# Patient Record
Sex: FEMALE | Race: WHITE | Employment: FULL TIME | URBAN - METROPOLITAN AREA
[De-identification: names, ages, dates, MRNs, and addresses within clinical notes are randomized per-mention and may not be internally consistent; named-entity substitution may affect disease eponyms.]

---

## 2017-02-03 ENCOUNTER — HOSPITAL ENCOUNTER (OUTPATIENT)
Dept: INTERVENTIONAL RADIOLOGY/VASCULAR | Facility: HOSPITAL | Age: 37
Discharge: HOME HEALTH | End: 2017-02-03
Attending: RADIOLOGY | Admitting: RADIOLOGY
Payer: COMMERCIAL

## 2017-02-03 VITALS
RESPIRATION RATE: 18 BRPM | WEIGHT: 293 LBS | SYSTOLIC BLOOD PRESSURE: 121 MMHG | HEART RATE: 76 BPM | DIASTOLIC BLOOD PRESSURE: 68 MMHG | BODY MASS INDEX: 45.99 KG/M2 | HEIGHT: 67 IN | OXYGEN SATURATION: 100 %

## 2017-02-03 DIAGNOSIS — M79.604 PAIN OF RIGHT LOWER EXTREMITY: Primary | ICD-10-CM

## 2017-02-03 DIAGNOSIS — I83.813 VARICOSE VEINS WITH PAIN, BILATERAL: ICD-10-CM

## 2017-02-03 LAB — B-HCG UR QL: NEGATIVE

## 2017-02-03 PROCEDURE — 36478 ENDOVENOUS LASER 1ST VEIN: CPT

## 2017-02-03 PROCEDURE — 065P3ZZ DESTRUCTION OF RIGHT SAPHENOUS VEIN, PERCUTANEOUS APPROACH: ICD-10-PCS | Performed by: RADIOLOGY

## 2017-02-03 PROCEDURE — 37766 PHLEB VEINS - EXTREM 20+: CPT

## 2017-02-03 PROCEDURE — 06DP3ZZ EXTRACTION OF RIGHT SAPHENOUS VEIN, PERCUTANEOUS APPROACH: ICD-10-PCS | Performed by: RADIOLOGY

## 2017-02-03 PROCEDURE — 81025 URINE PREGNANCY TEST: CPT | Performed by: RADIOLOGY

## 2017-02-03 RX ORDER — SODIUM CHLORIDE 9 MG/ML
INJECTION, SOLUTION INTRAVENOUS
Status: COMPLETED
Start: 2017-02-03 | End: 2017-02-03

## 2017-02-03 RX ORDER — SODIUM CHLORIDE 9 MG/ML
INJECTION, SOLUTION INTRAVENOUS
Status: DISCONTINUED
Start: 2017-02-03 | End: 2017-02-03

## 2017-02-03 RX ORDER — MIDAZOLAM HYDROCHLORIDE 1 MG/ML
INJECTION INTRAMUSCULAR; INTRAVENOUS
Status: COMPLETED
Start: 2017-02-03 | End: 2017-02-03

## 2017-02-03 RX ORDER — SODIUM CHLORIDE 9 MG/ML
INJECTION, SOLUTION INTRAVENOUS CONTINUOUS
Status: DISCONTINUED | OUTPATIENT
Start: 2017-02-03 | End: 2017-02-03

## 2017-02-03 RX ORDER — MIDAZOLAM HYDROCHLORIDE 1 MG/ML
2 INJECTION INTRAMUSCULAR; INTRAVENOUS EVERY 5 MIN PRN
Status: COMPLETED | OUTPATIENT
Start: 2017-02-03 | End: 2017-02-03

## 2017-02-03 RX ORDER — LIDOCAINE HYDROCHLORIDE 20 MG/ML
INJECTION, SOLUTION EPIDURAL; INFILTRATION; INTRACAUDAL; PERINEURAL
Status: COMPLETED
Start: 2017-02-03 | End: 2017-02-03

## 2017-02-03 RX ADMIN — MIDAZOLAM HYDROCHLORIDE 0.5 MG: 1 INJECTION INTRAMUSCULAR; INTRAVENOUS at 13:40:00

## 2017-02-03 RX ADMIN — MIDAZOLAM HYDROCHLORIDE 1 MG: 1 INJECTION INTRAMUSCULAR; INTRAVENOUS at 13:00:00

## 2017-02-03 RX ADMIN — MIDAZOLAM HYDROCHLORIDE 1 MG: 1 INJECTION INTRAMUSCULAR; INTRAVENOUS at 13:08:00

## 2017-02-03 RX ADMIN — MIDAZOLAM HYDROCHLORIDE 1 MG: 1 INJECTION INTRAMUSCULAR; INTRAVENOUS at 13:59:00

## 2017-02-03 RX ADMIN — MIDAZOLAM HYDROCHLORIDE 0.5 MG: 1 INJECTION INTRAMUSCULAR; INTRAVENOUS at 13:24:00

## 2017-02-03 RX ADMIN — MIDAZOLAM HYDROCHLORIDE 1 MG: 1 INJECTION INTRAMUSCULAR; INTRAVENOUS at 13:11:00

## 2017-02-03 NOTE — PROCEDURES
[facility] HOSP - [facility]  Procedure Note    Bryant Irving Patient Status:  Outpatient in a Bed    1980 MRN [id number]   Location [facility] Attending Ishmael Gonzales MD   Hosp Day # 0 PCP Bill Carson MD

## 2017-02-03 NOTE — H&P
221 Rubén Reid Patient Status:  Outpatient in a Bed    1980 MRN [id number]   Location [facility] Attending Shaheen Martinez MD   Hosp Day # 0 PCP Bryan Flores,

## 2017-02-07 ENCOUNTER — TELEPHONE (OUTPATIENT)
Dept: INTERNAL MEDICINE CLINIC | Facility: CLINIC | Age: 37
End: 2017-02-07

## 2017-02-07 ENCOUNTER — OFFICE VISIT (OUTPATIENT)
Dept: INTERVENTIONAL RADIOLOGY/VASCULAR | Facility: HOSPITAL | Age: 37
End: 2017-02-07
Attending: RADIOLOGY
Payer: COMMERCIAL

## 2017-02-07 ENCOUNTER — LAB ENCOUNTER (OUTPATIENT)
Dept: LAB | Facility: HOSPITAL | Age: 37
End: 2017-02-07
Attending: RADIOLOGY
Payer: COMMERCIAL

## 2017-02-07 VITALS — RESPIRATION RATE: 18 BRPM | SYSTOLIC BLOOD PRESSURE: 120 MMHG | HEART RATE: 80 BPM | DIASTOLIC BLOOD PRESSURE: 80 MMHG

## 2017-02-07 DIAGNOSIS — I83.90 VARICOSE VEIN OF LEG: Primary | ICD-10-CM

## 2017-02-07 DIAGNOSIS — I82.409 DVT (DEEP VENOUS THROMBOSIS) (HCC): ICD-10-CM

## 2017-02-07 DIAGNOSIS — I82.409 DVT (DEEP VENOUS THROMBOSIS) (HCC): Primary | ICD-10-CM

## 2017-02-07 LAB — AT III ACT/NOR PPP CHRO: 94 % NHP (ref 90–122)

## 2017-02-07 PROCEDURE — 81241 F5 GENE: CPT

## 2017-02-07 PROCEDURE — 85390 FIBRINOLYSINS SCREEN I&R: CPT

## 2017-02-07 PROCEDURE — 85210 CLOT FACTOR II PROTHROM SPEC: CPT

## 2017-02-07 PROCEDURE — 85305 CLOT INHIBIT PROT S TOTAL: CPT

## 2017-02-07 PROCEDURE — 81240 F2 GENE: CPT

## 2017-02-07 PROCEDURE — 85302 CLOT INHIBIT PROT C ANTIGEN: CPT

## 2017-02-07 PROCEDURE — 36415 COLL VENOUS BLD VENIPUNCTURE: CPT

## 2017-02-07 PROCEDURE — 85613 RUSSELL VIPER VENOM DILUTED: CPT

## 2017-02-07 PROCEDURE — 85300 ANTITHROMBIN III ACTIVITY: CPT

## 2017-02-07 PROCEDURE — 85730 THROMBOPLASTIN TIME PARTIAL: CPT

## 2017-02-07 PROCEDURE — 85610 PROTHROMBIN TIME: CPT

## 2017-02-07 PROCEDURE — 85335 FACTOR INHIBITOR TEST: CPT

## 2017-02-07 RX ORDER — HYDROCODONE BITARTRATE AND ACETAMINOPHEN 5; 325 MG/1; MG/1
1 TABLET ORAL EVERY 6 HOURS PRN
Status: DISCONTINUED | OUTPATIENT
Start: 2017-02-07 | End: 2017-02-21

## 2017-02-07 RX ORDER — CEPHALEXIN 250 MG/1
250 CAPSULE ORAL EVERY 6 HOURS SCHEDULED
Status: DISCONTINUED | OUTPATIENT
Start: 2017-02-07 | End: 2017-02-21

## 2017-02-07 RX ORDER — HYDROCODONE BITARTRATE AND ACETAMINOPHEN 5; 325 MG/1; MG/1
1 TABLET ORAL EVERY 6 HOURS PRN
Qty: 20 TABLET | Refills: 0 | Status: ON HOLD | OUTPATIENT
Start: 2017-02-07 | End: 2017-02-21

## 2017-02-07 NOTE — TELEPHONE ENCOUNTER
Patient is seeing Dr Laurie See today for follow up from surgery from last Friday also has appt 2-10-17. Patient was told to call office is needing new referral requesting 3 office visits.

## 2017-02-07 NOTE — PROGRESS NOTES
Pt called complaining of increased pain in her leg stating that the pain was tolerable over the weekend, but on Monday her stitches were painful when donning and removing her compression stockings. Has been taking Aleive RTC,. Denies increased swelling.

## 2017-02-07 NOTE — PROGRESS NOTES
[facility]  Progress Note      Micah Peralta Patient Status:  Outpatient    1980 MRN [id number]   [place] [address] Attending Margaux Stack MD   [facility] Day #  PCP Aleksandar Cuevas MD       Subjective:

## 2017-02-08 LAB
APTT PPP: 26.9 SECONDS (ref 23.2–35.3)
CONFIRM DRVVT: 1.1 S (ref 0–1.1)
F2 C.20210G>A GENO BLD/T: NORMAL
PROTHROMBIN TIME: 14 SECONDS (ref 11.8–14.5)

## 2017-02-09 LAB
PROTEIN C, TOTAL ANTIGEN: 81 %
PROTEIN S, TOTAL ANTIGEN: 115 %

## 2017-02-10 ENCOUNTER — TELEPHONE (OUTPATIENT)
Dept: INTERNAL MEDICINE CLINIC | Facility: CLINIC | Age: 37
End: 2017-02-10

## 2017-02-10 ENCOUNTER — HOSPITAL ENCOUNTER (OUTPATIENT)
Dept: ULTRASOUND IMAGING | Facility: HOSPITAL | Age: 37
Discharge: HOME | End: 2017-02-10
Attending: RADIOLOGY
Payer: COMMERCIAL

## 2017-02-10 ENCOUNTER — HOSPITAL ENCOUNTER (INPATIENT)
Facility: HOSPITAL | Age: 37
LOS: 11 days | Discharge: HOME HEALTH | DRG: 580 | End: 2017-02-22
Attending: EMERGENCY MEDICINE | Admitting: HOSPITALIST
Payer: COMMERCIAL

## 2017-02-10 DIAGNOSIS — R73.9 HYPERGLYCEMIA: ICD-10-CM

## 2017-02-10 DIAGNOSIS — L03.115 CELLULITIS OF RIGHT LEG: Primary | ICD-10-CM

## 2017-02-10 DIAGNOSIS — E87.1 HYPONATREMIA: ICD-10-CM

## 2017-02-10 DIAGNOSIS — I83.811 VARICOSE VEINS WITH PAIN, RIGHT: ICD-10-CM

## 2017-02-10 PROCEDURE — 93971 EXTREMITY STUDY: CPT

## 2017-02-10 NOTE — TELEPHONE ENCOUNTER
Chris Dejesus called from [phone]: Wearing compression stockings after vein ablation Developed cellulitis. Sent home on PO clinda. Area marked. If fever or worse ER but F/U on Monday. Pt. To call office to schedule. PCP notified.

## 2017-02-10 NOTE — PROGRESS NOTES
[facility] HOSP - [facility]  Progress Note    Micah Peralta Patient Status:  Outpatient    1980 MRN [id number]   Location [place] [address] Attending Lesley Trinidad MD   Hosp Day # 0 PCP Aleksandar Cuevas MD       Subjective:   Nathan Cartagena

## 2017-02-11 PROBLEM — E87.1 HYPONATREMIA: Status: ACTIVE | Noted: 2017-02-11

## 2017-02-11 PROBLEM — R73.9 HYPERGLYCEMIA: Status: ACTIVE | Noted: 2017-02-11

## 2017-02-11 PROBLEM — L03.115 CELLULITIS OF RIGHT LEG: Status: ACTIVE | Noted: 2017-02-11

## 2017-02-11 LAB
ALBUMIN SERPL BCP-MCNC: 2.7 G/DL (ref 3.5–4.8)
ALP SERPL-CCNC: 60 U/L (ref 32–100)
ALT SERPL-CCNC: 28 U/L (ref 14–54)
ANION GAP SERPL CALC-SCNC: 10 MMOL/L (ref 0–18)
ANION GAP SERPL CALC-SCNC: 4 MMOL/L (ref 0–18)
AST SERPL-CCNC: 30 U/L (ref 15–41)
B-HCG UR QL: NEGATIVE
BASOPHILS # BLD: 0 K/UL (ref 0–0.2)
BASOPHILS # BLD: 0.1 K/UL (ref 0–0.2)
BASOPHILS NFR BLD: 1 %
BASOPHILS NFR BLD: 1 %
BILIRUB DIRECT SERPL-MCNC: 0.2 MG/DL (ref 0–0.2)
BILIRUB SERPL-MCNC: 0.5 MG/DL (ref 0.3–1.2)
BILIRUB UR QL: NEGATIVE
BUN SERPL-MCNC: 10 MG/DL (ref 8–20)
BUN SERPL-MCNC: 10 MG/DL (ref 8–20)
BUN/CREAT SERPL: 12.3 (ref 10–20)
BUN/CREAT SERPL: 13.9 (ref 10–20)
CALCIUM SERPL-MCNC: 7.8 MG/DL (ref 8.5–10.5)
CALCIUM SERPL-MCNC: 8.6 MG/DL (ref 8.5–10.5)
CHLORIDE SERPL-SCNC: 105 MMOL/L (ref 95–110)
CHLORIDE SERPL-SCNC: 99 MMOL/L (ref 95–110)
CLARITY UR: CLEAR
CO2 SERPL-SCNC: 23 MMOL/L (ref 22–32)
CO2 SERPL-SCNC: 25 MMOL/L (ref 22–32)
COLOR UR: YELLOW
CREAT SERPL-MCNC: 0.72 MG/DL (ref 0.5–1.5)
CREAT SERPL-MCNC: 0.81 MG/DL (ref 0.5–1.5)
EOSINOPHIL # BLD: 0.2 K/UL (ref 0–0.7)
EOSINOPHIL # BLD: 0.2 K/UL (ref 0–0.7)
EOSINOPHIL NFR BLD: 2 %
EOSINOPHIL NFR BLD: 2 %
ERYTHROCYTE [DISTWIDTH] IN BLOOD BY AUTOMATED COUNT: 12.3 % (ref 11–15)
ERYTHROCYTE [DISTWIDTH] IN BLOOD BY AUTOMATED COUNT: 12.3 % (ref 11–15)
GLUCOSE BLDC GLUCOMTR-MCNC: 198 MG/DL (ref 70–99)
GLUCOSE BLDC GLUCOMTR-MCNC: 226 MG/DL (ref 70–99)
GLUCOSE BLDC GLUCOMTR-MCNC: 235 MG/DL (ref 70–99)
GLUCOSE BLDC GLUCOMTR-MCNC: 266 MG/DL (ref 70–99)
GLUCOSE BLDC GLUCOMTR-MCNC: 277 MG/DL (ref 70–99)
GLUCOSE SERPL-MCNC: 261 MG/DL (ref 70–99)
GLUCOSE SERPL-MCNC: 269 MG/DL (ref 70–99)
GLUCOSE UR-MCNC: >=500 MG/DL
HCT VFR BLD AUTO: 32.3 % (ref 35–48)
HCT VFR BLD AUTO: 35.9 % (ref 35–48)
HGB BLD-MCNC: 10.8 G/DL (ref 12–16)
HGB BLD-MCNC: 12.3 G/DL (ref 12–16)
KETONES UR-MCNC: NEGATIVE MG/DL
LACTATE SERPL-SCNC: 1 MMOL/L (ref 0.5–2.2)
LACTATE SERPL-SCNC: 1 MMOL/L (ref 0.5–2.2)
LACTATE SERPL-SCNC: 1.5 MMOL/L (ref 0.5–2.2)
LEUKOCYTE ESTERASE UR QL STRIP.AUTO: NEGATIVE
LYMPHOCYTES # BLD: 1.9 K/UL (ref 1–4)
LYMPHOCYTES # BLD: 2.2 K/UL (ref 1–4)
LYMPHOCYTES NFR BLD: 21 %
LYMPHOCYTES NFR BLD: 24 %
MCH RBC QN AUTO: 29.9 PG (ref 27–32)
MCH RBC QN AUTO: 30.1 PG (ref 27–32)
MCHC RBC AUTO-ENTMCNC: 33.6 G/DL (ref 32–37)
MCHC RBC AUTO-ENTMCNC: 34.2 G/DL (ref 32–37)
MCV RBC AUTO: 87.8 FL (ref 80–100)
MCV RBC AUTO: 89.2 FL (ref 80–100)
MONOCYTES # BLD: 0.8 K/UL (ref 0–1)
MONOCYTES # BLD: 0.9 K/UL (ref 0–1)
MONOCYTES NFR BLD: 10 %
MONOCYTES NFR BLD: 9 %
NEUTROPHILS # BLD AUTO: 5.1 K/UL (ref 1.8–7.7)
NEUTROPHILS # BLD AUTO: 6.8 K/UL (ref 1.8–7.7)
NEUTROPHILS NFR BLD: 64 %
NEUTROPHILS NFR BLD: 67 %
NITRITE UR QL STRIP.AUTO: NEGATIVE
OSMOLALITY UR CALC.SUM OF ELEC: 283 MOSM/KG (ref 275–295)
OSMOLALITY UR CALC.SUM OF ELEC: 286 MOSM/KG (ref 275–295)
PH UR: 6 [PH] (ref 5–8)
PLATELET # BLD AUTO: 244 K/UL (ref 140–400)
PLATELET # BLD AUTO: 278 K/UL (ref 140–400)
PMV BLD AUTO: 7.9 FL (ref 7.4–10.3)
PMV BLD AUTO: 8.3 FL (ref 7.4–10.3)
POTASSIUM SERPL-SCNC: 3.6 MMOL/L (ref 3.3–5.1)
POTASSIUM SERPL-SCNC: 3.7 MMOL/L (ref 3.3–5.1)
POTASSIUM SERPL-SCNC: 4.2 MMOL/L (ref 3.3–5.1)
PROT SERPL-MCNC: 6.9 G/DL (ref 5.9–8.4)
PROT UR-MCNC: NEGATIVE MG/DL
RBC # BLD AUTO: 3.62 M/UL (ref 3.7–5.4)
RBC # BLD AUTO: 4.09 M/UL (ref 3.7–5.4)
RBC #/AREA URNS AUTO: 2 /HPF
SODIUM SERPL-SCNC: 132 MMOL/L (ref 136–144)
SODIUM SERPL-SCNC: 134 MMOL/L (ref 136–144)
SP GR UR STRIP: 1.01 (ref 1–1.03)
UROBILINOGEN UR STRIP-ACNC: <2
VIT C UR-MCNC: NEGATIVE MG/DL
WBC # BLD AUTO: 10.2 K/UL (ref 4–11)
WBC # BLD AUTO: 8.1 K/UL (ref 4–11)
WBC #/AREA URNS AUTO: 1 /HPF

## 2017-02-11 PROCEDURE — 99222 1ST HOSP IP/OBS MODERATE 55: CPT | Performed by: HOSPITALIST

## 2017-02-11 RX ORDER — HYDROCODONE BITARTRATE AND ACETAMINOPHEN 10; 325 MG/1; MG/1
1 TABLET ORAL EVERY 4 HOURS PRN
Status: DISCONTINUED | OUTPATIENT
Start: 2017-02-11 | End: 2017-02-15

## 2017-02-11 RX ORDER — MORPHINE SULFATE 4 MG/ML
4 INJECTION, SOLUTION INTRAMUSCULAR; INTRAVENOUS ONCE
Status: COMPLETED | OUTPATIENT
Start: 2017-02-11 | End: 2017-02-11

## 2017-02-11 RX ORDER — SENNA AND DOCUSATE SODIUM 50; 8.6 MG/1; MG/1
2 TABLET, FILM COATED ORAL DAILY
Status: DISCONTINUED | OUTPATIENT
Start: 2017-02-11 | End: 2017-02-22

## 2017-02-11 RX ORDER — KETOROLAC TROMETHAMINE 30 MG/ML
15 INJECTION, SOLUTION INTRAMUSCULAR; INTRAVENOUS ONCE
Status: COMPLETED | OUTPATIENT
Start: 2017-02-11 | End: 2017-02-11

## 2017-02-11 RX ORDER — DEXTROSE MONOHYDRATE 25 G/50ML
50 INJECTION, SOLUTION INTRAVENOUS AS NEEDED
Status: DISCONTINUED | OUTPATIENT
Start: 2017-02-11 | End: 2017-02-17

## 2017-02-11 RX ORDER — ATORVASTATIN CALCIUM 10 MG/1
10 TABLET, FILM COATED ORAL NIGHTLY
Status: DISCONTINUED | OUTPATIENT
Start: 2017-02-11 | End: 2017-02-22

## 2017-02-11 RX ORDER — POLYETHYLENE GLYCOL 3350 17 G/17G
17 POWDER, FOR SOLUTION ORAL DAILY PRN
Status: DISCONTINUED | OUTPATIENT
Start: 2017-02-11 | End: 2017-02-22

## 2017-02-11 RX ORDER — FLUOXETINE HYDROCHLORIDE 20 MG/1
40 CAPSULE ORAL DAILY
Status: DISCONTINUED | OUTPATIENT
Start: 2017-02-11 | End: 2017-02-22

## 2017-02-11 RX ORDER — HYDROMORPHONE HYDROCHLORIDE 1 MG/ML
1 INJECTION, SOLUTION INTRAMUSCULAR; INTRAVENOUS; SUBCUTANEOUS EVERY 2 HOUR PRN
Status: DISCONTINUED | OUTPATIENT
Start: 2017-02-11 | End: 2017-02-22

## 2017-02-11 RX ORDER — HEPARIN SODIUM 5000 [USP'U]/ML
5000 INJECTION, SOLUTION INTRAVENOUS; SUBCUTANEOUS EVERY 12 HOURS
Status: DISCONTINUED | OUTPATIENT
Start: 2017-02-11 | End: 2017-02-14

## 2017-02-11 RX ORDER — POTASSIUM CHLORIDE 20 MEQ/1
40 TABLET, EXTENDED RELEASE ORAL EVERY 4 HOURS
Status: COMPLETED | OUTPATIENT
Start: 2017-02-11 | End: 2017-02-11

## 2017-02-11 RX ORDER — ONDANSETRON 2 MG/ML
4 INJECTION INTRAMUSCULAR; INTRAVENOUS EVERY 6 HOURS PRN
Status: DISCONTINUED | OUTPATIENT
Start: 2017-02-11 | End: 2017-02-14

## 2017-02-11 RX ORDER — HYDROCODONE BITARTRATE AND ACETAMINOPHEN 5; 325 MG/1; MG/1
1 TABLET ORAL EVERY 6 HOURS PRN
Status: DISCONTINUED | OUTPATIENT
Start: 2017-02-11 | End: 2017-02-12

## 2017-02-11 RX ORDER — SODIUM CHLORIDE 9 MG/ML
INJECTION, SOLUTION INTRAVENOUS CONTINUOUS
Status: DISCONTINUED | OUTPATIENT
Start: 2017-02-11 | End: 2017-02-17

## 2017-02-11 RX ORDER — HYDROCODONE BITARTRATE AND ACETAMINOPHEN 5; 325 MG/1; MG/1
1 TABLET ORAL EVERY 6 HOURS PRN
Status: DISCONTINUED | OUTPATIENT
Start: 2017-02-11 | End: 2017-02-11

## 2017-02-11 NOTE — ED NOTES
Pt verbalized having vein striping surgery on February 3d, 2017. Pt had a follow up mary. On tuesday and was placed on cephalexin and norco bc she developed pain and fever on Monday.  Yesterday pt went for a main follow up, and was placed on clindamycin bc r

## 2017-02-11 NOTE — H&P
[facility]    PATIENT'S NAME: Chanel Bhatia PHYSICIAN: Daria Dang MD   PATIENT ACCOUNT#:   [id number]    LOCATION:  [address] [place] [address] #:   [id number]       YOB: 1980  ADMISSION DATE:       02/10/ admitted for further evaluation and treatment. .      Labs:  Glucose 269, sodium 132, potassium 3.7, chloride 99, CO2 of 23, BUN 10 with a creatinine of 0.81. Anion gap 10. Alkaline phosphatase 60.   The remainder of LFTs were essentially normal, except f saturation 98% on room air. HEENT:  Normocephalic, atraumatic. Pupils equal, reactive to light. Sclerae anicteric. There was no sinus tenderness. Posterior pharynx was not injected. Oropharynx was crowded. NECK:  Supple.   LUNGS:  Clear with decrease a fluctuant area on exam.  We will await their recommendations and continue IV antibiotics as above. 2.   Diabetes mellitus type 2. The patient has been noncompliant with medications and testing.   Discussed at length with her and family at the bedside th

## 2017-02-11 NOTE — PROGRESS NOTES
[facility] dosing service    Initial Pharmacokinetic Consult for Vancomycin Dosing     Jason Liner is a 39year old female who is being treated for Jaanioja 7 has been asked to dose Vancomycin by Dr. Emmett Roe    She is allergic to sulfa an

## 2017-02-11 NOTE — ED PROVIDER NOTES
Patient Seen in: [facility] AND [facility] Emergency Department    History   Patient presents with:  Fever    Stated Complaint: fever    HPI    43-year-old female presents for complaint of right lower extremity redness, pain, swelling.   Patient's 1 week postop f Genitourinary: Negative. Musculoskeletal: Negative. Skin: Positive for wound. Neurological: Negative. All other systems reviewed and are negative. Positive for stated complaint: fever  Other systems are as noted in HPI.   Constitutional and All other components within normal limits   URINALYSIS WITH CULTURE REFLEX - Abnormal; Notable for the following:     Glucose Urine >=500 (*)     Blood Urine Moderate (*)     Bacteria Urine Few (*)     All other components within normal limits   [facility] POCT

## 2017-02-11 NOTE — PROGRESS NOTES
Cont current abx  Elevate lower ext as tolerated  Add norco 10 and dilaudid prn    See Dr. Maira Dolan note for further details

## 2017-02-11 NOTE — ED INITIAL ASSESSMENT (HPI)
Fever intermittent x4 day, had rt leg veinous surg 1 wk ago. Redness and swelling noted today.  + cms

## 2017-02-12 ENCOUNTER — APPOINTMENT (OUTPATIENT)
Dept: ULTRASOUND IMAGING | Facility: HOSPITAL | Age: 37
DRG: 580 | End: 2017-02-12
Attending: HOSPITALIST
Payer: COMMERCIAL

## 2017-02-12 LAB
ANION GAP SERPL CALC-SCNC: 7 MMOL/L (ref 0–18)
BASOPHILS # BLD: 0 K/UL (ref 0–0.2)
BASOPHILS NFR BLD: 1 %
BUN SERPL-MCNC: 6 MG/DL (ref 8–20)
BUN/CREAT SERPL: 8.6 (ref 10–20)
CALCIUM SERPL-MCNC: 8.1 MG/DL (ref 8.5–10.5)
CHLORIDE SERPL-SCNC: 100 MMOL/L (ref 95–110)
CO2 SERPL-SCNC: 26 MMOL/L (ref 22–32)
CREAT SERPL-MCNC: 0.7 MG/DL (ref 0.5–1.5)
EOSINOPHIL # BLD: 0.2 K/UL (ref 0–0.7)
EOSINOPHIL NFR BLD: 2 %
ERYTHROCYTE [DISTWIDTH] IN BLOOD BY AUTOMATED COUNT: 12 % (ref 11–15)
GLUCOSE BLDC GLUCOMTR-MCNC: 221 MG/DL (ref 70–99)
GLUCOSE BLDC GLUCOMTR-MCNC: 247 MG/DL (ref 70–99)
GLUCOSE BLDC GLUCOMTR-MCNC: 254 MG/DL (ref 70–99)
GLUCOSE BLDC GLUCOMTR-MCNC: 266 MG/DL (ref 70–99)
GLUCOSE SERPL-MCNC: 238 MG/DL (ref 70–99)
HCT VFR BLD AUTO: 33.5 % (ref 35–48)
HGB BLD-MCNC: 11.2 G/DL (ref 12–16)
LYMPHOCYTES # BLD: 1.6 K/UL (ref 1–4)
LYMPHOCYTES NFR BLD: 20 %
MCH RBC QN AUTO: 30.1 PG (ref 27–32)
MCHC RBC AUTO-ENTMCNC: 33.6 G/DL (ref 32–37)
MCV RBC AUTO: 89.5 FL (ref 80–100)
MONOCYTES # BLD: 0.7 K/UL (ref 0–1)
MONOCYTES NFR BLD: 9 %
NEUTROPHILS # BLD AUTO: 5.5 K/UL (ref 1.8–7.7)
NEUTROPHILS NFR BLD: 68 %
OSMOLALITY UR CALC.SUM OF ELEC: 281 MOSM/KG (ref 275–295)
PLATELET # BLD AUTO: 248 K/UL (ref 140–400)
PMV BLD AUTO: 8.1 FL (ref 7.4–10.3)
POTASSIUM SERPL-SCNC: 4.1 MMOL/L (ref 3.3–5.1)
RBC # BLD AUTO: 3.74 M/UL (ref 3.7–5.4)
SODIUM SERPL-SCNC: 133 MMOL/L (ref 136–144)
VANCOMYCIN TROUGH SERPL-MCNC: 3.9 MCG/ML (ref 10–20)
WBC # BLD AUTO: 8 K/UL (ref 4–11)

## 2017-02-12 PROCEDURE — 76882 US LMTD JT/FCL EVL NVASC XTR: CPT

## 2017-02-12 PROCEDURE — 99233 SBSQ HOSP IP/OBS HIGH 50: CPT | Performed by: HOSPITALIST

## 2017-02-12 RX ORDER — HYDROCODONE BITARTRATE AND ACETAMINOPHEN 5; 325 MG/1; MG/1
1 TABLET ORAL
Status: DISCONTINUED | OUTPATIENT
Start: 2017-02-12 | End: 2017-02-15

## 2017-02-12 NOTE — PROGRESS NOTES
120 [facility] dosing service    Follow-up Pharmacokinetic Consult for Vancomycin Dosing     Jaymie Aldana is a 39year old female who is being treated for cellulitis. Patient is on day 2 of Vancomycin 1.5 gm IV Q 12 hours. Goal trough is 10-15 ug/mL. BUN/Scr daily while on Vancomycin to assess renal function. 4.  Pharmacy will follow and monitor renal function changes, toxicity and efficacy. Pharmacy will continue to follow her. We appreciate the opportunity to assist in her care.     Arian Donaldson

## 2017-02-12 NOTE — PROGRESS NOTES
[facility]    Progress Note    Rob Minaya Patient Status:  Inpatient    1980 MRN [id number]   Location [facility] 4W/SW/SE Attending John Mitchell MD   Hosp Day # 2 PCP Nat Deshpande MD       Subjective:   Libra Michelle piperacillin-tazobactam  4.5 g Intravenous Q8H   • Senna-Docusate Sodium  2 tablet Oral Daily      Assessment and Plan:     Cellulitis of right leg  - Leg is still swollen and red  - looks more raised for probable fluid/abscess underneath  - continue norco

## 2017-02-12 NOTE — PLAN OF CARE
Diabetes/Glucose Control    • Glucose maintained within prescribed range Progressing        Patient/Family Goals    • Patient/Family Long Term Goal Progressing    • Patient/Family Short Term Goal Progressing        RLE reddened and swollen, elevated while

## 2017-02-12 NOTE — PLAN OF CARE
Diabetes/Glucose Control    • Glucose maintained within prescribed range Progressing        PAIN - ADULT    • Verbalizes/displays adequate comfort level or patient's stated pain goal Progressing        Patient/Family Goals    • Patient/Family [facility]

## 2017-02-13 ENCOUNTER — ANESTHESIA EVENT (OUTPATIENT)
Dept: INTERVENTIONAL RADIOLOGY/VASCULAR | Facility: HOSPITAL | Age: 37
DRG: 580 | End: 2017-02-13
Payer: COMMERCIAL

## 2017-02-13 ENCOUNTER — APPOINTMENT (OUTPATIENT)
Dept: INTERVENTIONAL RADIOLOGY/VASCULAR | Facility: HOSPITAL | Age: 37
DRG: 580 | End: 2017-02-13
Attending: RADIOLOGY
Payer: COMMERCIAL

## 2017-02-13 LAB
ANION GAP SERPL CALC-SCNC: 7 MMOL/L (ref 0–18)
BASOPHILS # BLD: 0.1 K/UL (ref 0–0.2)
BASOPHILS NFR BLD: 1 %
BUN SERPL-MCNC: 5 MG/DL (ref 8–20)
BUN/CREAT SERPL: 6.8 (ref 10–20)
CALCIUM SERPL-MCNC: 8.3 MG/DL (ref 8.5–10.5)
CHLORIDE SERPL-SCNC: 99 MMOL/L (ref 95–110)
CO2 SERPL-SCNC: 26 MMOL/L (ref 22–32)
CREAT SERPL-MCNC: 0.73 MG/DL (ref 0.5–1.5)
EOSINOPHIL # BLD: 0.2 K/UL (ref 0–0.7)
EOSINOPHIL NFR BLD: 2 %
ERYTHROCYTE [DISTWIDTH] IN BLOOD BY AUTOMATED COUNT: 11.9 % (ref 11–15)
GLUCOSE BLDC GLUCOMTR-MCNC: 226 MG/DL (ref 70–99)
GLUCOSE BLDC GLUCOMTR-MCNC: 234 MG/DL (ref 70–99)
GLUCOSE BLDC GLUCOMTR-MCNC: 282 MG/DL (ref 70–99)
GLUCOSE BLDC GLUCOMTR-MCNC: 292 MG/DL (ref 70–99)
GLUCOSE SERPL-MCNC: 240 MG/DL (ref 70–99)
HCT VFR BLD AUTO: 31.1 % (ref 35–48)
HGB BLD-MCNC: 10.6 G/DL (ref 12–16)
LYMPHOCYTES # BLD: 1.9 K/UL (ref 1–4)
LYMPHOCYTES NFR BLD: 24 %
MCH RBC QN AUTO: 30.2 PG (ref 27–32)
MCHC RBC AUTO-ENTMCNC: 34.1 G/DL (ref 32–37)
MCV RBC AUTO: 88.5 FL (ref 80–100)
MONOCYTES # BLD: 0.8 K/UL (ref 0–1)
MONOCYTES NFR BLD: 10 %
NEUTROPHILS # BLD AUTO: 5.1 K/UL (ref 1.8–7.7)
NEUTROPHILS NFR BLD: 63 %
OSMOLALITY UR CALC.SUM OF ELEC: 279 MOSM/KG (ref 275–295)
PLATELET # BLD AUTO: 265 K/UL (ref 140–400)
PMV BLD AUTO: 7.8 FL (ref 7.4–10.3)
POTASSIUM SERPL-SCNC: 4.3 MMOL/L (ref 3.3–5.1)
RBC # BLD AUTO: 3.52 M/UL (ref 3.7–5.4)
SODIUM SERPL-SCNC: 132 MMOL/L (ref 136–144)
VANCOMYCIN TROUGH SERPL-MCNC: 9.4 MCG/ML (ref 10–20)
WBC # BLD AUTO: 8.1 K/UL (ref 4–11)

## 2017-02-13 PROCEDURE — 0J9N30Z DRAINAGE OF RIGHT LOWER LEG SUBCUTANEOUS TISSUE AND FASCIA WITH DRAINAGE DEVICE, PERCUTANEOUS APPROACH: ICD-10-PCS | Performed by: RADIOLOGY

## 2017-02-13 PROCEDURE — 99233 SBSQ HOSP IP/OBS HIGH 50: CPT | Performed by: HOSPITALIST

## 2017-02-13 RX ORDER — LIDOCAINE HYDROCHLORIDE 20 MG/ML
INJECTION, SOLUTION EPIDURAL; INFILTRATION; INTRACAUDAL; PERINEURAL
Status: COMPLETED
Start: 2017-02-13 | End: 2017-02-13

## 2017-02-13 RX ORDER — SODIUM CHLORIDE 9 MG/ML
INJECTION, SOLUTION INTRAVENOUS
Status: COMPLETED
Start: 2017-02-13 | End: 2017-02-13

## 2017-02-13 RX ORDER — DEXTROSE MONOHYDRATE 25 G/50ML
50 INJECTION, SOLUTION INTRAVENOUS AS NEEDED
Status: DISCONTINUED | OUTPATIENT
Start: 2017-02-13 | End: 2017-02-17

## 2017-02-13 RX ORDER — HYDROCODONE BITARTRATE AND ACETAMINOPHEN 5; 325 MG/1; MG/1
2 TABLET ORAL AS NEEDED
Status: DISCONTINUED | OUTPATIENT
Start: 2017-02-13 | End: 2017-02-14 | Stop reason: HOSPADM

## 2017-02-13 RX ORDER — SODIUM CHLORIDE, SODIUM LACTATE, POTASSIUM CHLORIDE, CALCIUM CHLORIDE 600; 310; 30; 20 MG/100ML; MG/100ML; MG/100ML; MG/100ML
INJECTION, SOLUTION INTRAVENOUS CONTINUOUS
Status: DISCONTINUED | OUTPATIENT
Start: 2017-02-13 | End: 2017-02-18

## 2017-02-13 RX ORDER — ONDANSETRON 2 MG/ML
4 INJECTION INTRAMUSCULAR; INTRAVENOUS ONCE AS NEEDED
Status: ACTIVE | OUTPATIENT
Start: 2017-02-13 | End: 2017-02-13

## 2017-02-13 RX ORDER — HYDROCODONE BITARTRATE AND ACETAMINOPHEN 5; 325 MG/1; MG/1
1 TABLET ORAL AS NEEDED
Status: DISCONTINUED | OUTPATIENT
Start: 2017-02-13 | End: 2017-02-14 | Stop reason: HOSPADM

## 2017-02-13 NOTE — PLAN OF CARE
PAIN - ADULT    • Verbalizes/displays adequate comfort level or patient's stated pain goal Progressing          Patient/Family Goals    • Patient/Family Long Term Goal Progressing    • Patient/Family Short Term Goal Progressing        Leisa admitted with

## 2017-02-13 NOTE — PROGRESS NOTES
[facility]  Progress Note    Moises Do Patient Status:  Inpatient    1980 MRN [id number]   Location [facility] 4W/SW/SE Attending Emerald Flores MD   Hosp Day # 3 PCP Terri Chambers MD       Subjective:   C/O pain

## 2017-02-13 NOTE — PROCEDURES
[facility] HOSP - [facility]  Procedure Note    Micah Peralta Patient Status:  Inpatient    1980 MRN [id number]   Location [facility] Attending Rosalind Brumfield MD   Hosp Day # 3 PCP Aleksandar Cuevas MD     Procedure:

## 2017-02-13 NOTE — ANESTHESIA PREPROCEDURE EVALUATION
Anesthesia PreOp Note    HPI:     Reg Muro is a 39year old female who presents for preoperative consultation requested by: * No surgeons listed *    Date of Surgery: 2/13/2017    * No procedures listed *  Indication: * No pre-op diagnosis entered * Facility-Administered Medications Ordered in Epic:  Vancomycin HCl (VANCOCIN) 1.75 g in sodium chloride 0.9 % 500 mL IVPB 1.75 g Intravenous Q8H Rise MD Jon     HYDROcodone-acetaminophen (NORCO) 5-325 MG per tab 1 tablet 1 tablet Oral 6x Daily Mother        Social History   Marital Status: Single  Spouse Name: N/A    Years of Education: N/A  Number of Children: N/A     Occupational History  None on file     Social History Main Topics   Smoking status: Never Smoker     Smokeless tobacco: Not on f mellitus,   Abdominal              Anesthesia Plan:   ASA:  3  Plan:   MAC  Plan Comments: Ate breakfast at 08:30, about 6 hours ago. Discussed MAC, trying to avoid very deep levels of sedation.   Informed Consent Plan and Risks Discussed With:  Mother, fat

## 2017-02-13 NOTE — PROGRESS NOTES
[facility]    Progress Note    Pushpa Naranjo Patient Status:  Inpatient    1980 MRN [id number]   Location [facility] 4W/SW/SE Attending Karl Garcia MD   Hosp Day # 3 PCP Marita Sahni MD       Subjective:   Edward Abdi midcalf in the area of reported concern. This measures up to 4.4 cm. No internal vascularity. This could represent a hematoma or abscess. Ir Fluid Drainage    2/13/2017  CONCLUSION:  1.  Technically successful ultrasound-guided placement of subcutaneou

## 2017-02-13 NOTE — ANESTHESIA POSTPROCEDURE EVALUATION
Patient: Worcester Salines    Procedure Summary     Date Anesthesia Start Anesthesia Stop Room / Location    02/13/17 2404 [facility] Interventional Suites       Procedure Diagnosis Scheduled Providers Responsible Provider    IR FLUID DRAINAGE (RIGHT

## 2017-02-13 NOTE — PROGRESS NOTES
>> Melani Avendaño 2/13/2017 09:33  [facility] dosing service    Follow-up Pharmacokinetic Consult for Vancomycin Dosing     Ashely Regan is a 39year old female who is being treated for cellulitis.   Patient is on day 3 of Vancomycin 1.5 gm IV Q 8 BUN/Scr daily while on Vancomycin to assess renal function. 4.  Pharmacy will follow and monitor renal function changes, toxicity and efficacy. Pharmacy will continue to follow her. We appreciate the opportunity to assist in her care.     Leandro Montejo

## 2017-02-13 NOTE — CM/SW NOTE
CTL/Rounds: Spoke with patient regarding discharge planning. Patient states that she is independent with ADLs, working and driving. Patient lives alone.  If needing IV abx, the patient's insurance dictates that she return to [facility] [address] for infus

## 2017-02-14 ENCOUNTER — SURGERY (OUTPATIENT)
Age: 37
End: 2017-02-14

## 2017-02-14 ENCOUNTER — ANESTHESIA EVENT (OUTPATIENT)
Dept: SURGERY | Facility: HOSPITAL | Age: 37
DRG: 580 | End: 2017-02-14
Payer: COMMERCIAL

## 2017-02-14 ENCOUNTER — ANESTHESIA (OUTPATIENT)
Dept: SURGERY | Facility: HOSPITAL | Age: 37
DRG: 580 | End: 2017-02-14
Payer: COMMERCIAL

## 2017-02-14 LAB
ANION GAP SERPL CALC-SCNC: 7 MMOL/L (ref 0–18)
BASOPHILS # BLD: 0.1 K/UL (ref 0–0.2)
BASOPHILS NFR BLD: 1 %
BUN SERPL-MCNC: 6 MG/DL (ref 8–20)
BUN/CREAT SERPL: 7.4 (ref 10–20)
CALCIUM SERPL-MCNC: 8.7 MG/DL (ref 8.5–10.5)
CHLORIDE SERPL-SCNC: 101 MMOL/L (ref 95–110)
CO2 SERPL-SCNC: 27 MMOL/L (ref 22–32)
CREAT SERPL-MCNC: 0.81 MG/DL (ref 0.5–1.5)
EOSINOPHIL # BLD: 0.3 K/UL (ref 0–0.7)
EOSINOPHIL NFR BLD: 4 %
ERYTHROCYTE [DISTWIDTH] IN BLOOD BY AUTOMATED COUNT: 12 % (ref 11–15)
GLUCOSE BLDC GLUCOMTR-MCNC: 226 MG/DL (ref 70–99)
GLUCOSE BLDC GLUCOMTR-MCNC: 274 MG/DL (ref 70–99)
GLUCOSE BLDC GLUCOMTR-MCNC: 275 MG/DL (ref 70–99)
GLUCOSE BLDC GLUCOMTR-MCNC: 297 MG/DL (ref 70–99)
GLUCOSE SERPL-MCNC: 224 MG/DL (ref 70–99)
HBA1C MFR BLD: 10.1 % (ref 4–6)
HCT VFR BLD AUTO: 32.8 % (ref 35–48)
HGB BLD-MCNC: 11.1 G/DL (ref 12–16)
LYMPHOCYTES # BLD: 1.6 K/UL (ref 1–4)
LYMPHOCYTES NFR BLD: 21 %
MCH RBC QN AUTO: 30 PG (ref 27–32)
MCHC RBC AUTO-ENTMCNC: 33.7 G/DL (ref 32–37)
MCV RBC AUTO: 89.2 FL (ref 80–100)
MONOCYTES # BLD: 0.8 K/UL (ref 0–1)
MONOCYTES NFR BLD: 11 %
NEUTROPHILS # BLD AUTO: 4.7 K/UL (ref 1.8–7.7)
NEUTROPHILS NFR BLD: 63 %
OSMOLALITY UR CALC.SUM OF ELEC: 285 MOSM/KG (ref 275–295)
PLATELET # BLD AUTO: 278 K/UL (ref 140–400)
PMV BLD AUTO: 7.8 FL (ref 7.4–10.3)
POTASSIUM SERPL-SCNC: 4.5 MMOL/L (ref 3.3–5.1)
RBC # BLD AUTO: 3.68 M/UL (ref 3.7–5.4)
SODIUM SERPL-SCNC: 135 MMOL/L (ref 136–144)
VANCOMYCIN TROUGH SERPL-MCNC: 23.9 MCG/ML (ref 10–20)
WBC # BLD AUTO: 7.4 K/UL (ref 4–11)

## 2017-02-14 PROCEDURE — 99233 SBSQ HOSP IP/OBS HIGH 50: CPT | Performed by: HOSPITALIST

## 2017-02-14 PROCEDURE — 0JDN0ZZ EXTRACTION OF RIGHT LOWER LEG SUBCUTANEOUS TISSUE AND FASCIA, OPEN APPROACH: ICD-10-PCS | Performed by: SURGERY

## 2017-02-14 PROCEDURE — 64450 NJX AA&/STRD OTHER PN/BRANCH: CPT | Performed by: ANESTHESIOLOGY

## 2017-02-14 RX ORDER — HYDROMORPHONE HYDROCHLORIDE 1 MG/ML
0.4 INJECTION, SOLUTION INTRAMUSCULAR; INTRAVENOUS; SUBCUTANEOUS EVERY 5 MIN PRN
Status: DISCONTINUED | OUTPATIENT
Start: 2017-02-14 | End: 2017-02-14 | Stop reason: HOSPADM

## 2017-02-14 RX ORDER — SODIUM CHLORIDE, SODIUM LACTATE, POTASSIUM CHLORIDE, CALCIUM CHLORIDE 600; 310; 30; 20 MG/100ML; MG/100ML; MG/100ML; MG/100ML
INJECTION, SOLUTION INTRAVENOUS CONTINUOUS
Status: DISCONTINUED | OUTPATIENT
Start: 2017-02-14 | End: 2017-02-18

## 2017-02-14 RX ORDER — MORPHINE SULFATE 4 MG/ML
6 INJECTION, SOLUTION INTRAMUSCULAR; INTRAVENOUS EVERY 2 HOUR PRN
Status: DISCONTINUED | OUTPATIENT
Start: 2017-02-14 | End: 2017-02-15

## 2017-02-14 RX ORDER — HALOPERIDOL 5 MG/ML
0.25 INJECTION INTRAMUSCULAR ONCE AS NEEDED
Status: DISCONTINUED | OUTPATIENT
Start: 2017-02-14 | End: 2017-02-14 | Stop reason: HOSPADM

## 2017-02-14 RX ORDER — MORPHINE SULFATE 10 MG/ML
6 INJECTION, SOLUTION INTRAMUSCULAR; INTRAVENOUS EVERY 10 MIN PRN
Status: DISCONTINUED | OUTPATIENT
Start: 2017-02-14 | End: 2017-02-14 | Stop reason: HOSPADM

## 2017-02-14 RX ORDER — HYDROCODONE BITARTRATE AND ACETAMINOPHEN 5; 325 MG/1; MG/1
2 TABLET ORAL AS NEEDED
Status: DISCONTINUED | OUTPATIENT
Start: 2017-02-14 | End: 2017-02-14 | Stop reason: HOSPADM

## 2017-02-14 RX ORDER — MORPHINE SULFATE 4 MG/ML
4 INJECTION, SOLUTION INTRAMUSCULAR; INTRAVENOUS EVERY 10 MIN PRN
Status: DISCONTINUED | OUTPATIENT
Start: 2017-02-14 | End: 2017-02-14 | Stop reason: HOSPADM

## 2017-02-14 RX ORDER — OXYCODONE HYDROCHLORIDE 5 MG/1
5 TABLET ORAL EVERY 4 HOURS PRN
Status: DISCONTINUED | OUTPATIENT
Start: 2017-02-14 | End: 2017-02-15

## 2017-02-14 RX ORDER — ONDANSETRON 2 MG/ML
8 INJECTION INTRAMUSCULAR; INTRAVENOUS EVERY 6 HOURS PRN
Status: DISCONTINUED | OUTPATIENT
Start: 2017-02-14 | End: 2017-02-22

## 2017-02-14 RX ORDER — SUCCINYLCHOLINE CHLORIDE 20 MG/ML
INJECTION INTRAMUSCULAR; INTRAVENOUS AS NEEDED
Status: DISCONTINUED | OUTPATIENT
Start: 2017-02-14 | End: 2017-02-14 | Stop reason: SURG

## 2017-02-14 RX ORDER — ONDANSETRON 2 MG/ML
INJECTION INTRAMUSCULAR; INTRAVENOUS AS NEEDED
Status: DISCONTINUED | OUTPATIENT
Start: 2017-02-14 | End: 2017-02-14 | Stop reason: SURG

## 2017-02-14 RX ORDER — OXYCODONE HYDROCHLORIDE 5 MG/1
10 TABLET ORAL EVERY 4 HOURS PRN
Status: DISCONTINUED | OUTPATIENT
Start: 2017-02-14 | End: 2017-02-15

## 2017-02-14 RX ORDER — NALOXONE HYDROCHLORIDE 0.4 MG/ML
80 INJECTION, SOLUTION INTRAMUSCULAR; INTRAVENOUS; SUBCUTANEOUS AS NEEDED
Status: DISCONTINUED | OUTPATIENT
Start: 2017-02-14 | End: 2017-02-14 | Stop reason: HOSPADM

## 2017-02-14 RX ORDER — HYDROCODONE BITARTRATE AND ACETAMINOPHEN 5; 325 MG/1; MG/1
1 TABLET ORAL AS NEEDED
Status: DISCONTINUED | OUTPATIENT
Start: 2017-02-14 | End: 2017-02-14 | Stop reason: HOSPADM

## 2017-02-14 RX ORDER — ONDANSETRON 2 MG/ML
4 INJECTION INTRAMUSCULAR; INTRAVENOUS ONCE AS NEEDED
Status: DISCONTINUED | OUTPATIENT
Start: 2017-02-14 | End: 2017-02-14 | Stop reason: HOSPADM

## 2017-02-14 RX ORDER — HYDROMORPHONE HYDROCHLORIDE 1 MG/ML
0.6 INJECTION, SOLUTION INTRAMUSCULAR; INTRAVENOUS; SUBCUTANEOUS EVERY 5 MIN PRN
Status: DISCONTINUED | OUTPATIENT
Start: 2017-02-14 | End: 2017-02-14 | Stop reason: HOSPADM

## 2017-02-14 RX ORDER — HYDROMORPHONE HYDROCHLORIDE 1 MG/ML
0.2 INJECTION, SOLUTION INTRAMUSCULAR; INTRAVENOUS; SUBCUTANEOUS EVERY 5 MIN PRN
Status: DISCONTINUED | OUTPATIENT
Start: 2017-02-14 | End: 2017-02-14 | Stop reason: HOSPADM

## 2017-02-14 RX ORDER — MORPHINE SULFATE 4 MG/ML
4 INJECTION, SOLUTION INTRAMUSCULAR; INTRAVENOUS EVERY 2 HOUR PRN
Status: DISCONTINUED | OUTPATIENT
Start: 2017-02-14 | End: 2017-02-15

## 2017-02-14 RX ORDER — MORPHINE SULFATE 2 MG/ML
2 INJECTION, SOLUTION INTRAMUSCULAR; INTRAVENOUS EVERY 10 MIN PRN
Status: DISCONTINUED | OUTPATIENT
Start: 2017-02-14 | End: 2017-02-14 | Stop reason: HOSPADM

## 2017-02-14 RX ORDER — MORPHINE SULFATE 2 MG/ML
2 INJECTION, SOLUTION INTRAMUSCULAR; INTRAVENOUS EVERY 2 HOUR PRN
Status: DISCONTINUED | OUTPATIENT
Start: 2017-02-14 | End: 2017-02-15

## 2017-02-14 RX ORDER — MORPHINE SULFATE 30 MG/1
15 TABLET ORAL EVERY 4 HOURS PRN
Status: DISCONTINUED | OUTPATIENT
Start: 2017-02-14 | End: 2017-02-15

## 2017-02-14 RX ORDER — ACETAMINOPHEN 500 MG
1000 TABLET ORAL EVERY 8 HOURS
Status: DISCONTINUED | OUTPATIENT
Start: 2017-02-14 | End: 2017-02-22

## 2017-02-14 RX ADMIN — SUCCINYLCHOLINE CHLORIDE 140 MG: 20 INJECTION INTRAMUSCULAR; INTRAVENOUS at 15:31:00

## 2017-02-14 RX ADMIN — ONDANSETRON 4 MG: 2 INJECTION INTRAMUSCULAR; INTRAVENOUS at 16:15:00

## 2017-02-14 NOTE — PROGRESS NOTES
[facility]    Progress Note    Micah Peralta Patient Status:  Inpatient    1980 MRN [id number]   Location [facility] 4W/SW/SE Attending Rosalind Brumfield MD   Hosp Day # 4 PCP Aleksandar Cuevas MD       Subjective:   Arielle Mcgarry vascularity. This could represent a hematoma or abscess. Ir Fluid Drainage    2/13/2017  CONCLUSION:  1. Technically successful ultrasound-guided placement of subcutaneous abscess drain within right lower extremity.  Drain is to remain attached to LEONARDO b

## 2017-02-14 NOTE — ANESTHESIA POSTPROCEDURE EVALUATION
Patient: Bryant Irving    Procedure Summary     Date Anesthesia Start Anesthesia Stop Room / Location    02/14/17 1514  [facility] MAIN OR 09 / [facility] MAIN OR       Procedure Diagnosis Surgeon Responsible Provider    EXTREMITY LOWER INCISION & DRAINAGE (Right Pamela Connors

## 2017-02-14 NOTE — CONSULTS
[facility]    Report of Consultation    Dulce Carvalho Patient Status:  Inpatient    1980 MRN [id number]   Location [facility] 4W/SW/SE Attending Osmel Hand MD   Hosp Day # 4 PCP Aldine Runner, MD     Date of Admi injection 50 mL, 50 mL, Intravenous, PRN  •  insulin aspart (NOVOLOG) 100 UNIT/ML flexpen 1-5 Units, 1-5 Units, Subcutaneous, TID CC and HS  •  HYDROcodone-acetaminophen (NORCO) 5-325 MG per tab 1 tablet, 1 tablet, Oral, 6x Daily  •  dextrose injection 50 of the right lower leg with fluctuance and abscess present, it cellulitis extends posteriorly to the calf.   Foot is warm, there is no crepitus present, left leg is normal.  Skin: Skin color, texture, turgor normal. No rashes or lesions  Psychiatric: calm patient as well as her mother and father is etiology of the above. Discussed need for surgical drainage due to the fluctuance and abscess present.   I am concerned about her diabetes and infection getting out of control therefore urgent drainage is recomme

## 2017-02-14 NOTE — PLAN OF CARE
Diabetes/Glucose Control    • Glucose maintained within prescribed range Not Progressing          DISCHARGE PLANNING    • Discharge to home or other facility with appropriate resources Progressing        PAIN - ADULT    • Verbalizes/displays adequate comfo

## 2017-02-14 NOTE — ANESTHESIA PROCEDURE NOTES
Peripheral Block  Performed by: JASBIR GIFFORD  Authorized by: JASBIR GIFFORD    Patient Location:  Post-op  Start Time:  2/14/2017 4:22 PM  End Time:  2/14/2017 4:26 PM  Site Identification: ultrasound guided, real time ultrasound guided, surface landma

## 2017-02-14 NOTE — ANESTHESIA PREPROCEDURE EVALUATION
Anesthesia PreOp Note    HPI:     Pushpa Naranjo is a 39year old female who presents for preoperative consultation requested by: Abdirizak Kim MD    Date of Surgery: 2/14/2017    Procedure(s):  EXTREMITY LOWER INCISION & DRAINAGE  Indication: Absce capsule Rfl: 2 not taking       Current Facility-Administered Medications Ordered in Epic:  [MAR Hold] Insulin Regular Human (NOVOLIN R) 100 UNIT/ML injection 1-5 Units 1-5 Units Subcutaneous Avtar Sal MD 3 Units at 02/14/17 1218    [MAR Hold] Va 8.6-50 MG tab 2 tablet 2 tablet Oral Daily Jason Romo MD 2 tablet at 02/14/17 0823    [MAR Hold] PEG 3350 (MIRALAX) powder packet 17 g 17 g Oral Daily PRN Jonathan BARBOSA MD 17 g at 02/11/17 1426      No current Epic-ordered outpatient prescript Anesthesia ROS/Med Hx and Physical Exam     Patient summary reviewed    Airway   Mallampati: II  TM distance: >3 FB  Neck ROM: full  Dental      Pulmonary     breath sounds clear to auscultation  Cardiovascular     Rhythm: regular  Rate: normal  ROS commen

## 2017-02-14 NOTE — PROGRESS NOTES
[facility]  Progress Note    [facility] Patient Status:  Inpatient    1980 MRN [id number]   Location [facility] 4W/SW/SE Attending Shadi Salvador MD   Hosp Day # 4 PCP Guillermo Zuleta MD       Subjective:   Right low Rylee.            HUSSAIN Solomon  2/14/2017

## 2017-02-14 NOTE — BRIEF OP NOTE
[address] UNIT  Brief Op Note     [facility] Location: OR   [facility] [id number] MRN [id number]   Admission Date 2/10/2017 Operation Date 2/14/2017   Attending Physician Osmel Hand MD Operating Physician Jacquelyn Blandon MD

## 2017-02-15 ENCOUNTER — APPOINTMENT (OUTPATIENT)
Dept: GENERAL RADIOLOGY | Facility: HOSPITAL | Age: 37
DRG: 580 | End: 2017-02-15
Attending: HOSPITALIST
Payer: COMMERCIAL

## 2017-02-15 LAB
ANION GAP SERPL CALC-SCNC: 8 MMOL/L (ref 0–18)
BASOPHILS # BLD: 0 K/UL (ref 0–0.2)
BASOPHILS NFR BLD: 0 %
BUN SERPL-MCNC: 11 MG/DL (ref 8–20)
BUN/CREAT SERPL: 7.4 (ref 10–20)
CALCIUM SERPL-MCNC: 8.4 MG/DL (ref 8.5–10.5)
CHLORIDE SERPL-SCNC: 101 MMOL/L (ref 95–110)
CO2 SERPL-SCNC: 27 MMOL/L (ref 22–32)
CREAT SERPL-MCNC: 1.49 MG/DL (ref 0.5–1.5)
EOSINOPHIL # BLD: 0.3 K/UL (ref 0–0.7)
EOSINOPHIL NFR BLD: 3 %
ERYTHROCYTE [DISTWIDTH] IN BLOOD BY AUTOMATED COUNT: 12.3 % (ref 11–15)
GLUCOSE BLDC GLUCOMTR-MCNC: 200 MG/DL (ref 70–99)
GLUCOSE BLDC GLUCOMTR-MCNC: 204 MG/DL (ref 70–99)
GLUCOSE BLDC GLUCOMTR-MCNC: 206 MG/DL (ref 70–99)
GLUCOSE BLDC GLUCOMTR-MCNC: 225 MG/DL (ref 70–99)
GLUCOSE SERPL-MCNC: 255 MG/DL (ref 70–99)
HCT VFR BLD AUTO: 30.3 % (ref 35–48)
HGB BLD-MCNC: 10.2 G/DL (ref 12–16)
LYMPHOCYTES # BLD: 1.5 K/UL (ref 1–4)
LYMPHOCYTES NFR BLD: 18 %
MCH RBC QN AUTO: 29.9 PG (ref 27–32)
MCHC RBC AUTO-ENTMCNC: 33.5 G/DL (ref 32–37)
MCV RBC AUTO: 89.3 FL (ref 80–100)
MONOCYTES # BLD: 0.8 K/UL (ref 0–1)
MONOCYTES NFR BLD: 10 %
NEUTROPHILS # BLD AUTO: 5.7 K/UL (ref 1.8–7.7)
NEUTROPHILS NFR BLD: 69 %
OSMOLALITY UR CALC.SUM OF ELEC: 290 MOSM/KG (ref 275–295)
PLATELET # BLD AUTO: 293 K/UL (ref 140–400)
PMV BLD AUTO: 8 FL (ref 7.4–10.3)
POTASSIUM SERPL-SCNC: 4.2 MMOL/L (ref 3.3–5.1)
RBC # BLD AUTO: 3.4 M/UL (ref 3.7–5.4)
SODIUM SERPL-SCNC: 136 MMOL/L (ref 136–144)
WBC # BLD AUTO: 8.3 K/UL (ref 4–11)

## 2017-02-15 PROCEDURE — 02HV33Z INSERTION OF INFUSION DEVICE INTO SUPERIOR VENA CAVA, PERCUTANEOUS APPROACH: ICD-10-PCS | Performed by: HOSPITALIST

## 2017-02-15 PROCEDURE — 99233 SBSQ HOSP IP/OBS HIGH 50: CPT | Performed by: HOSPITALIST

## 2017-02-15 PROCEDURE — 71010 XR CHEST AP PORTABLE  (CPT=71010): CPT

## 2017-02-15 PROCEDURE — 99253 IP/OBS CNSLTJ NEW/EST LOW 45: CPT | Performed by: INTERNAL MEDICINE

## 2017-02-15 PROCEDURE — B5181ZA FLUOROSCOPY OF SUPERIOR VENA CAVA USING LOW OSMOLAR CONTRAST, GUIDANCE: ICD-10-PCS | Performed by: HOSPITALIST

## 2017-02-15 RX ORDER — HEPARIN SODIUM 5000 [USP'U]/ML
5000 INJECTION, SOLUTION INTRAVENOUS; SUBCUTANEOUS EVERY 12 HOURS SCHEDULED
Status: DISCONTINUED | OUTPATIENT
Start: 2017-02-15 | End: 2017-02-22

## 2017-02-15 RX ORDER — GABAPENTIN 300 MG/1
300 CAPSULE ORAL 3 TIMES DAILY
Status: DISCONTINUED | OUTPATIENT
Start: 2017-02-15 | End: 2017-02-22

## 2017-02-15 RX ORDER — LIDOCAINE HYDROCHLORIDE 10 MG/ML
0.5 INJECTION, SOLUTION INFILTRATION; PERINEURAL ONCE AS NEEDED
Status: ACTIVE | OUTPATIENT
Start: 2017-02-15 | End: 2017-02-15

## 2017-02-15 RX ORDER — OXYCODONE AND ACETAMINOPHEN 10; 325 MG/1; MG/1
1 TABLET ORAL EVERY 4 HOURS PRN
Status: DISCONTINUED | OUTPATIENT
Start: 2017-02-15 | End: 2017-02-17

## 2017-02-15 RX ORDER — SODIUM CHLORIDE 0.9 % (FLUSH) 0.9 %
10 SYRINGE (ML) INJECTION AS NEEDED
Status: DISCONTINUED | OUTPATIENT
Start: 2017-02-15 | End: 2017-02-22

## 2017-02-15 NOTE — PROGRESS NOTES
[facility] dosing service    Follow-up Pharmacokinetic Consult for Vancomycin Dosing     Dayanara Ortiz is a 39year old female who is being treated for cellulitis. Patient is on day 4 of Vancomycin 1750mg IV q8h. Goal trough is 10-15 ug/mL.       VAN follow and monitor renal function changes, toxicity and efficacy. Pharmacy will continue to follow her. We appreciate the opportunity to assist in her care.     Isacc Mccray [facility]  2/14/2017  11:35 PM

## 2017-02-15 NOTE — PROGRESS NOTES
PICC Line Insertion Note    Procedure:  Insertion of # 4 FR / Single Power Solo    Indications:  Long Term IV therapy    Procedure Details   Patient and/or significant others educated regarding insertion of PICC line, rationale for procedure, and after care

## 2017-02-15 NOTE — PLAN OF CARE
DISCHARGE PLANNING    • Discharge to home or other facility with appropriate resources Progressing    Plan is home    Diabetes/Glucose Control    • Glucose maintained within prescribed range Progressing    Accu checks ac/hs, 1800 ADA    PAIN - ADULT    • V

## 2017-02-15 NOTE — CONSULTS
INFECTIOUS DISEASE CONSULT NOTE    Luis Chow Patient Status:  Inpatient    1980 MRN [id number]   Location [facility] 4W/SW/SE Attending Fox Peñaloza, [facility] Day # 5 PCP Zara Noyola file. She reports that she drinks alcohol. She reports that she does not use illicit drugs.     Allergies:    Sulfa Antibiotics           Medications:    Current facility-administered medications:   •  insulin detemir (LEVEMIR) 100 UNIT/ML flextouch 30 Unit 1 tablet, Oral, Q4H PRN  •  HYDROmorphone HCl PF (DILAUDID) 1 MG/ML injection 1 mg, 1 mg, Intravenous, Q2H PRN  •  Senna-Docusate Sodium (SENOKOT S) 8.6-50 MG tab 2 tablet, 2 tablet, Oral, Daily  •  PEG 3350 (MIRALAX) powder packet 17 g, 17 g, Oral, Daily erythema, warmth or tenderness and low-grade temperature antibiotics changed to clindamycin. Developed fever to 101 at home came to ED for further evaluation. Doppler of the lower extremity without DVT. Admission temperature 100.1 with WBC of 10.2.   Sta

## 2017-02-15 NOTE — PROGRESS NOTES
[facility]  Progress Note    Dulce Carvalho Patient Status:  Inpatient    1980 MRN [id number]   Location [facility] 4W/SW/SE Attending Malick Pisano, [address] [facility] Day # 5 PCP Aldine Runner, MD       Subjective:   C/O sarah

## 2017-02-15 NOTE — PROGRESS NOTES
[facility] - [facility]    Progress Note    Reg Muro Patient Status:  Inpatient    1980 MRN [id number]   Location [facility] 4W/SW/SE Attending Stanton Salinas, [address] [facility] Day # 5 PCP Nestor Chambers MD       Subjective:   Adela Mckeon Intravenous Continuous   dextrose injection 50 mL 50 mL Intravenous PRN   dextrose injection 50 mL 50 mL Intravenous PRN   0.9%  NaCl infusion  Intravenous Continuous   FLUoxetine HCl (PROZAC) cap 40 mg 40 mg Oral Daily   Atorvastatin Calcium (LIPITOR) tab Cellulitis  And subcutaneous abscess of right leg  - status post drainage of 17 ml of purulent/hemorrhagic fluid- sent for cytology  - s/p debridement   - Picc placed.  ID consulted for recs - apprec input   - blood cx negative; await final cxs   - Appr

## 2017-02-15 NOTE — CM/SW NOTE
CTL/Rounds: MDO for HH to administer IV ABX and wound care, pt will have wound vac. [facility] chosen, referral sent. Will follow to assist as needed. [place] [id number]    MDO to arrange SNF because family is out of town.  Patient does not qualify per insuranc

## 2017-02-15 NOTE — CONSULTS
[facility]    Report of Consultation    Shelly Jeong Patient Status:  Inpatient    1980 MRN [id number]   Location [facility] 4W/SW/SE Attending Greta Chang, [address] [facility] Day # 5 PCP Yvonne Aldridge MD     Date of Ad immediate release tab 10 mg, 10 mg, Oral, Q4H PRN **OR** morphINE Sulfate IR (MSIR) tab 15 mg, 15 mg, Oral, Q4H PRN  •  morphINE sulfate (PF) 2 MG/ML injection 2 mg, 2 mg, Intravenous, Q2H PRN **OR** morphINE sulfate (PF) 4 MG/ML injection 4 mg, 4 mg, Intr cervical lymphadenopathy. Impression and Plan:  Patient Active Problem List:     Diabetes ([facility] [address]     High cholesterol     Plantar fasciitis     Cellulitis of right leg     Hyperglycemia     Hyponatremia    1.  Diabetes Mellitus Type 2, Uncontrolled  -Disc

## 2017-02-15 NOTE — CHRONIC PAIN
[facility]  Report of Consultation    Ignacia Davis Patient Status:  Inpatient    1980 MRN [id number]   Location [facility] 4W/SW/SE Attending Joe Lynn, [address] [facility] Day # 5 PCP Devantegerri Mayberry MD     Date of Admiss complaints. She reports that Leonce Remberto is ineffective.       Past Medical History   Diagnosis Date   • Diabetes ([facility] [address] 2012   • Hyperlipidemia    • Varicose vein of leg          Past Surgical History    TONSILLECTOMY      KNEE ARTHROSCOPY      Comment left knee MG per tab 1 tablet, 1 tablet, Oral, 6x Daily  •  dextrose injection 50 mL, 50 mL, Intravenous, PRN  •  0.9%  NaCl infusion, , Intravenous, Continuous  •  FLUoxetine HCl (PROZAC) cap 40 mg, 40 mg, Oral, Daily  •  Atorvastatin Calcium (LIPITOR) tab 10 mg, 1 136 02/15/2017   K 4.2 02/15/2017    02/15/2017   CO2 27 02/15/2017    02/15/2017   CA 8.4 02/15/2017       Imaging:        Impression:  Patient Active Problem List:     Diabetes ([facility] [address]     High cholesterol     Plantar fasciitis     Cellulitis

## 2017-02-15 NOTE — CONSULTS
pod1 right leg debridement and i and d pt had abductor canal block   Pt had good pain relief post op  Mo complications doing well cpm

## 2017-02-15 NOTE — WOUND PROGRESS NOTE
Wound Care Services  Consulted by Dr Naseem Lopez to assess the pt's right leg for vac dressings, s/p Radical debridement of infected right lower leg abscess, multiple compartments, loculated, subfascial, deep, intramuscular by Dr Naseem Lopez 2/14/17.  Spoke wit

## 2017-02-15 NOTE — PHYSICAL THERAPY NOTE
PHYSICAL THERAPY EVALUATION - INPATIENT     Room Number: 431/431-A  Evaluation Date: 2/15/2017  Type of Evaluation: Initial  Physician Order: PT Eval and Treat (gait training)    Presenting Problem: s/p radical debridement and drainage of infected RLE Good  Dynamic Sitting: Good  Static Standing: Fair  Dynamic Standing: Fair    ADDITIONAL TESTS  Additional Tests: None                                 NEUROLOGICAL FINDINGS  Neurological Findings: None                   ACTIVITY TOLERANCE  No shortness of gait pattern and decreased weight bearing with RLE. Patient limited by increased pain. She was left in bed at end of session with all needs in reach. She would benefit from one more session of physical therapy to improve endurance and for stoop training.  A

## 2017-02-15 NOTE — PROGRESS NOTES
[facility]    Progress Note    Dorcus Do Patient Status:  Inpatient    1980 MRN [id number]   Location [facility] 4W/SW/SE Attending Magalys Stewart, [address] Day # 5 PCP Sadi Whaley MD       Subjective:   Nichole Dennis appearance: alert, appears stated age and cooperative  Neck: no JVD and supple, symmetrical, trachea midline  Pulmonary:  no labored breathing, equal Ruster excursion  Cardiovascular: regular rate and rhythm  Abdominal: soft, non-tender; bowel sounds charlene

## 2017-02-15 NOTE — PROGRESS NOTES
[place] [facility] - [facility]  Progress Note      Danielle Nieto Patient Status:  Inpatient    1980 MRN [id number]   Location [facility] 4W/SW/SE Attending Jenny Sanchez, [address] Day # 5 PCP Chemo Bhagat MD       Subjective:   Pt james

## 2017-02-15 NOTE — OPERATIVE REPORT
[facility]    PATIENT'S NAME: JERONMIO SANDERS   ATTENDING PHYSICIAN: Artur Richards MD   OPERATING PHYSICIAN: Hira St MD   PATIENT ACCOUNT#:   [de-identified]    LOCATION:  [facility] [address] PACU 3 [facility] 10  MEDICAL RECORD #:   [id number]       D respiratory failure, renal failure, pulmonary embolus, DVT, and even death were all discussed in detail with the patient as well as her mother and father both who understand, consent, and wish to proceed with the operation.     PROCEDURE:  The patient was b to recovery room, extubated, in stable condition. All counts were correct at the end of the case.     (Also Job [id number])    Dictated By Niles Aranda., MD  d: 02/14/2017 16:51:40  t: 02/14/2017 17:24:33  Job [id number]  MM/    cc: Pranav Briceño

## 2017-02-16 LAB
ANION GAP SERPL CALC-SCNC: 9 MMOL/L (ref 0–18)
BASOPHILS # BLD: 0.1 K/UL (ref 0–0.2)
BASOPHILS NFR BLD: 1 %
BILIRUB UR QL: NEGATIVE
BUN SERPL-MCNC: 12 MG/DL (ref 8–20)
BUN/CREAT SERPL: 7 (ref 10–20)
CALCIUM SERPL-MCNC: 8.5 MG/DL (ref 8.5–10.5)
CHLORIDE SERPL-SCNC: 103 MMOL/L (ref 95–110)
CLARITY UR: CLEAR
CO2 SERPL-SCNC: 25 MMOL/L (ref 22–32)
COLOR UR: YELLOW
CREAT SERPL-MCNC: 1.71 MG/DL (ref 0.5–1.5)
CREAT UR-MCNC: 38.4 MG/DL
EOSINOPHIL # BLD: 0.3 K/UL (ref 0–0.7)
EOSINOPHIL NFR BLD: 4 %
ERYTHROCYTE [DISTWIDTH] IN BLOOD BY AUTOMATED COUNT: 12.3 % (ref 11–15)
GLUCOSE BLDC GLUCOMTR-MCNC: 114 MG/DL (ref 70–99)
GLUCOSE BLDC GLUCOMTR-MCNC: 181 MG/DL (ref 70–99)
GLUCOSE BLDC GLUCOMTR-MCNC: 219 MG/DL (ref 70–99)
GLUCOSE BLDC GLUCOMTR-MCNC: 285 MG/DL (ref 70–99)
GLUCOSE SERPL-MCNC: 198 MG/DL (ref 70–99)
HCT VFR BLD AUTO: 32 % (ref 35–48)
HGB BLD-MCNC: 10.9 G/DL (ref 12–16)
KETONES UR-MCNC: NEGATIVE MG/DL
LEUKOCYTE ESTERASE UR QL STRIP.AUTO: NEGATIVE
LYMPHOCYTES # BLD: 1.6 K/UL (ref 1–4)
LYMPHOCYTES NFR BLD: 19 %
MAGNESIUM SERPL-MCNC: 1.9 MG/DL (ref 1.8–2.5)
MCH RBC QN AUTO: 30.2 PG (ref 27–32)
MCHC RBC AUTO-ENTMCNC: 34 G/DL (ref 32–37)
MCV RBC AUTO: 88.8 FL (ref 80–100)
MONOCYTES # BLD: 0.7 K/UL (ref 0–1)
MONOCYTES NFR BLD: 9 %
NEUTROPHILS # BLD AUTO: 5.7 K/UL (ref 1.8–7.7)
NEUTROPHILS NFR BLD: 68 %
NITRITE UR QL STRIP.AUTO: NEGATIVE
OSMOLALITY UR CALC.SUM OF ELEC: 289 MOSM/KG (ref 275–295)
PH UR: 6 [PH] (ref 5–8)
PHOSPHATE SERPL-MCNC: 5 MG/DL (ref 2.4–4.7)
PLATELET # BLD AUTO: 310 K/UL (ref 140–400)
PMV BLD AUTO: 7.6 FL (ref 7.4–10.3)
POTASSIUM SERPL-SCNC: 4 MMOL/L (ref 3.3–5.1)
PROT UR-MCNC: NEGATIVE MG/DL
RBC # BLD AUTO: 3.6 M/UL (ref 3.7–5.4)
RBC #/AREA URNS AUTO: 2 /HPF
SODIUM SERPL-SCNC: 137 MMOL/L (ref 136–144)
SODIUM UR-SCNC: 52 MMOL/L
SP GR UR STRIP: 1.01 (ref 1–1.03)
UROBILINOGEN UR STRIP-ACNC: <2
VANCOMYCIN TROUGH SERPL-MCNC: 22.5 MCG/ML (ref 10–20)
VIT C UR-MCNC: NEGATIVE MG/DL
WBC # BLD AUTO: 8.3 K/UL (ref 4–11)
WBC #/AREA URNS AUTO: 1 /HPF

## 2017-02-16 PROCEDURE — 99232 SBSQ HOSP IP/OBS MODERATE 35: CPT | Performed by: INTERNAL MEDICINE

## 2017-02-16 PROCEDURE — 99254 IP/OBS CNSLTJ NEW/EST MOD 60: CPT | Performed by: INTERNAL MEDICINE

## 2017-02-16 PROCEDURE — 99233 SBSQ HOSP IP/OBS HIGH 50: CPT | Performed by: HOSPITALIST

## 2017-02-16 RX ORDER — DEXTROSE MONOHYDRATE 25 G/50ML
50 INJECTION, SOLUTION INTRAVENOUS AS NEEDED
Status: DISCONTINUED | OUTPATIENT
Start: 2017-02-16 | End: 2017-02-22

## 2017-02-16 NOTE — PROGRESS NOTES
INFECTIOUS DISEASE PROGRESS NOTE    Moises Do Patient Status:  Inpatient    1980 MRN [id number]   Location [facility] 4W/SW/SE Attending Pamela Samano, 1604 [facility] Day # 6 PCP Terri Chambers MD     Subjective:  Afebrile urinating. with WBC of 10.2.  Started on vancomycin and Zosyn.  Blood cultures no growth to date.  Ultrasound 2/12 showed ill-defined heterogeneous collection in the medial aspect of the midcalf measured 4.4 cm concerning for hematoma versus abscess.  On 2/13 underwe

## 2017-02-16 NOTE — PROGRESS NOTES
[place] [facility] - [facility]    Progress Note    Nasima Somers Patient Status:  Inpatient    1980 MRN [id number]   Location [facility] [place] 4W/SW/SE Attending Baldemar Almanzar, 1604 [facility] Day # 6 PCP Ember Noble MD       Subjective:   Jose Mess 1,000 mg Oral Q8H   lactated ringers infusion  Intravenous Continuous   dextrose injection 50 mL 50 mL Intravenous PRN   dextrose injection 50 mL 50 mL Intravenous PRN   0.9%  NaCl infusion  Intravenous Continuous   FLUoxetine HCl (PROZAC) cap 40 mg 40 mg K  4.5  4.2  4.0   CL  101  101  103   CO2  27  27  25             Assessment and Plan:     Cellulitis  And subcutaneous abscess of right leg  - status post drainage of 17 ml of purulent/hemorrhagic fluid- sent for cytology  - s/p debridement   - Picc pl

## 2017-02-16 NOTE — PROGRESS NOTES
[facility] - [facility]    Diabetes Education  Note    Charmayne Manis Patient Status:  Inpatient   1980 MRN [id number]  Location [facility] 4W/SW/SE Attending Mercedes Fenton Day # 6 PCP Vita Valencia MD    Reason for [facility]

## 2017-02-16 NOTE — PLAN OF CARE
DISCHARGE PLANNING    • Discharge to home or other facility with appropriate resources Progressing        Diabetes/Glucose Control    • Glucose maintained within prescribed range Progressing        HEMATOLOGIC - ADULT    • Maintains hematologic stability P

## 2017-02-16 NOTE — WOUND PROGRESS NOTE
WOUND CARE NOTE      PLAN   Recommendations:  Dr. Lola Irene is following the pt. VAC standing orders  VAC troubleshooting instructions on the machine  Staff to monitor VAC seal and repair seal if indicated.   Follow VAC troubleshooting instructions  Rout 02/16/2017   HCT 32.0* 02/16/2017    02/16/2017   CREATSERUM 1.71* 02/16/2017   BUN 12 02/16/2017    02/16/2017   K 4.0 02/16/2017    02/16/2017   CO2 25 02/16/2017   * 02/16/2017   CA 8.5 02/16/2017   ALB 2.7* 02/10/2017   ALK[facility]

## 2017-02-16 NOTE — PROGRESS NOTES
120 [facility] dosing service    Follow-up Pharmacokinetic Consult for Vancomycin Dosing     Nasima Somers is a 39year old female who is being treated for cellulitis. Patient is on day 5 of Vancomycin 1.75 gm IV Q 12 hours. Goal trough is 10-15 ug/mL. daily while on Vancomycin to assess renal function. 4.  Pharmacy will follow and monitor renal function changes, toxicity and efficacy. Pharmacy will continue to follow her. We appreciate the opportunity to assist in her care.     [place],

## 2017-02-16 NOTE — PHYSICAL THERAPY NOTE
Attempted x 3 to try to see Pt this morning and Pt is sleeping, washing her hair and vac RN eduction in room. Will try to see Pt later.

## 2017-02-16 NOTE — CM/SW NOTE
CTL/Rounds: [facility] has received auth to follow patient. Referral sent to [facility], Luca Encarnacion [id number] notified. Bony STONER [id number]    Spoke with patient at bedside regarding discharge needs. Patient states that her mother will move in with her and assist as needed.  Per

## 2017-02-16 NOTE — PLAN OF CARE
DISCHARGE PLANNING    • Discharge to home or other facility with appropriate resources Progressing    PLAN IS TO GO HOME ON ABX WITH PICC LINE AND WOUND VAC AND INSULIN PEN.      Diabetes/Glucose Control    • Glucose maintained within prescribed range Progr

## 2017-02-16 NOTE — CONSULTS
[facility]    Report of Consultation    Date of Admission:  2/10/2017  Date of Consult:  2/16/2017   Reason for Consultation:     CHAD     History of Present Illness:     Neida Queen is a 39 yrs old female with pmh of uncontrolled DM II gabapentin (NEURONTIN) cap 300 mg 300 mg Oral TID   oxyCODONE-acetaminophen (PERCOCET)  MG per tab 1 tablet 1 tablet Oral Q4H PRN   Normal Saline Flush 0.9 % injection 10 mL 10 mL Intravenous PRN   ondansetron HCl (ZOFRAN) injection 8 mg 8 mg Intra 5' 7\" (170.2 cm) (02/16 1134)  Weight: --  BSA (Calculated - sq m): --  Pulse: 88 (02/16 0552)  BP: 125/61 mmHg (02/16 0552)  Temp: 98.6 °F (37 °C) (02/16 0552)  Do Not Use - Resp Rate: --  SpO2: 99 % (02/16 0552)  Temp:  [98.5 °F (36.9 °C)-99.5 °F (37.5 creatinine at 0.8 mg/dl with an eGFR >60 ml/min  - CHAD presumably secondary to ischemic injury for vancomycin toxicity   - rule out AIN - given on PCN and Vancomycin   - no peripheral eosinophilia   - UA negative on admission - repeat UA.  Check urine Na, c

## 2017-02-16 NOTE — PHYSICAL THERAPY NOTE
PHYSICAL THERAPY TREATMENT NOTE - INPATIENT    Room Number: 431/431-A       Presenting Problem: s/p radical debridement and drainage of infected RLE abscess    Problem List  Principal Problem:    Cellulitis of right leg  Active Problems:    Hyperglycemia with arms (e.g., wheelchair, bedside commode, etc.): None   -   Moving from lying on back to sitting on the side of the bed?: None   How much help from another person does the patient currently need. ..   -   Moving to and from a bed to a chair (including a

## 2017-02-16 NOTE — PROGRESS NOTES
[facility]    Progress Note    Dulce Carvalho Patient Status:  Inpatient    1980 MRN [id number]   Location [facility] 4W/SW/SE Attending Malick Pisano, 1604 [facility] Day # 6 PCP Aldine Runner, MD     Subjective:  Jim Barkley

## 2017-02-16 NOTE — PROGRESS NOTES
[facility]    Progress Note    Danielle Nieto Patient Status:  Inpatient    1980 MRN [id number]   Location [facility] 4W/SW/SE Attending Jenny Sanchez, 1604 [facility] Day # 6 PCP Chemo Bhagat MD       Subjective:   Ion Poole Lab  02/14/17   0550  02/15/17   0611  02/16/17   0541   RBC  3.68*  3.40*  3.60*   HGB  11.1*  10.2*  10.9*   HCT  32.8*  30.3*  32.0*   MCV  89.2  89.3  88.8   MCH  30.0  29.9  30.2   MCHC  33.7  33.5  34.0   RDW  12.0  12.3  12.3   WBC  7.4  8.3  8.3

## 2017-02-17 ENCOUNTER — APPOINTMENT (OUTPATIENT)
Dept: WOUND CARE | Facility: HOSPITAL | Age: 37
DRG: 580 | End: 2017-02-17
Attending: HOSPITALIST
Payer: COMMERCIAL

## 2017-02-17 ENCOUNTER — TELEPHONE (OUTPATIENT)
Dept: INTERNAL MEDICINE CLINIC | Facility: CLINIC | Age: 37
End: 2017-02-17

## 2017-02-17 DIAGNOSIS — E11.9 TYPE 2 DIABETES MELLITUS WITHOUT COMPLICATION, WITH LONG-TERM CURRENT USE OF INSULIN (HCC): Primary | ICD-10-CM

## 2017-02-17 DIAGNOSIS — L03.115 CELLULITIS OF RIGHT LEG: ICD-10-CM

## 2017-02-17 DIAGNOSIS — Z79.4 TYPE 2 DIABETES MELLITUS WITHOUT COMPLICATION, WITH LONG-TERM CURRENT USE OF INSULIN (HCC): Primary | ICD-10-CM

## 2017-02-17 LAB
ANION GAP SERPL CALC-SCNC: 10 MMOL/L (ref 0–18)
BASOPHILS # BLD: 0.1 K/UL (ref 0–0.2)
BASOPHILS NFR BLD: 1 %
BUN SERPL-MCNC: 10 MG/DL (ref 8–20)
BUN/CREAT SERPL: 6.4 (ref 10–20)
CALCIUM SERPL-MCNC: 8.6 MG/DL (ref 8.5–10.5)
CHLORIDE SERPL-SCNC: 103 MMOL/L (ref 95–110)
CO2 SERPL-SCNC: 25 MMOL/L (ref 22–32)
CREAT SERPL-MCNC: 1.57 MG/DL (ref 0.5–1.5)
EOSINOPHIL # BLD: 0.2 K/UL (ref 0–0.7)
EOSINOPHIL NFR BLD: 4 %
ERYTHROCYTE [DISTWIDTH] IN BLOOD BY AUTOMATED COUNT: 12.4 % (ref 11–15)
GLUCOSE BLDC GLUCOMTR-MCNC: 112 MG/DL (ref 70–99)
GLUCOSE BLDC GLUCOMTR-MCNC: 115 MG/DL (ref 70–99)
GLUCOSE BLDC GLUCOMTR-MCNC: 197 MG/DL (ref 70–99)
GLUCOSE BLDC GLUCOMTR-MCNC: 201 MG/DL (ref 70–99)
GLUCOSE BLDC GLUCOMTR-MCNC: 262 MG/DL (ref 70–99)
GLUCOSE SERPL-MCNC: 202 MG/DL (ref 70–99)
HCT VFR BLD AUTO: 30.2 % (ref 35–48)
HGB BLD-MCNC: 10.1 G/DL (ref 12–16)
LYMPHOCYTES # BLD: 1.1 K/UL (ref 1–4)
LYMPHOCYTES NFR BLD: 17 %
MCH RBC QN AUTO: 29.5 PG (ref 27–32)
MCHC RBC AUTO-ENTMCNC: 33.5 G/DL (ref 32–37)
MCV RBC AUTO: 88.2 FL (ref 80–100)
MONOCYTES # BLD: 0.6 K/UL (ref 0–1)
MONOCYTES NFR BLD: 9 %
NEUTROPHILS # BLD AUTO: 4.4 K/UL (ref 1.8–7.7)
NEUTROPHILS NFR BLD: 69 %
OSMOLALITY UR CALC.SUM OF ELEC: 291 MOSM/KG (ref 275–295)
PLATELET # BLD AUTO: 258 K/UL (ref 140–400)
PMV BLD AUTO: 7.7 FL (ref 7.4–10.3)
POTASSIUM SERPL-SCNC: 4.2 MMOL/L (ref 3.3–5.1)
RBC # BLD AUTO: 3.43 M/UL (ref 3.7–5.4)
SODIUM SERPL-SCNC: 138 MMOL/L (ref 136–144)
VANCOMYCIN SERPL-MCNC: 23 MCG/ML
WBC # BLD AUTO: 6.4 K/UL (ref 4–11)

## 2017-02-17 PROCEDURE — 99232 SBSQ HOSP IP/OBS MODERATE 35: CPT | Performed by: INTERNAL MEDICINE

## 2017-02-17 PROCEDURE — 99233 SBSQ HOSP IP/OBS HIGH 50: CPT | Performed by: HOSPITALIST

## 2017-02-17 RX ORDER — 0.9 % SODIUM CHLORIDE 0.9 %
VIAL (ML) INJECTION
Status: DISPENSED
Start: 2017-02-17 | End: 2017-02-18

## 2017-02-17 RX ORDER — OXYCODONE AND ACETAMINOPHEN 10; 325 MG/1; MG/1
1 TABLET ORAL
Status: DISCONTINUED | OUTPATIENT
Start: 2017-02-17 | End: 2017-02-22

## 2017-02-17 RX ORDER — DIPHENHYDRAMINE HYDROCHLORIDE 50 MG/ML
25 INJECTION INTRAMUSCULAR; INTRAVENOUS EVERY 4 HOURS PRN
Status: DISCONTINUED | OUTPATIENT
Start: 2017-02-17 | End: 2017-02-22

## 2017-02-17 NOTE — PROGRESS NOTES
INFECTIOUS DISEASE PROGRESS NOTE    Stephan Santos Patient Status:  Inpatient    1980 MRN [id number]   Location [facility] 4W/SW/SE Attending Luigi Navarro, 1604 [facility] Day # 7 PCP Suanne Lombard, MD     Subjective:  Afebrile.  New rash Admission temperature 100.1 with WBC of 10.2.  Started on vancomycin and Zosyn.  Blood cultures no growth to date.  Ultrasound 2/12 showed ill-defined heterogeneous collection in the medial aspect of the midcalf measured 4.4 cm concerning for hematoma vers

## 2017-02-17 NOTE — PHYSICAL THERAPY NOTE
PHYSICAL THERAPY TREATMENT NOTE - INPATIENT    Room Number: 431/431-A       Presenting Problem: s/p radical debridement and drainage of infected RLE abscess    Problem List  Principal Problem:    Cellulitis of right leg  Active Problems:    Hyperglycemia shortness of breath    AM-PAC '6-Clicks' INPATIENT SHORT FORM - BASIC MOBILITY  How much difficulty does the patient currently have. ..  -   Turning over in bed (including adjusting bedclothes, sheets and blankets)?: A Little   -   Sitting down on and stand

## 2017-02-17 NOTE — TELEPHONE ENCOUNTER
Received call from wound care nurse in regards to needing a HMO referral for wound vac that needs to be signed. They are faxing it to the [facility] office, and just want Dr. Hyacinth Kelley to be aware prior to receiving the form.

## 2017-02-17 NOTE — TELEPHONE ENCOUNTER
Patient will be release  from [facility] Monday she needs a referral for a wound Vacc and a referral to see Frida Juarez

## 2017-02-17 NOTE — TELEPHONE ENCOUNTER
She needs to be seen because I am the PCP and am responsible for coordinating her outpatient care. She has to see me too.

## 2017-02-17 NOTE — PLAN OF CARE
Pt was able to rest most of the night, this nurse worked out a schedule with her pain med administration through out the night. Pt is ambulating well and voiding. Will continue to monitor for safety and pain with the pt.

## 2017-02-17 NOTE — WOUND PROGRESS NOTE
Wound care services  Follow up the home vac approval, VANDANA needs a O referral for the home vac.  Dr Moises Beaulieu signed the home vac paperwork, they are asking for the primary care MD to sign the HMO referral, called and left a message at Dr Grace Garcia office

## 2017-02-17 NOTE — PROGRESS NOTES
[place] [facility] - [facility]    Progress Note    Renell Quivers Patient Status:  Inpatient    1980 MRN [id number]   Location [facility] 4W/SW/SE Attending Ko Giraldo, 1604 [facility] Day # 7 PCP Joshua Lopez MD       Subjective:   Georgiann Councilman 12.4   WBC  8.3  8.3  6.4   PLT  293  310  258     No results found for: PT, INR    Recent Labs   Lab  02/15/17   0611  02/16/17   0541  02/17/17   0600   GLU  255*  198*  202*   BUN  11  12  10   CREATSERUM  1.49  1.71*  1.57*   GFRAA  48*  41*  45*   GFR

## 2017-02-17 NOTE — PROGRESS NOTES
[place] [facility] - [facility]    Progress Note    Thompson Drain Patient Status:  Inpatient    1980 MRN [id number]   Location [facility] [place] [place] 4W/SW/SE Attending Greer Ross, 1604 [facility] Day # 7 PCP Jenna Carrero MD       Subjective:   Eugene Gonzalez acetaminophen (TYLENOL EXTRA STRENGTH) tab 1,000 mg 1,000 mg Oral Q8H   lactated ringers infusion  Intravenous Continuous   FLUoxetine HCl (PROZAC) cap 40 mg 40 mg Oral Daily   Atorvastatin Calcium (LIPITOR) tab 10 mg 10 mg Oral Nightly   HYDROmorphone H of 17 ml of purulent/hemorrhagic fluid- sent for cytology  - s/p debridement   - Picc placed.  ID consulted for recs - apprec input   - blood cx negative; await final cxs   - Appreciate IR reccs  ; wound vac in place   - continue norco q 4 hours scheduled a

## 2017-02-17 NOTE — PROGRESS NOTES
[facility]    Progress Note    Danielle Nieto Patient Status:  Inpatient    1980 MRN [id number]   Location [facility] 4W/SW/SE Attending Jenny Sanchez, 1604 [facility] Day # 7 PCP Cheom Bhagat MD     Subjective:  Cecelia Campbell

## 2017-02-17 NOTE — PROGRESS NOTES
[facility]  Progress Note      Jason Liner Patient Status:  Inpatient    1980 MRN [id number]   Location [facility] 4W/SW/SE Attending Maryln Denver, 1604 [facility] Day # 7 PCP Rose Marie Brumfield MD       Subjective:   Jesus Loud

## 2017-02-17 NOTE — TELEPHONE ENCOUNTER
Patient returning message states is in the hospital and will have a lot of appt with different doctors patient is wanting to know what she needs to be seen for.

## 2017-02-17 NOTE — PROGRESS NOTES
[place] [facility] - [facility]    Progress Note      Subjective:     New rash on torso - denies any fever, chills       Review of Systems:     Constitutional: negative for fatigue, fevers    Eyes: negative for irritation, redness    Ears, nose, mouth, throat oxyCODONE-acetaminophen (PERCOCET)  MG per tab 1 tablet 1 tablet Oral Q3H PRN   DiphenhydrAMINE HCl (BENADRYL) injection 25 mg 25 mg Intravenous Q4H PRN   insulin aspart (NOVOLOG) 100 UNIT/ML flexpen 12 Units 12 Units Subcutaneous TID CC   dextrose 72 hours.   Recent Labs   Lab  02/16/17   0541   MG  1.9   PHOS  5.0*       Lab Results  Component Value Date   COLORUR Yellow 02/16/2017   CLARITY Clear 02/16/2017   SPECGRAVITY 1.008 02/16/2017   GLUUR Trace 02/16/2017   BILUR Negative 02/16/2017   KETJOÃO

## 2017-02-17 NOTE — WOUND PROGRESS NOTE
Consulted to apply the vac dressing to the pt's right leg wounds today, Spoke with the pt's nurse this am, and 10 am time was scheduled, at that time the pt's PICC line is not flushing and the nurse was trying to get an IV site in, so that the pt. could re

## 2017-02-17 NOTE — CHRONIC PAIN
[facility] [place] [facility]  Anesthesiology Pain Management Progress Note      Patient name: Charmayne Manis 39year old female  : 1980  MRN: [id number]    Diagnosis: Cellulitis of right leg  (primary encounter diagnosis)  Hyperglycemia  Hyponatremia

## 2017-02-18 LAB
ANION GAP SERPL CALC-SCNC: 8 MMOL/L (ref 0–18)
BASOPHILS # BLD: 0 K/UL (ref 0–0.2)
BASOPHILS NFR BLD: 0 %
BUN SERPL-MCNC: 13 MG/DL (ref 8–20)
BUN/CREAT SERPL: 7.8 (ref 10–20)
CALCIUM SERPL-MCNC: 8.6 MG/DL (ref 8.5–10.5)
CHLORIDE SERPL-SCNC: 102 MMOL/L (ref 95–110)
CO2 SERPL-SCNC: 26 MMOL/L (ref 22–32)
CREAT SERPL-MCNC: 1.67 MG/DL (ref 0.5–1.5)
EOSINOPHIL # BLD: 0.3 K/UL (ref 0–0.7)
EOSINOPHIL NFR BLD: 3 %
ERYTHROCYTE [DISTWIDTH] IN BLOOD BY AUTOMATED COUNT: 12.4 % (ref 11–15)
GLUCOSE BLDC GLUCOMTR-MCNC: 109 MG/DL (ref 70–99)
GLUCOSE BLDC GLUCOMTR-MCNC: 156 MG/DL (ref 70–99)
GLUCOSE BLDC GLUCOMTR-MCNC: 180 MG/DL (ref 70–99)
GLUCOSE BLDC GLUCOMTR-MCNC: 185 MG/DL (ref 70–99)
GLUCOSE SERPL-MCNC: 185 MG/DL (ref 70–99)
HCT VFR BLD AUTO: 32.5 % (ref 35–48)
HGB BLD-MCNC: 10.9 G/DL (ref 12–16)
LYMPHOCYTES # BLD: 1.2 K/UL (ref 1–4)
LYMPHOCYTES NFR BLD: 13 %
MAGNESIUM SERPL-MCNC: 1.9 MG/DL (ref 1.8–2.5)
MCH RBC QN AUTO: 29.9 PG (ref 27–32)
MCHC RBC AUTO-ENTMCNC: 33.7 G/DL (ref 32–37)
MCV RBC AUTO: 88.8 FL (ref 80–100)
MONOCYTES # BLD: 0.8 K/UL (ref 0–1)
MONOCYTES NFR BLD: 8 %
NEUTROPHILS # BLD AUTO: 7.1 K/UL (ref 1.8–7.7)
NEUTROPHILS NFR BLD: 76 %
OSMOLALITY UR CALC.SUM OF ELEC: 287 MOSM/KG (ref 275–295)
PLATELET # BLD AUTO: 278 K/UL (ref 140–400)
PMV BLD AUTO: 7.6 FL (ref 7.4–10.3)
POTASSIUM SERPL-SCNC: 4.2 MMOL/L (ref 3.3–5.1)
RBC # BLD AUTO: 3.66 M/UL (ref 3.7–5.4)
SODIUM SERPL-SCNC: 136 MMOL/L (ref 136–144)
VANCOMYCIN TROUGH SERPL-MCNC: 6.1 MCG/ML (ref 10–20)
WBC # BLD AUTO: 9.4 K/UL (ref 4–11)

## 2017-02-18 PROCEDURE — 99233 SBSQ HOSP IP/OBS HIGH 50: CPT | Performed by: HOSPITALIST

## 2017-02-18 PROCEDURE — 99232 SBSQ HOSP IP/OBS MODERATE 35: CPT | Performed by: INTERNAL MEDICINE

## 2017-02-18 RX ORDER — SODIUM CHLORIDE 9 MG/ML
INJECTION, SOLUTION INTRAVENOUS
Status: COMPLETED
Start: 2017-02-18 | End: 2017-02-18

## 2017-02-18 RX ORDER — SODIUM CHLORIDE 9 MG/ML
INJECTION, SOLUTION INTRAVENOUS CONTINUOUS
Status: DISCONTINUED | OUTPATIENT
Start: 2017-02-18 | End: 2017-02-22

## 2017-02-18 NOTE — PROGRESS NOTES
[facility]    Progress Note    Moises Do Patient Status:  Inpatient    1980 MRN [id number]   Location [facility] 4W/SW/SE Attending Pamela Samano, 1604 [facility] Day # 8 PCP Terri Chambers MD       Subjective:   Camila Ramirez HCl (ZOFRAN) injection 8 mg 8 mg Intravenous Q6H PRN   acetaminophen (TYLENOL EXTRA STRENGTH) tab 1,000 mg 1,000 mg Oral Q8H   FLUoxetine HCl (PROZAC) cap 40 mg 40 mg Oral Daily   Atorvastatin Calcium (LIPITOR) tab 10 mg 10 mg Oral Nightly   HYDROmorphone place   - continue norco q 4 hours scheduled and dilaudid for breakthrough pain. apprec pain consult   - Zosyn on hold due to rash.  Vanco on hold due to elevated trough       Morbid Obesity  - counselled on diet and exercise      DM2  accuchecks AC and HS

## 2017-02-18 NOTE — PROGRESS NOTES
[facility]    Progress Note    Cira Gupta Patient Status:  Inpatient    1980 MRN [id number]   Location [facility] 4W/SW/SE Attending Chandni Dunlap, 1604 [facility] Day # 8 PCP Bryan Flores MD       Subjective:   Jose Jackson 278     No results found for: PT, INR    Recent Labs   Lab  02/16/17   0541  02/17/17   0600  02/18/17   0853   GLU  198*  202*  185*   BUN  12  10  13   CREATSERUM  1.71*  1.57*  1.67*   GFRAA  41*  45*  42*   GFRNAA  34*  37*  35*   CA  8.5  8.6  8.6   N

## 2017-02-18 NOTE — PLAN OF CARE
DISCHARGE PLANNING    • Discharge to home or other facility with appropriate resources Progressing    Referrals sent for home health for wound vac and IV antibiotics    Diabetes/Glucose Control    • Glucose maintained within prescribed range Progressing and if this does not work the Sanjay's RN is to be called.

## 2017-02-18 NOTE — PHYSICAL THERAPY NOTE
PHYSICAL THERAPY TREATMENT NOTE - INPATIENT    Room Number: 431/431-A       Presenting Problem: s/p radical debridement and drainage of infected RLE abscess    Problem List  Principal Problem:    Cellulitis of right leg  Active Problems:    Hyperglycemia Static Sitting: Good  Dynamic Sitting: Good           Static Standing: Good  Dynamic Standing: Not tested    ACTIVITY TOLERANCE  Room air  No shortness of breath    AM-PAC '6-Clicks' INPATIENT SHORT FORM - BASIC MOBILITY  How much difficulty does t

## 2017-02-18 NOTE — PROGRESS NOTES
INFECTIOUS DISEASE PROGRESS NOTE    Arlette Zhao Patient Status:  Inpatient    1980 MRN [id number]   Location [facility] 4W/SW/SE Attending Evelina Moyer, 1604 [facility] Day # 8 PCP Dino Laird MD     Subjective:  Afebrile.  Low grade for further evaluation.  Doppler of the lower extremity without DVT.  Admission temperature 100.1 with WBC of 10.2.  Started on vancomycin and Zosyn.  Blood cultures no growth to date.  Ultrasound 2/12 showed ill-defined heterogeneous collection in the med

## 2017-02-18 NOTE — PLAN OF CARE
Pt is receiving benadryl along with her prn percocet to help with the itching she has developed in her leg, pt states it also allows her to get some much needed rest. Pt ambulates well to the bathroom and is voiding well.  Will continue to monitor for safet

## 2017-02-18 NOTE — CHRONIC PAIN
[facility]  Anesthesiology Pain Management Progress Note      Patient name: Jelani Hughes 39year old female  : 1980  MRN: [id number]    Diagnosis: Cellulitis of right leg  (primary encounter diagnosis)  Hyperglycemia  Hyponatremi

## 2017-02-18 NOTE — PROGRESS NOTES
[facility]  Progress Note       Neg Patient Status:  Inpatient    1980 MRN [id number]   Location [facility] 4W/SW/SE Attending Negar Jamil, 1604 [facility] Day # 8 PCP Bernice Crowder MD       Subjective:   Ms. Jose Garcia

## 2017-02-19 LAB
ANION GAP SERPL CALC-SCNC: 7 MMOL/L (ref 0–18)
BASOPHILS # BLD: 0 K/UL (ref 0–0.2)
BASOPHILS NFR BLD: 0 %
BUN SERPL-MCNC: 17 MG/DL (ref 8–20)
BUN/CREAT SERPL: 9.3 (ref 10–20)
CALCIUM SERPL-MCNC: 8.4 MG/DL (ref 8.5–10.5)
CHLORIDE SERPL-SCNC: 104 MMOL/L (ref 95–110)
CO2 SERPL-SCNC: 25 MMOL/L (ref 22–32)
CREAT SERPL-MCNC: 1.83 MG/DL (ref 0.5–1.5)
EOSINOPHIL # BLD: 0.2 K/UL (ref 0–0.7)
EOSINOPHIL NFR BLD: 3 %
ERYTHROCYTE [DISTWIDTH] IN BLOOD BY AUTOMATED COUNT: 12.5 % (ref 11–15)
GLUCOSE BLDC GLUCOMTR-MCNC: 145 MG/DL (ref 70–99)
GLUCOSE BLDC GLUCOMTR-MCNC: 175 MG/DL (ref 70–99)
GLUCOSE BLDC GLUCOMTR-MCNC: 192 MG/DL (ref 70–99)
GLUCOSE BLDC GLUCOMTR-MCNC: 90 MG/DL (ref 70–99)
GLUCOSE SERPL-MCNC: 177 MG/DL (ref 70–99)
HCT VFR BLD AUTO: 30.4 % (ref 35–48)
HGB BLD-MCNC: 10.2 G/DL (ref 12–16)
LYMPHOCYTES # BLD: 1 K/UL (ref 1–4)
LYMPHOCYTES NFR BLD: 14 %
MAGNESIUM SERPL-MCNC: 1.9 MG/DL (ref 1.8–2.5)
MCH RBC QN AUTO: 29.7 PG (ref 27–32)
MCHC RBC AUTO-ENTMCNC: 33.6 G/DL (ref 32–37)
MCV RBC AUTO: 88.4 FL (ref 80–100)
MONOCYTES # BLD: 0.6 K/UL (ref 0–1)
MONOCYTES NFR BLD: 8 %
NEUTROPHILS # BLD AUTO: 5.5 K/UL (ref 1.8–7.7)
NEUTROPHILS NFR BLD: 75 %
OSMOLALITY UR CALC.SUM OF ELEC: 288 MOSM/KG (ref 275–295)
PLATELET # BLD AUTO: 272 K/UL (ref 140–400)
PMV BLD AUTO: 7.7 FL (ref 7.4–10.3)
POTASSIUM SERPL-SCNC: 4.2 MMOL/L (ref 3.3–5.1)
POTASSIUM SERPL-SCNC: 4.2 MMOL/L (ref 3.3–5.1)
RBC # BLD AUTO: 3.44 M/UL (ref 3.7–5.4)
SODIUM SERPL-SCNC: 136 MMOL/L (ref 136–144)
WBC # BLD AUTO: 7.3 K/UL (ref 4–11)

## 2017-02-19 PROCEDURE — 99232 SBSQ HOSP IP/OBS MODERATE 35: CPT | Performed by: HOSPITALIST

## 2017-02-19 PROCEDURE — 99232 SBSQ HOSP IP/OBS MODERATE 35: CPT | Performed by: INTERNAL MEDICINE

## 2017-02-19 NOTE — PROGRESS NOTES
[place] [facility]    Progress Note    Reg Muro Patient Status:  Inpatient    1980 MRN [id number]   Location [facility] 4W/SW/SE Attending Stanton Salinas, 1604 [facility] Day # 9 PCP Nestor Chambers MD       Subjective:   Adela Mckeon Intravenous Q6H PRN   acetaminophen (TYLENOL EXTRA STRENGTH) tab 1,000 mg 1,000 mg Oral Q8H   FLUoxetine HCl (PROZAC) cap 40 mg 40 mg Oral Daily   Atorvastatin Calcium (LIPITOR) tab 10 mg 10 mg Oral Nightly   HYDROmorphone HCl PF (DILAUDID) 1 MG/ML injecti vac in place   - continue norco q 4 hours scheduled and dilaudid for breakthrough pain. apprec pain consult   - Zosyn on hold due to rash. Vanco on hold due to elevated trough. Trough now 6.  Restart abx per ID        Morbid Obesity  - counselled on diet an

## 2017-02-19 NOTE — PHYSICAL THERAPY NOTE
PHYSICAL THERAPY TREATMENT NOTE - INPATIENT    Room Number: 431/431-A     Number of Visits to Meet Established Goals: 1    Presenting Problem: s/p radical debridement and drainage of infected RLE abscess    Problem List  Principal Problem:    Cellulitis o Assessment   Gait Assistance: Supervision  Distance (ft): 50'x2, 100'  Assistive Device: Rolling walker  Pattern: R Decreased stance time  Stoop/Curb Assistance: Minimum assistance  Comment : up/down curb step 2x with RW and min A    Skilled Therapy Provid

## 2017-02-19 NOTE — DISCHARGE PLANNING
MDO received indicating pt will need iv abx and wound vac. Referral previously made to [facility] infusion. SW called to [facility] weekend intake with this update. Update given to [facility].     SHERYL owens [id number]

## 2017-02-19 NOTE — PROGRESS NOTES
Attempted to see the patient, but she has gone for PT. Bg reviewed. Levemir dose increased. Will follow BG and continue to adjust insulin as needed.

## 2017-02-19 NOTE — PROGRESS NOTES
PICC Line troubleshooting- second dose of Alteplase infused on the ALICIA Single lumen PICC line, dressing changed and able to draw 4 ml of blood without difficulty and able to flush Saline.

## 2017-02-19 NOTE — PLAN OF CARE
DISCHARGE PLANNING    • Discharge to home or other facility with appropriate resources Progressing    PLAN IS HOME MONDAY    Diabetes/Glucose Control    • Glucose maintained within prescribed range Progressing    ACCUCHECKS AC/HS.  WILL CONTINUE TO MONITOR

## 2017-02-19 NOTE — PROGRESS NOTES
120 [facility] dosing service    Follow-up Pharmacokinetic Consult for Vancomycin Dosing     Rose Jurado is a 39year old female who is being treated for cellulitis. Patient is on day 7 of Vancomycin Currently on hold. Goal trough is 10-15 ug/mL. Specimen Information: Blood from Blood,peripheral      Anaerobic Culture [[id number]] Collected: 02/14/17 8170     Order Status: Completed Lab Status: Final result Updated: 02/17/17 5568     Specimen Information: Abscess from Leg, right       Anaerobic Cult to assist in her care.     Ortiz Hand, PharmD  2/18/2017  11:39 PM

## 2017-02-19 NOTE — PROGRESS NOTES
[facility]    Progress Note    Charmaynemayne Manis Patient Status:  Inpatient    1980 MRN [id number]   Location [facility] 4W/SW/SE Attending Dolly Guy, 1604 [facility] Day # 9 PCP Vita Valencia MD       Subjective:   Ralph Souza 33.6   RDW  12.4  12.4  12.5   WBC  6.4  9.4  7.3   PLT  258  278  272     No results found for: PT, INR    Recent Labs   Lab  02/17/17   0600  02/18/17   0853  02/19/17   0449   GLU  202*  185*  177*   BUN  10  13  17   CREATSERUM  1.57*  1.67*  1.83*   G

## 2017-02-19 NOTE — PROGRESS NOTES
[facility]    Progress Note    Peyman Greene Patient Status:  Inpatient    1980 MRN [id number]   Location [facility] 4W/SW/SE Attending Sallee Dance, 1604 [facility] Day # 8 PCP Verito Cortes MD       Subjective:   Mariano Franklin rashes present, no abnormal bruising noted  Back/Spine: no abnormalities noted  Musculoskeletal: full ROM all extremities good strength  no deformities  Extremities: 2+ leg edema.  Still some redness R leg  Neurological:  Grossly normal    Results:     Labo

## 2017-02-20 LAB
ANION GAP SERPL CALC-SCNC: 5 MMOL/L (ref 0–18)
BASOPHILS # BLD: 0 K/UL (ref 0–0.2)
BASOPHILS NFR BLD: 1 %
BUN SERPL-MCNC: 16 MG/DL (ref 8–20)
BUN/CREAT SERPL: 10.1 (ref 10–20)
CALCIUM SERPL-MCNC: 8.2 MG/DL (ref 8.5–10.5)
CHLORIDE SERPL-SCNC: 106 MMOL/L (ref 95–110)
CO2 SERPL-SCNC: 24 MMOL/L (ref 22–32)
CREAT SERPL-MCNC: 1.58 MG/DL (ref 0.5–1.5)
EOSINOPHIL # BLD: 0.2 K/UL (ref 0–0.7)
EOSINOPHIL NFR BLD: 3 %
ERYTHROCYTE [DISTWIDTH] IN BLOOD BY AUTOMATED COUNT: 12.7 % (ref 11–15)
GLUCOSE BLDC GLUCOMTR-MCNC: 125 MG/DL (ref 70–99)
GLUCOSE BLDC GLUCOMTR-MCNC: 128 MG/DL (ref 70–99)
GLUCOSE BLDC GLUCOMTR-MCNC: 131 MG/DL (ref 70–99)
GLUCOSE BLDC GLUCOMTR-MCNC: 155 MG/DL (ref 70–99)
GLUCOSE SERPL-MCNC: 159 MG/DL (ref 70–99)
HCT VFR BLD AUTO: 30.4 % (ref 35–48)
HGB BLD-MCNC: 10.3 G/DL (ref 12–16)
LYMPHOCYTES # BLD: 0.7 K/UL (ref 1–4)
LYMPHOCYTES NFR BLD: 11 %
MCH RBC QN AUTO: 29.6 PG (ref 27–32)
MCHC RBC AUTO-ENTMCNC: 33.8 G/DL (ref 32–37)
MCV RBC AUTO: 87.4 FL (ref 80–100)
MONOCYTES # BLD: 0.6 K/UL (ref 0–1)
MONOCYTES NFR BLD: 10 %
NEUTROPHILS # BLD AUTO: 5 K/UL (ref 1.8–7.7)
NEUTROPHILS NFR BLD: 76 %
OSMOLALITY UR CALC.SUM OF ELEC: 285 MOSM/KG (ref 275–295)
PLATELET # BLD AUTO: 248 K/UL (ref 140–400)
PMV BLD AUTO: 7.5 FL (ref 7.4–10.3)
POTASSIUM SERPL-SCNC: 3.8 MMOL/L (ref 3.3–5.1)
RBC # BLD AUTO: 3.48 M/UL (ref 3.7–5.4)
SODIUM SERPL-SCNC: 135 MMOL/L (ref 136–144)
WBC # BLD AUTO: 6.6 K/UL (ref 4–11)

## 2017-02-20 PROCEDURE — 99233 SBSQ HOSP IP/OBS HIGH 50: CPT | Performed by: HOSPITALIST

## 2017-02-20 PROCEDURE — 99232 SBSQ HOSP IP/OBS MODERATE 35: CPT | Performed by: INTERNAL MEDICINE

## 2017-02-20 NOTE — PLAN OF CARE
DISCHARGE PLANNING    • Discharge to home or other facility with appropriate resources Progressing      Plan is to D/C home with ref made for [facility] infusion, likely Tuesday 2/21      Diabetes/Glucose Control    • Glucose maintained within prescrib

## 2017-02-20 NOTE — WOUND PROGRESS NOTE
Wound Care Services  Routine vac dressing change to the pt's right leg wounds, the vac dressings are off, as Dr Anne-Marie Brewster assessed the wounds this am. The pt's nurse is at the bedside and she medicated the pt for pain.  Dr Anne-Marie Brewster removed the penrose drai

## 2017-02-20 NOTE — PHYSICAL THERAPY NOTE
PHYSICAL THERAPY TREATMENT NOTE - INPATIENT    Room Number: 431/431-A     Number of Visits to Meet Established Goals: 1    Presenting Problem: s/p radical debridement and drainage of infected RLE abscess    Problem List  Principal Problem:    Cellulitis o Assessment   Gait Assistance: Supervision  Distance (ft): 100' and 50'  Assistive Device: None  Pattern: R Decreased stance time;R Steppage  Stoop/Curb Assistance: Minimum assistance  Comment : platform step with RW x 2    Skilled Therapy Provided: Patient

## 2017-02-20 NOTE — PROGRESS NOTES
[place] [facility] - [facility]    Progress Note      Subjective:     Patient with no new complaints. Rash seem to have spread a bit -  Less itching with benadryl    + temperature of 101.3 F .  Receiving vancomycin regularly and dose has been adjusted given hi normal  Psychiatric: calm    Medications:    Current Facility-Administered Medications:  [START ON 2/21/2017] insulin detemir (LEVEMIR) 100 UNIT/ML flextouch 42 Units 42 Units Subcutaneous Daily   DAPTOmycin (CUBICIN) 375 mg in Sterile Water for Injection 136  136  135*   K  4.2  4.2  4.2  3.8   CL  102  104  106   CO2  26  25  24       PTT   Date Value Ref Range Status   02/07/2017 26.9 23.2-35.3 seconds Final   ----------  No results for input(s): BNP in the last 72 hours.   Recent Labs   Lab  02/18/17   0

## 2017-02-20 NOTE — PROGRESS NOTES
INFECTIOUS DISEASE PROGRESS NOTE    [facility] Patient Status:  Inpatient    1980 MRN [id number]   Location [facility] 4W/SW/SE Attending Bill Gillette, [address] [facility] Day # 10 PCP Guillermo Zuleta MD     Subjective:  Patient seen and e changed to clindamycin.  Developed fever to 101 at home came to ED for further evaluation.  Doppler of the lower extremity without DVT.  Admission temperature 100.1 with WBC of 10.2.  Started on vancomycin and Zosyn.  Blood cultures no growth to date.  Ult

## 2017-02-20 NOTE — DISCHARGE PLANNING
IBRAHIMA was notified of the discharge plan. Waiting on wound vac approval from insurance, notified  to follow up. IBRAHIMA notified [facility] of MD orders for follow up. IV abx are pending per ID.   Plan is to discharge Tuesday or Wednes

## 2017-02-20 NOTE — PROGRESS NOTES
[place] [facility]    Progress Note    Danielle Nieto Patient Status:  Inpatient    1980 MRN [id number]   Location [facility] 4W/SW/SE Attending Jenny Sanchez, [address] [facility] Day # 9 PCP Chemo Bhagat MD       Subjective:   Ion Poole abnormalities noted  Musculoskeletal: full ROM all extremities good strength  no deformities  Extremities: no edema, cyanosis  Neurological:  Grossly normal    Results:     Laboratory Data:    Lab Results  Component Value Date   WBC 7.3 02/19/2017   HGB 10

## 2017-02-20 NOTE — PROGRESS NOTES
[facility]    [facility] Note    Aldo Gamez Patient Status:  Inpatient    1980 MRN [id number]   Location [facility] 4W/SW/SE Attending Shamar Orta, [address] [facility] Day # 10 PCP Lisa Lewis MD       Subjective:   Yue [facility]) injection 8 mg 8 mg Intravenous Q6H PRN   acetaminophen (TYLENOL EXTRA STRENGTH) tab 1,000 mg 1,000 mg Oral Q8H   FLUoxetine HCl (PROZAC) cap 40 mg 40 mg Oral Daily   Atorvastatin Calcium (LIPITOR) tab 10 mg 10 mg Oral Nightly   HYDROmorphone HCl norco q 4 hours scheduled and dilaudid for breakthrough pain. apprec pain consult   - Zosyn on hold due to rash. Now holding vanco due to rash.  Started on dapto today       Morbid Obesity  - counselled on diet and exercise      DM2  accuchecks AC and HS

## 2017-02-20 NOTE — CM/SW NOTE
CTL/Rounds: Received call from Regis Pierce stating that [address] did not have insurance approval. Spoke with Darylene Deck who called [employer] and gave authorization. [address] wound care updated. [address] stating that patient would possibly discharge 2/21.      Bony STONER [id number]

## 2017-02-20 NOTE — PROGRESS NOTES
[place] [facility] - [facility]    Progress Note    [facility] Patient Status:  Inpatient    1980 MRN [id number]   Location [facility] 4W/SW/SE Attending Joe Lynn, [address] [facility] Day # 10 PCP Devantegerri Mayberry MD       Subjective:   Yue MCV  88.8  88.4  87.4   MCH  29.9  29.7  29.6   MCHC  33.7  33.6  33.8   RDW  12.4  12.5  12.7   WBC  9.4  7.3  6.6   PLT  278  272  248     No results found for: PT, INR    Recent Labs   Lab  02/18/17   0853  02/19/17   0449  02/20/17   0558   GLU  185*

## 2017-02-20 NOTE — PROGRESS NOTES
[place] [facility] - [facility]  Progress Note      Peyman Greene Patient Status:  Inpatient    1980 MRN [id number]   Location [facility] 4W/SW/SE Attending Sallee Dance, 1604 [facility] Day # 10 PCP Verito Cortes MD       Subjective:   Pt r

## 2017-02-21 LAB
ANION GAP SERPL CALC-SCNC: 7 MMOL/L (ref 0–18)
BASOPHILS # BLD: 0 K/UL (ref 0–0.2)
BASOPHILS NFR BLD: 1 %
BUN SERPL-MCNC: 16 MG/DL (ref 8–20)
BUN/CREAT SERPL: 10.9 (ref 10–20)
CALCIUM SERPL-MCNC: 8.4 MG/DL (ref 8.5–10.5)
CHLORIDE SERPL-SCNC: 103 MMOL/L (ref 95–110)
CO2 SERPL-SCNC: 25 MMOL/L (ref 22–32)
CREAT SERPL-MCNC: 1.47 MG/DL (ref 0.5–1.5)
EOSINOPHIL # BLD: 0.2 K/UL (ref 0–0.7)
EOSINOPHIL NFR BLD: 6 %
ERYTHROCYTE [DISTWIDTH] IN BLOOD BY AUTOMATED COUNT: 12.4 % (ref 11–15)
GLUCOSE BLDC GLUCOMTR-MCNC: 102 MG/DL (ref 70–99)
GLUCOSE BLDC GLUCOMTR-MCNC: 112 MG/DL (ref 70–99)
GLUCOSE BLDC GLUCOMTR-MCNC: 124 MG/DL (ref 70–99)
GLUCOSE BLDC GLUCOMTR-MCNC: 143 MG/DL (ref 70–99)
GLUCOSE SERPL-MCNC: 128 MG/DL (ref 70–99)
HCT VFR BLD AUTO: 28.3 % (ref 35–48)
HGB BLD-MCNC: 9.6 G/DL (ref 12–16)
LYMPHOCYTES # BLD: 1.1 K/UL (ref 1–4)
LYMPHOCYTES NFR BLD: 31 %
MCH RBC QN AUTO: 29.9 PG (ref 27–32)
MCHC RBC AUTO-ENTMCNC: 33.9 G/DL (ref 32–37)
MCV RBC AUTO: 88.1 FL (ref 80–100)
MONOCYTES # BLD: 0.6 K/UL (ref 0–1)
MONOCYTES NFR BLD: 16 %
NEUTROPHILS # BLD AUTO: 1.6 K/UL (ref 1.8–7.7)
NEUTROPHILS NFR BLD: 47 %
OSMOLALITY UR CALC.SUM OF ELEC: 283 MOSM/KG (ref 275–295)
PLATELET # BLD AUTO: 249 K/UL (ref 140–400)
PMV BLD AUTO: 7.8 FL (ref 7.4–10.3)
POTASSIUM SERPL-SCNC: 4.1 MMOL/L (ref 3.3–5.1)
RBC # BLD AUTO: 3.21 M/UL (ref 3.7–5.4)
SODIUM SERPL-SCNC: 135 MMOL/L (ref 136–144)
WBC # BLD AUTO: 3.5 K/UL (ref 4–11)

## 2017-02-21 PROCEDURE — 99232 SBSQ HOSP IP/OBS MODERATE 35: CPT | Performed by: INTERNAL MEDICINE

## 2017-02-21 PROCEDURE — 99232 SBSQ HOSP IP/OBS MODERATE 35: CPT | Performed by: HOSPITALIST

## 2017-02-21 RX ORDER — FLUCONAZOLE 150 MG/1
150 TABLET ORAL ONCE
Status: COMPLETED | OUTPATIENT
Start: 2017-02-21 | End: 2017-02-21

## 2017-02-21 RX ORDER — OXYCODONE AND ACETAMINOPHEN 10; 325 MG/1; MG/1
1 TABLET ORAL
Qty: 30 TABLET | Refills: 0 | Status: SHIPPED | OUTPATIENT
Start: 2017-02-21 | End: 2017-03-07

## 2017-02-21 RX ORDER — HYDROCODONE BITARTRATE AND ACETAMINOPHEN 7.5; 325 MG/1; MG/1
1-2 TABLET ORAL EVERY 6 HOURS PRN
Qty: 30 TABLET | Refills: 0 | Status: SHIPPED | OUTPATIENT
Start: 2017-02-21 | End: 2017-02-22

## 2017-02-21 RX ORDER — GABAPENTIN 300 MG/1
300 CAPSULE ORAL 3 TIMES DAILY
Qty: 90 CAPSULE | Refills: 0 | Status: SHIPPED | OUTPATIENT
Start: 2017-02-21 | End: 2017-02-22

## 2017-02-21 NOTE — PROGRESS NOTES
[facility]    Progress Note    Telma Lubin Patient Status:  Inpatient    1980 MRN [id number]   Location [facility] 4W/SW/SE Attending Jose Angel Tejada, [address] [facility] Day # 10 PCP Ketty Parra MD     Subjective:  Hodan Taylor

## 2017-02-21 NOTE — PROGRESS NOTES
[facility]  Progress Note      Dc Iverson Patient Status:  Inpatient    1980 MRN [id number]   Location [facility] 4W/SW/SE Attending Hiram Chauhan, [address] [facility] Day # 6 PCP Zeyad Bernstein MD       Subjective:   Ms.

## 2017-02-21 NOTE — PHYSICAL THERAPY NOTE
PHYSICAL THERAPY TREATMENT NOTE - INPATIENT    Room Number: 431/431-A       Number of Visits to Meet Established Goals: 1    Presenting Problem: s/p radical debridement and drainage of infected RLE abscess    Problem List  Principal Problem:    Cellulitis Assessment   Gait Assistance: Supervision  Distance (ft): 125'  Assistive Device:  (IV pole)  Pattern: R Decreased stance time  Stoop/Curb Assistance: Minimum assistance  Comment :  (platform with RW)    Skilled Therapy Provided: bed mobility, sit<>stand t #5      Goal #6      Goal Comments: Goals established on 2/15/2017

## 2017-02-21 NOTE — DISCHARGE PLANNING
IBRAHIMA spoke with Dr. Inessa Ramirez regarding the pt. Plan is to switch to PO abx prior to discharge. IBRAHIMA notified [facility] of the change and plan is for discharge home tomorrow 2/22 with [facility] and a [employer] wound vac.     SHERYL Adam ext [phone]

## 2017-02-21 NOTE — PLAN OF CARE
Pt did well through the night getting some sleep and this nurse administered pain meds and benadryl - will continue to monitor for safety and pain.

## 2017-02-21 NOTE — PROGRESS NOTES
INFECTIOUS DISEASE PROGRESS NOTE    Reg Muro Patient Status:  Inpatient    1980 MRN [id number]   Location [facility] 4W/SW/SE Attending Stanton Salinas, [address] [facility] Day # 6 PCP Nestor Chambers MD     Subjective:  Patient seen and e clindamycin.  Developed fever to 101 at home came to ED for further evaluation.  Doppler of the lower extremity without DVT.  Admission temperature 100.1 with WBC of 10.2.  Started on vancomycin and Zosyn.  Blood cultures no growth to date.  Ultrasound 2/1

## 2017-02-21 NOTE — PROGRESS NOTES
[place] [facility]    Progress Note      Subjective:     Antibiotic changed to daptomycin yesterday. Sitting comfortably in chair .  Improved rash in back and less itching       Review of Systems:     Constitutional: negative for fatigue  Eyes: ne Facility-Administered Medications:  insulin detemir (LEVEMIR) 100 UNIT/ML flextouch 42 Units 42 Units Subcutaneous Daily   DAPTOmycin (CUBICIN) 375 mg in Sterile Water for Injection IV syringe 4 mg/kg (Adjusted) Intravenous Q24H   0.9%  NaCl infusion  Intr 25       PTT   Date Value Ref Range Status   02/07/2017 26.9 23.2-35.3 seconds Final   ----------  No results for input(s): BNP in the last 72 hours.   Recent Labs   Lab  02/19/17   0449   MG  1.9        No results for input(s): PHOS, ALB in the last 72 denia

## 2017-02-21 NOTE — PROGRESS NOTES
[place] [facility]    Progress Note    Jelaniedel Hughes Patient Status:  Inpatient    1980 MRN [id number]   Location [facility] 4W/SW/SE Attending Seven Escudero, [address] [facility] Day # 11 PCP Chelita Herring MD       Subjective:   Yue ondansetron HCl (ZOFRAN) injection 8 mg 8 mg Intravenous Q6H PRN   acetaminophen (TYLENOL EXTRA STRENGTH) tab 1,000 mg 1,000 mg Oral Q8H   FLUoxetine HCl (PROZAC) cap 40 mg 40 mg Oral Daily   Atorvastatin Calcium (LIPITOR) tab 10 mg 10 mg Oral Nightly CO2  25  24  25             Assessment and Plan:     Cellulitis  And subcutaneous abscess of right leg. Fever overnight   - apprec surg consult - status post drainage of 17 ml of purulent/hemorrhagic fluid  - s/p debridement   - Picc placed.  ID consulted

## 2017-02-21 NOTE — WOUND PROGRESS NOTE
Wound care services  Follow up on the pt's right lower leg vac dressings, vac with a seal and vac suction is on at 125 mmhg cont suction. , Next vac dressing change is tomorrow. Home vac was approved and is in the pt's room, She is asleep at this time.  Spok

## 2017-02-21 NOTE — PROGRESS NOTES
[facility]    Progress Note    Dc Zayra Patient Status:  Inpatient    1980 MRN [id number]   Location [facility] 4W/SW/SE Attending Hiram Chauhan, [address] [facility] Day # 11 PCP Zeyad Berntsein MD       Subjective:   Yue INR    Recent Labs   Lab  02/19/17   0449  02/20/17   0558  02/21/17   0559   GLU  177*  159*  128*   BUN  17  16  16   CREATSERUM  1.83*  1.58*  1.47   GFRAA  38*  45*  49*   GFRNAA  31*  37*  40*   CA  8.4*  8.2*  8.4*   NA  136  135*  135*   K  4.2  4.2

## 2017-02-22 ENCOUNTER — PATIENT MESSAGE (OUTPATIENT)
Dept: INTERNAL MEDICINE CLINIC | Facility: CLINIC | Age: 37
End: 2017-02-22

## 2017-02-22 VITALS
TEMPERATURE: 99 F | SYSTOLIC BLOOD PRESSURE: 109 MMHG | BODY MASS INDEX: 45.99 KG/M2 | WEIGHT: 293 LBS | OXYGEN SATURATION: 96 % | RESPIRATION RATE: 18 BRPM | HEIGHT: 67 IN | HEART RATE: 75 BPM | DIASTOLIC BLOOD PRESSURE: 59 MMHG

## 2017-02-22 LAB
ANION GAP SERPL CALC-SCNC: 8 MMOL/L (ref 0–18)
BASOPHILS # BLD: 0 K/UL (ref 0–0.2)
BASOPHILS NFR BLD: 1 %
BUN SERPL-MCNC: 18 MG/DL (ref 8–20)
BUN/CREAT SERPL: 12.4 (ref 10–20)
CALCIUM SERPL-MCNC: 8.5 MG/DL (ref 8.5–10.5)
CHLORIDE SERPL-SCNC: 103 MMOL/L (ref 95–110)
CO2 SERPL-SCNC: 25 MMOL/L (ref 22–32)
CREAT SERPL-MCNC: 1.45 MG/DL (ref 0.5–1.5)
EOSINOPHIL # BLD: 0.2 K/UL (ref 0–0.7)
EOSINOPHIL NFR BLD: 6 %
ERYTHROCYTE [DISTWIDTH] IN BLOOD BY AUTOMATED COUNT: 12.6 % (ref 11–15)
GLUCOSE BLDC GLUCOMTR-MCNC: 141 MG/DL (ref 70–99)
GLUCOSE BLDC GLUCOMTR-MCNC: 161 MG/DL (ref 70–99)
GLUCOSE SERPL-MCNC: 138 MG/DL (ref 70–99)
HCT VFR BLD AUTO: 30.1 % (ref 35–48)
HGB BLD-MCNC: 10.3 G/DL (ref 12–16)
LYMPHOCYTES # BLD: 1.3 K/UL (ref 1–4)
LYMPHOCYTES NFR BLD: 37 %
MCH RBC QN AUTO: 30 PG (ref 27–32)
MCHC RBC AUTO-ENTMCNC: 34.4 G/DL (ref 32–37)
MCV RBC AUTO: 87.4 FL (ref 80–100)
MONOCYTES # BLD: 0.6 K/UL (ref 0–1)
MONOCYTES NFR BLD: 17 %
NEUTROPHILS # BLD AUTO: 1.5 K/UL (ref 1.8–7.7)
NEUTROPHILS NFR BLD: 40 %
OSMOLALITY UR CALC.SUM OF ELEC: 286 MOSM/KG (ref 275–295)
PLATELET # BLD AUTO: 291 K/UL (ref 140–400)
PMV BLD AUTO: 7.7 FL (ref 7.4–10.3)
POTASSIUM SERPL-SCNC: 4.4 MMOL/L (ref 3.3–5.1)
RBC # BLD AUTO: 3.44 M/UL (ref 3.7–5.4)
SODIUM SERPL-SCNC: 136 MMOL/L (ref 136–144)
WBC # BLD AUTO: 3.6 K/UL (ref 4–11)

## 2017-02-22 PROCEDURE — 99239 HOSP IP/OBS DSCHRG MGMT >30: CPT | Performed by: HOSPITALIST

## 2017-02-22 PROCEDURE — 99232 SBSQ HOSP IP/OBS MODERATE 35: CPT | Performed by: INTERNAL MEDICINE

## 2017-02-22 RX ORDER — CLINDAMYCIN HYDROCHLORIDE 300 MG/1
300 CAPSULE ORAL 4 TIMES DAILY
Qty: 40 CAPSULE | Refills: 0 | Status: SHIPPED | OUTPATIENT
Start: 2017-02-22 | End: 2017-03-07 | Stop reason: ALTCHOICE

## 2017-02-22 RX ORDER — FLUOXETINE HYDROCHLORIDE 40 MG/1
40 CAPSULE ORAL DAILY
Qty: 30 CAPSULE | Refills: 0 | Status: SHIPPED | OUTPATIENT
Start: 2017-02-22 | End: 2017-03-28

## 2017-02-22 RX ORDER — DOXYCYCLINE HYCLATE 100 MG/1
100 TABLET, DELAYED RELEASE ORAL 2 TIMES DAILY
Qty: 20 TABLET | Refills: 0 | Status: SHIPPED | OUTPATIENT
Start: 2017-02-22 | End: 2017-03-03 | Stop reason: ALTCHOICE

## 2017-02-22 RX ORDER — GABAPENTIN 300 MG/1
300 CAPSULE ORAL 3 TIMES DAILY
Qty: 90 CAPSULE | Refills: 0 | Status: SHIPPED | OUTPATIENT
Start: 2017-02-22 | End: 2017-04-19

## 2017-02-22 NOTE — TELEPHONE ENCOUNTER
Patient is in [address] [place] [address] at this time and should be released sometime today patient states she is completely out of medication and will call the office when she gets home to schedule an appointment

## 2017-02-22 NOTE — PROGRESS NOTES
[facility]    Progress Note    Arlette Zhao Patient Status:  Inpatient    1980 MRN [id number]   Location [facility] 4W/SW/SE Attending Evelina Moyer, [address] [facility] Day # 12 PCP Dino Laird MD     Subjective:  Shante Speaker

## 2017-02-22 NOTE — PLAN OF CARE
DISCHARGE PLANNING    • Discharge to home or other facility with appropriate resources Adequate for Discharge        Diabetes/Glucose Control    • Glucose maintained within prescribed range Adequate for Discharge        HEMATOLOGIC - ADULT    • Maintains h

## 2017-02-22 NOTE — PROGRESS NOTES
[place] [facility] - [facility]    Progress Note    Renell Quivers Patient Status:  Inpatient    1980 MRN [id number]   Location [facility] 4W/SW/SE Attending Ko Giraldo, [address] [facility] Day # 12 PCP Joshua Lopez MD       Subjective:   Yue subcutaneous abscess drain within right lower extremity. Drain is to remain attached to LEONARDO bulb with output recorded every 8 hours. 2. Infectious disease (Dr. Lv Fernández) and General surgery (Dr. Lola Irene) consultation has been requested.                   Cee Gibson

## 2017-02-22 NOTE — PHYSICAL THERAPY NOTE
PHYSICAL THERAPY TREATMENT NOTE - INPATIENT    Room Number: 431/431-A       Number of Visits to Meet Established Goals: 1    Presenting Problem: s/p radical debridement and drainage of infected RLE abscess    Problem List  Principal Problem:    Cellulitis Supervision  Distance (ft): 150  Assistive Device: None  Pattern: R Decreased stance time  Stoop/Curb Assistance: Minimum assistance  Comment : platform step with     Skilled Therapy Provided: bed mobility, sit<>stand transfer, HEP, pt/family education, st

## 2017-02-22 NOTE — WOUND PROGRESS NOTE
WOUND CARE NOTE      PLAN   Recommendations:  Dr. Vladimir Estevez currently on consult  Dietary on consult for recommendations for nutrition to optimize wound healing  Diabetic Education is following for optimal glucose control  VAC standing orders  VAC troubl

## 2017-02-22 NOTE — TELEPHONE ENCOUNTER
Upon review of chart, last office visit was 06/02/2016, and not future appointment scheduled at this time. Due to patient not having appointment in office protocol timeframe, I am unable to fill medication. Please schedule appointment for patient.

## 2017-02-23 ENCOUNTER — TELEPHONE (OUTPATIENT)
Dept: MEDSURG UNIT | Facility: HOSPITAL | Age: 37
End: 2017-02-23

## 2017-02-23 RX ORDER — PEN NEEDLE, DIABETIC 31 GX5/16"
NEEDLE, DISPOSABLE MISCELLANEOUS
Qty: 200 EACH | Refills: 6 | Status: SHIPPED | OUTPATIENT
Start: 2017-02-23 | End: 2019-07-02 | Stop reason: ALTCHOICE

## 2017-02-23 NOTE — TELEPHONE ENCOUNTER
From: Shelly Jeong  To: Len Cruz MD  Sent: 2/22/2017 8:18 PM CST  Subject: Prescription Question    Thank you for renewing the Prozac and the cholesterol drug.  I'm home from hospital, but the hospitalist forgot to prescribe the needles for t

## 2017-02-24 ENCOUNTER — TELEPHONE (OUTPATIENT)
Dept: INTERNAL MEDICINE CLINIC | Facility: CLINIC | Age: 37
End: 2017-02-24

## 2017-02-24 NOTE — DISCHARGE SUMMARY
[facility]    PATIENT'S NAME: YVETTEBUZZSUZYJERONIMO BENSON   ATTENDING PHYSICIAN: Noah Julio MD   PATIENT ACCOUNT#:   [id number]    LOCATION:  [address] [place] [address] #:   [id number]       YOB: 1980  ADMISSION DATE:       02/10 she improved and was able to be discharged home with a wound VAC when the wound VAC was set up. PHYSICAL EXAMINATION:    VITAL SIGNS:  Upon discharge, temperature is 98.6, pulse 75, respiratory rate 18, 109/59, 96%. Weight is 305 pounds 6 ounces.

## 2017-02-24 NOTE — TELEPHONE ENCOUNTER
She needs to contact the infectious disease doctor who was following her and see if she should adjust her antibiotics. Meanwhile, should continue benadryl 4 times daily and use topical moisturizer to see if this will help the itching.

## 2017-02-24 NOTE — TELEPHONE ENCOUNTER
Message has been left on Ale's voicemail with Md's advise and instructions and to call back with any questions.

## 2017-02-25 ENCOUNTER — TELEPHONE (OUTPATIENT)
Dept: INTERNAL MEDICINE CLINIC | Facility: CLINIC | Age: 37
End: 2017-02-25

## 2017-02-25 NOTE — TELEPHONE ENCOUNTER
Patient is noticing she her cellulitis of her leg is spreading and is getting bright red .  She was just released from [place]  Please advice

## 2017-02-25 NOTE — TELEPHONE ENCOUNTER
Pt informed that she needs to contact infectious disease specialist for advice, and if cannot get an answer pt will need to go back to the ER. Pt understands instructions.

## 2017-02-25 NOTE — TELEPHONE ENCOUNTER
She needs to contact the infectious disease specialist for advice, they are prescribing the antibiotics, if she can't get an answer, will need to go back to ER

## 2017-02-27 ENCOUNTER — TELEPHONE (OUTPATIENT)
Dept: INTERNAL MEDICINE CLINIC | Facility: CLINIC | Age: 37
End: 2017-02-27

## 2017-02-27 RX ORDER — LANCETS
EACH MISCELLANEOUS
Qty: 1 BOX | Refills: 3 | Status: SHIPPED | OUTPATIENT
Start: 2017-02-27 | End: 2017-02-27

## 2017-02-27 RX ORDER — LANCETS
EACH MISCELLANEOUS
Qty: 1 BOX | Refills: 3 | Status: SHIPPED | OUTPATIENT
Start: 2017-02-27 | End: 2017-05-08

## 2017-03-03 ENCOUNTER — LAB ENCOUNTER (OUTPATIENT)
Dept: LAB | Facility: HOSPITAL | Age: 37
End: 2017-03-03
Attending: SURGERY
Payer: COMMERCIAL

## 2017-03-03 DIAGNOSIS — E11.8 UNCONTROLLED TYPE 2 DIABETES MELLITUS WITH COMPLICATION, WITH LONG-TERM CURRENT USE OF INSULIN (HCC): ICD-10-CM

## 2017-03-03 DIAGNOSIS — L02.415 ABSCESS OF RIGHT LEG: ICD-10-CM

## 2017-03-03 DIAGNOSIS — Z79.4 UNCONTROLLED TYPE 2 DIABETES MELLITUS WITH COMPLICATION, WITH LONG-TERM CURRENT USE OF INSULIN (HCC): ICD-10-CM

## 2017-03-03 DIAGNOSIS — E11.65 UNCONTROLLED TYPE 2 DIABETES MELLITUS WITH COMPLICATION, WITH LONG-TERM CURRENT USE OF INSULIN (HCC): ICD-10-CM

## 2017-03-03 PROBLEM — IMO0002 UNCONTROLLED TYPE 2 DIABETES MELLITUS WITH COMPLICATION, WITH LONG-TERM CURRENT USE OF INSULIN: Status: ACTIVE | Noted: 2017-03-03

## 2017-03-03 LAB
ANION GAP SERPL CALC-SCNC: 9 MMOL/L (ref 0–18)
BASOPHILS # BLD: 0.1 K/UL (ref 0–0.2)
BASOPHILS NFR BLD: 1 %
BUN SERPL-MCNC: 17 MG/DL (ref 8–20)
BUN/CREAT SERPL: 12.8 (ref 10–20)
CALCIUM SERPL-MCNC: 8.9 MG/DL (ref 8.5–10.5)
CHLORIDE SERPL-SCNC: 107 MMOL/L (ref 95–110)
CO2 SERPL-SCNC: 21 MMOL/L (ref 22–32)
CREAT SERPL-MCNC: 1.33 MG/DL (ref 0.5–1.5)
EOSINOPHIL # BLD: 1.1 K/UL (ref 0–0.7)
EOSINOPHIL NFR BLD: 12 %
ERYTHROCYTE [DISTWIDTH] IN BLOOD BY AUTOMATED COUNT: 13.5 % (ref 11–15)
GLUCOSE SERPL-MCNC: 118 MG/DL (ref 70–99)
HCT VFR BLD AUTO: 34.8 % (ref 35–48)
HGB BLD-MCNC: 11.8 G/DL (ref 12–16)
LYMPHOCYTES # BLD: 1.7 K/UL (ref 1–4)
LYMPHOCYTES NFR BLD: 17 %
MCH RBC QN AUTO: 29.6 PG (ref 27–32)
MCHC RBC AUTO-ENTMCNC: 33.9 G/DL (ref 32–37)
MCV RBC AUTO: 87.3 FL (ref 80–100)
MONOCYTES # BLD: 0.6 K/UL (ref 0–1)
MONOCYTES NFR BLD: 6 %
NEUTROPHILS # BLD AUTO: 6.2 K/UL (ref 1.8–7.7)
NEUTROPHILS NFR BLD: 64 %
OSMOLALITY UR CALC.SUM OF ELEC: 287 MOSM/KG (ref 275–295)
PLATELET # BLD AUTO: 333 K/UL (ref 140–400)
PMV BLD AUTO: 7.2 FL (ref 7.4–10.3)
POTASSIUM SERPL-SCNC: 4 MMOL/L (ref 3.3–5.1)
RBC # BLD AUTO: 3.99 M/UL (ref 3.7–5.4)
SODIUM SERPL-SCNC: 137 MMOL/L (ref 136–144)
WBC # BLD AUTO: 9.8 K/UL (ref 4–11)

## 2017-03-03 PROCEDURE — 85060 BLOOD SMEAR INTERPRETATION: CPT

## 2017-03-03 PROCEDURE — 80048 BASIC METABOLIC PNL TOTAL CA: CPT

## 2017-03-03 PROCEDURE — 36415 COLL VENOUS BLD VENIPUNCTURE: CPT

## 2017-03-03 PROCEDURE — 85025 COMPLETE CBC W/AUTO DIFF WBC: CPT

## 2017-03-07 ENCOUNTER — OFFICE VISIT (OUTPATIENT)
Dept: INTERNAL MEDICINE CLINIC | Facility: CLINIC | Age: 37
End: 2017-03-07

## 2017-03-07 VITALS
DIASTOLIC BLOOD PRESSURE: 91 MMHG | BODY MASS INDEX: 45.99 KG/M2 | WEIGHT: 293 LBS | RESPIRATION RATE: 18 BRPM | HEART RATE: 93 BPM | OXYGEN SATURATION: 99 % | SYSTOLIC BLOOD PRESSURE: 145 MMHG | TEMPERATURE: 99 F | HEIGHT: 67 IN

## 2017-03-07 DIAGNOSIS — E11.8 UNCONTROLLED TYPE 2 DIABETES MELLITUS WITH COMPLICATION, WITH LONG-TERM CURRENT USE OF INSULIN (HCC): ICD-10-CM

## 2017-03-07 DIAGNOSIS — Z79.4 UNCONTROLLED TYPE 2 DIABETES MELLITUS WITH COMPLICATION, WITH LONG-TERM CURRENT USE OF INSULIN (HCC): ICD-10-CM

## 2017-03-07 DIAGNOSIS — E11.65 UNCONTROLLED TYPE 2 DIABETES MELLITUS WITH COMPLICATION, WITH LONG-TERM CURRENT USE OF INSULIN (HCC): ICD-10-CM

## 2017-03-07 DIAGNOSIS — L03.115 CELLULITIS OF RIGHT LEG: Primary | ICD-10-CM

## 2017-03-07 PROBLEM — E87.1 HYPONATREMIA: Status: RESOLVED | Noted: 2017-02-11 | Resolved: 2017-03-07

## 2017-03-07 PROCEDURE — 99213 OFFICE O/P EST LOW 20 MIN: CPT | Performed by: INTERNAL MEDICINE

## 2017-03-07 PROCEDURE — 99212 OFFICE O/P EST SF 10 MIN: CPT | Performed by: INTERNAL MEDICINE

## 2017-03-07 RX ORDER — OXYCODONE AND ACETAMINOPHEN 10; 325 MG/1; MG/1
1 TABLET ORAL EVERY 8 HOURS PRN
Qty: 50 TABLET | Refills: 0 | Status: SHIPPED | OUTPATIENT
Start: 2017-03-07 | End: 2017-05-08 | Stop reason: ALTCHOICE

## 2017-03-07 RX ORDER — PRAVASTATIN SODIUM 40 MG
40 TABLET ORAL NIGHTLY
Qty: 30 TABLET | Refills: 6 | Status: SHIPPED | OUTPATIENT
Start: 2017-03-07 | End: 2018-01-24

## 2017-03-07 NOTE — PROGRESS NOTES
HPI:    Patient ID: Pushpa Naranjo is a 39year old female. HPI  Cellulitis  Patient with celluliltis after vein procedure. Now seeing Dr Carroll Galo and has wound vac in place.      Diabetes  Patient on levemir 42 units daily and she is taking novolog 12  MG Oral Tab Take 1 tablet by mouth every 3 (three) hours as needed. Disp: 30 tablet Rfl: 0   insulin aspart 100 UNIT/ML Subcutaneous Solution Pen-injector Inject 1-7 Units into the skin 3 (three) times daily with meals.  Disp: 1 pen Rfl: 0   insulin

## 2017-03-07 NOTE — PATIENT INSTRUCTIONS
ASSESSMENT/PLAN:   Diagnoses and all orders for this visit:    Cellulitis of right leg  -     oxyCODONE-acetaminophen  MG Oral Tab; Take 1 tablet by mouth every 3 (three) hours as needed. Patient aware to use this sparingly.   Patient to continue wit

## 2017-03-15 ENCOUNTER — TELEPHONE (OUTPATIENT)
Dept: ENDOCRINOLOGY CLINIC | Facility: CLINIC | Age: 37
End: 2017-03-15

## 2017-03-15 NOTE — TELEPHONE ENCOUNTER
Pt saw  in [address] [place] [address] - was released on 2/23- needs f/u appt 4 weeks after -

## 2017-03-15 NOTE — TELEPHONE ENCOUNTER
Does look like DERICK consulted patient in hospital in February. Currently no MD approvals in March or until middle of April. Should I add patient in an additional morning slot like 7 or 8:45 or double book?  Alternatively there is one spot on hold Friday lula

## 2017-03-17 ENCOUNTER — HOSPITAL ENCOUNTER (OUTPATIENT)
Dept: ULTRASOUND IMAGING | Facility: HOSPITAL | Age: 37
Discharge: HOME | End: 2017-03-17
Attending: RADIOLOGY
Payer: COMMERCIAL

## 2017-03-17 ENCOUNTER — TELEPHONE (OUTPATIENT)
Dept: INTERNAL MEDICINE CLINIC | Facility: CLINIC | Age: 37
End: 2017-03-17

## 2017-03-17 DIAGNOSIS — Z98.890 S/P ABLATION OF ACCESSORY BYPASS TRACT: ICD-10-CM

## 2017-03-17 DIAGNOSIS — L02.415 ABSCESS OF RIGHT LEG: Primary | ICD-10-CM

## 2017-03-20 ENCOUNTER — TELEPHONE (OUTPATIENT)
Dept: INTERNAL MEDICINE CLINIC | Facility: CLINIC | Age: 37
End: 2017-03-20

## 2017-03-20 ENCOUNTER — OFFICE VISIT (OUTPATIENT)
Dept: ENDOCRINOLOGY CLINIC | Facility: CLINIC | Age: 37
End: 2017-03-20

## 2017-03-20 VITALS
SYSTOLIC BLOOD PRESSURE: 122 MMHG | HEIGHT: 67 IN | DIASTOLIC BLOOD PRESSURE: 78 MMHG | HEART RATE: 80 BPM | BODY MASS INDEX: 45.99 KG/M2 | WEIGHT: 293 LBS

## 2017-03-20 DIAGNOSIS — IMO0001 UNCONTROLLED DIABETES MELLITUS TYPE 2 WITHOUT COMPLICATIONS, UNSPECIFIED LONG TERM INSULIN USE STATUS: Primary | ICD-10-CM

## 2017-03-20 LAB
GLUCOSE BLOOD: 119
TEST STRIP LOT #: NORMAL NUMERIC

## 2017-03-20 PROCEDURE — 36416 COLLJ CAPILLARY BLOOD SPEC: CPT | Performed by: INTERNAL MEDICINE

## 2017-03-20 PROCEDURE — 99213 OFFICE O/P EST LOW 20 MIN: CPT | Performed by: INTERNAL MEDICINE

## 2017-03-20 PROCEDURE — 82962 GLUCOSE BLOOD TEST: CPT | Performed by: INTERNAL MEDICINE

## 2017-03-20 NOTE — TELEPHONE ENCOUNTER
Received call from patient Calderon Grey, who states that they need to D/C patient from home health today. Patient does have a neg pressure wound vac on her leg. However, patient has been out of the house, shopping, and getting her nails done.   So they have

## 2017-03-20 NOTE — PROGRESS NOTES
Name: Maria Queen  Date: 3/20/2017    Referring Physician: No ref. provider found    HISTORY OF PRESENT ILLNESS   Maria Queen is a 39year old female who presents for diabetes mellitus, diagnosed over 10 years ago.   She was recently evaluated in th (three) times daily. , Disp: 90 capsule, Rfl: 0  •  insulin aspart 100 UNIT/ML Subcutaneous Solution Pen-injector, Inject 1-7 Units into the skin 3 (three) times daily with meals. , Disp: 1 pen, Rfl: 0  •  insulin detemir 100 UNIT/ML Subcutaneous Solution Pe Nails:  normal scalp hair     Hematologic:  no excessive bruising  Neuro:  sensory grossly intact and motor grossly intact  Psychiatric:  oriented to time, self, and place  Nutritional:  no abnormal weight gain or loss    ASSESSMENT/PLAN:      1.  Diabetes

## 2017-03-20 NOTE — TELEPHONE ENCOUNTER
Dr Christina Rodriguez runs the wound clinic at [address] [place] [address], he should write the vac orders

## 2017-03-23 ENCOUNTER — OFFICE VISIT (OUTPATIENT)
Dept: WOUND CARE | Facility: HOSPITAL | Age: 37
End: 2017-03-23
Attending: NURSE PRACTITIONER
Payer: COMMERCIAL

## 2017-03-23 DIAGNOSIS — L02.415 ABSCESS OF RIGHT LEG: Primary | ICD-10-CM

## 2017-03-23 DIAGNOSIS — E11.65 UNCONTROLLED TYPE 2 DIABETES MELLITUS WITH COMPLICATION, WITH LONG-TERM CURRENT USE OF INSULIN (HCC): ICD-10-CM

## 2017-03-23 DIAGNOSIS — E11.8 UNCONTROLLED TYPE 2 DIABETES MELLITUS WITH COMPLICATION, WITH LONG-TERM CURRENT USE OF INSULIN (HCC): ICD-10-CM

## 2017-03-23 DIAGNOSIS — Z79.4 UNCONTROLLED TYPE 2 DIABETES MELLITUS WITH COMPLICATION, WITH LONG-TERM CURRENT USE OF INSULIN (HCC): ICD-10-CM

## 2017-03-23 PROCEDURE — 97162 PT EVAL MOD COMPLEX 30 MIN: CPT

## 2017-03-23 PROCEDURE — 97605 NEG PRS WND THER DME<=50SQCM: CPT

## 2017-03-23 PROCEDURE — 29581 APPL MULTLAYER CMPRN SYS LEG: CPT

## 2017-03-23 NOTE — PROGRESS NOTES
Subjective    Chief Complaint  This information was obtained from the patient  The patient is new to the [address] [facility] here for an initial visit for the evaluation and management of non-healing wound(s).     Allergies  clindamycin, doxycycline, sulfa antybio ·   Fragments of focally reactive-appearing, edematous skin and underlying subcutaneous soft tissue demonstrating focally prominent severe necrotic acute inflammatory infiltrates/exudates predominantly concentrated within the subcutaneous fibroadipose tiss Never smoker, Occupation - , Lives with - alone, No Signs/Symptoms of Abuse, Suicide Risk: Patient denies suicidal ideation    Past Medical History  This information was obtained from the patient  Patient has a medical history of:  uncontrolled DM Wound #1 Medial Lower Leg is an acute Partial Thickness Surgical Wound and has received a status of Not Healed. Initial wound encounter measurements are 2.4cm length x 1.6cm width x 1.2cm depth, with an area of 3.84 sq cm and a volume of 4.608 cubic cm.  Un (Encounter Diagnosis) L02.415 - Cutaneous abscess of right lower limb    Diagnoses    ICD-10  E11.8: Type 2 diabetes mellitus with unspecified complications  Z92.43: Type 2 diabetes mellitus with hyperglycemia  Z79.4: Long term (current) use of insulin  N1 Mepilex lite under vac due to periwound irriation    Thank you for your referral. Please co-sign  this letter using the CO-SIGN button to certify the need for these services furnished under this plan of treatment and while under my care.      If you have an

## 2017-03-24 ENCOUNTER — TELEPHONE (OUTPATIENT)
Dept: CASE MANAGEMENT | Age: 37
End: 2017-03-24

## 2017-03-24 NOTE — TELEPHONE ENCOUNTER
Doctor, would you take issue with me placing a referral for authorization from an order you wrote to [facility] for some supplies? Let me know if you approve, I will get it started.

## 2017-03-27 ENCOUNTER — OFFICE VISIT (OUTPATIENT)
Dept: WOUND CARE | Facility: HOSPITAL | Age: 37
End: 2017-03-27
Attending: NURSE PRACTITIONER
Payer: COMMERCIAL

## 2017-03-27 DIAGNOSIS — L02.415 ABSCESS OF RIGHT LEG: Primary | ICD-10-CM

## 2017-03-27 DIAGNOSIS — Z79.4 UNCONTROLLED TYPE 2 DIABETES MELLITUS WITH COMPLICATION, WITH LONG-TERM CURRENT USE OF INSULIN (HCC): ICD-10-CM

## 2017-03-27 DIAGNOSIS — E11.8 UNCONTROLLED TYPE 2 DIABETES MELLITUS WITH COMPLICATION, WITH LONG-TERM CURRENT USE OF INSULIN (HCC): ICD-10-CM

## 2017-03-27 DIAGNOSIS — E11.65 UNCONTROLLED TYPE 2 DIABETES MELLITUS WITH COMPLICATION, WITH LONG-TERM CURRENT USE OF INSULIN (HCC): ICD-10-CM

## 2017-03-27 PROCEDURE — 97605 NEG PRS WND THER DME<=50SQCM: CPT

## 2017-03-27 PROCEDURE — 29581 APPL MULTLAYER CMPRN SYS LEG: CPT

## 2017-03-27 NOTE — PROGRESS NOTES
Subjective    Chief Complaint  This information was obtained from the patient  The patient was seen today for follow up and management of difficult to heal wound(s). 3/27/17: Patient states her right foot that was not wrapped on Thursday is swollen.      Al surrounding dense fibrosis consistent with the patient's history of cellulitis with complex abscess formation.   ·   No morphologic evidence of tissue-invasive fungal organisms, parasitic organisms, viral inclusions, epithelioid granulomas, atypical melanoc Pulse is Strong. Assessment  Pt's R medial leg wound presents with slight odor this session, so VASHE soak performed. Wound bed appears granular, however unable to visualize into undermined area from 1-3 o'clock.   Pt did note that her foot swelled to tibial tubercle (1)  Compression used: using Artiflex 10 cm (1), Artiflex 15 cm (1), Comprilan 08 cm (1), Comprilan 10 cm (2), Comprilan 12 cm (1)  Time spent (units are in minutes) using Time (15)  Negative pressure wound therapy  Negative pressure sys

## 2017-03-29 ENCOUNTER — OFFICE VISIT (OUTPATIENT)
Dept: WOUND CARE | Facility: HOSPITAL | Age: 37
End: 2017-03-29
Attending: NURSE PRACTITIONER
Payer: COMMERCIAL

## 2017-03-29 DIAGNOSIS — E11.65 UNCONTROLLED TYPE 2 DIABETES MELLITUS WITH COMPLICATION, WITH LONG-TERM CURRENT USE OF INSULIN (HCC): ICD-10-CM

## 2017-03-29 DIAGNOSIS — Z79.4 UNCONTROLLED TYPE 2 DIABETES MELLITUS WITH COMPLICATION, WITH LONG-TERM CURRENT USE OF INSULIN (HCC): ICD-10-CM

## 2017-03-29 DIAGNOSIS — E11.8 UNCONTROLLED TYPE 2 DIABETES MELLITUS WITH COMPLICATION, WITH LONG-TERM CURRENT USE OF INSULIN (HCC): ICD-10-CM

## 2017-03-29 DIAGNOSIS — L02.415 ABSCESS OF RIGHT LEG: Primary | ICD-10-CM

## 2017-03-29 PROCEDURE — 29581 APPL MULTLAYER CMPRN SYS LEG: CPT

## 2017-03-29 PROCEDURE — 97605 NEG PRS WND THER DME<=50SQCM: CPT

## 2017-03-29 RX ORDER — FLUOXETINE HYDROCHLORIDE 40 MG/1
40 CAPSULE ORAL DAILY
Qty: 30 CAPSULE | Refills: 3 | Status: SHIPPED | OUTPATIENT
Start: 2017-03-29 | End: 2017-10-24

## 2017-03-29 NOTE — PROGRESS NOTES
Subjective    Chief Complaint  This information was obtained from the patient  The patient was seen today for follow up and management of difficult to heal wound(s). 3/29/17 No complaints for today    Allergies  clindamycin, doxycycline, sulfa antybiotics, ·   Fragments of focally reactive-appearing, edematous skin and underlying subcutaneous soft tissue demonstrating focally prominent severe necrotic acute inflammatory infiltrates/exudates predominantly concentrated within the subcutaneous fibroadipose tiss Height/Length: 67 in (170.18 cm), Weight: 298.1 lbs (135.5 kgs), BMI: 46.7, Temperature: 98.5 °F (36.94 °C), Pulse: 89 bpm, Respiratory Rate: 16 breaths/min, Blood Pressure: 139/80 mmHg, Pulse Oximetry: 98 %.    Vitals Signs Notes: 3/29/17 blood sugar this E11.65: Type 2 diabetes mellitus with hyperglycemia  Z79.4: Long term (current) use of insulin  N17.9: Acute kidney failure, unspecified  L02.415: Cutaneous abscess of right lower limb        Plan    Cont 3x/week.   Pt to see Dr. Iqra Lucero 3/31 am and retur Decrease tunneling / undermining by 100%  Reduce wound area by 100%  Wound closure  Notes  Mepilex lite bordering periwound due to periwound irriation.  1/2\" foam medial to wound to cover indurated area

## 2017-03-31 ENCOUNTER — OFFICE VISIT (OUTPATIENT)
Dept: WOUND CARE | Facility: HOSPITAL | Age: 37
End: 2017-03-31
Attending: NURSE PRACTITIONER
Payer: COMMERCIAL

## 2017-03-31 ENCOUNTER — LAB ENCOUNTER (OUTPATIENT)
Dept: LAB | Facility: HOSPITAL | Age: 37
End: 2017-03-31
Attending: SURGERY
Payer: COMMERCIAL

## 2017-03-31 ENCOUNTER — APPOINTMENT (OUTPATIENT)
Dept: WOUND CARE | Facility: HOSPITAL | Age: 37
End: 2017-03-31
Payer: COMMERCIAL

## 2017-03-31 DIAGNOSIS — L02.415 ABSCESS OF RIGHT LEG: Primary | ICD-10-CM

## 2017-03-31 DIAGNOSIS — Z79.4 UNCONTROLLED TYPE 2 DIABETES MELLITUS WITH COMPLICATION, WITH LONG-TERM CURRENT USE OF INSULIN (HCC): ICD-10-CM

## 2017-03-31 DIAGNOSIS — E11.65 UNCONTROLLED TYPE 2 DIABETES MELLITUS WITH COMPLICATION, WITH LONG-TERM CURRENT USE OF INSULIN (HCC): ICD-10-CM

## 2017-03-31 DIAGNOSIS — L02.415 ABSCESS OF RIGHT LEG: ICD-10-CM

## 2017-03-31 DIAGNOSIS — E11.8 UNCONTROLLED TYPE 2 DIABETES MELLITUS WITH COMPLICATION, WITH LONG-TERM CURRENT USE OF INSULIN (HCC): ICD-10-CM

## 2017-03-31 PROCEDURE — 85025 COMPLETE CBC W/AUTO DIFF WBC: CPT

## 2017-03-31 PROCEDURE — 97605 NEG PRS WND THER DME<=50SQCM: CPT

## 2017-03-31 PROCEDURE — 29581 APPL MULTLAYER CMPRN SYS LEG: CPT

## 2017-03-31 PROCEDURE — 80048 BASIC METABOLIC PNL TOTAL CA: CPT

## 2017-03-31 PROCEDURE — 36415 COLL VENOUS BLD VENIPUNCTURE: CPT

## 2017-03-31 NOTE — PROGRESS NOTES
Subjective    Chief Complaint  This information was obtained from the patient  The patient was seen today for follow up and management of difficult to heal wound(s).  3/31/17 went to see Dr Alcon Fernández this am. She states he cut by the wound but is not sure w ·   Fragments of focally reactive-appearing, edematous skin and underlying subcutaneous soft tissue demonstrating focally prominent severe necrotic acute inflammatory infiltrates/exudates predominantly concentrated within the subcutaneous fibroadipose tiss Wound #1 Right, Medial Lower Leg is an acute Full Thickness Surgical Wound and has received a status of Not Healed.  Subsequent wound encounter measurements are 2.6cm length x 1.2cm width x 1.4cm depth, with an area of 3.12 sq cm and a volume of 4.368 cubic L02.415: Cutaneous abscess of right lower limb        Plan  Continue with 3x/week for NPWT and compression wrapping. Treatment Notes Summary  Wound #1 (Right, Medial Lower Leg)  . Wound Treatment Note  Assessed patient’s pain status and effectiveness of

## 2017-04-03 ENCOUNTER — OFFICE VISIT (OUTPATIENT)
Dept: WOUND CARE | Facility: HOSPITAL | Age: 37
End: 2017-04-03
Attending: NURSE PRACTITIONER
Payer: COMMERCIAL

## 2017-04-03 DIAGNOSIS — Z79.4 UNCONTROLLED TYPE 2 DIABETES MELLITUS WITH COMPLICATION, WITH LONG-TERM CURRENT USE OF INSULIN (HCC): Primary | ICD-10-CM

## 2017-04-03 DIAGNOSIS — E11.65 UNCONTROLLED TYPE 2 DIABETES MELLITUS WITH COMPLICATION, WITH LONG-TERM CURRENT USE OF INSULIN (HCC): Primary | ICD-10-CM

## 2017-04-03 DIAGNOSIS — E11.8 UNCONTROLLED TYPE 2 DIABETES MELLITUS WITH COMPLICATION, WITH LONG-TERM CURRENT USE OF INSULIN (HCC): Primary | ICD-10-CM

## 2017-04-03 DIAGNOSIS — L02.415 ABSCESS OF RIGHT LEG: ICD-10-CM

## 2017-04-03 PROCEDURE — 29581 APPL MULTLAYER CMPRN SYS LEG: CPT

## 2017-04-03 PROCEDURE — 97605 NEG PRS WND THER DME<=50SQCM: CPT

## 2017-04-03 NOTE — PROGRESS NOTES
Subjective    Chief Complaint  This information was obtained from the patient  The patient was seen today for follow up and management of difficult to heal wound(s).  4/3/17: Pt states she saw intermittent bleeding in the tube over the weekend, but not a lo formation, and surrounding dense fibrosis consistent with the patient's history of cellulitis with complex abscess formation.   ·   No morphologic evidence of tissue-invasive fungal organisms, parasitic organisms, viral inclusions, epithelioid granulomas, a 2 diabetes mellitus with unspecified complications  C04.77: Type 2 diabetes mellitus with hyperglycemia  Z79.4: Long term (current) use of insulin  N17.9: Acute kidney failure, unspecified  L02.415: Cutaneous abscess of right lower limb        Plan  Cont 3

## 2017-04-05 ENCOUNTER — OFFICE VISIT (OUTPATIENT)
Dept: WOUND CARE | Facility: HOSPITAL | Age: 37
End: 2017-04-05
Attending: NURSE PRACTITIONER
Payer: COMMERCIAL

## 2017-04-05 DIAGNOSIS — L02.415 ABSCESS OF RIGHT LEG: Primary | ICD-10-CM

## 2017-04-05 PROCEDURE — 97605 NEG PRS WND THER DME<=50SQCM: CPT

## 2017-04-05 PROCEDURE — 29581 APPL MULTLAYER CMPRN SYS LEG: CPT

## 2017-04-05 NOTE — PROGRESS NOTES
Subjective    Chief Complaint  This information was obtained from the patient  The patient was seen today for follow up and management of difficult to heal wound(s).  4/5/17: Pt feels that she can \"feel\" the wound more\"    Allergies  clindamycin, doxycyc consistent with the patient's history of cellulitis with complex abscess formation.   ·   No morphologic evidence of tissue-invasive fungal organisms, parasitic organisms, viral inclusions, epithelioid granulomas, atypical melanocytic cell proliferation, ep N17.9 - Acute kidney failure, unspecified  (Encounter Diagnosis) L02.415 - Cutaneous abscess of right lower limb    Diagnoses    ICD-10  E11.8: Type 2 diabetes mellitus with unspecified complications  N71.92: Type 2 diabetes mellitus with hyperglycemia  Z7 wound. 1/2\" foam medial to wound to cover indurated area.

## 2017-04-07 ENCOUNTER — OFFICE VISIT (OUTPATIENT)
Dept: WOUND CARE | Facility: HOSPITAL | Age: 37
End: 2017-04-07
Attending: NURSE PRACTITIONER
Payer: COMMERCIAL

## 2017-04-07 DIAGNOSIS — E11.65 UNCONTROLLED TYPE 2 DIABETES MELLITUS WITH COMPLICATION, WITH LONG-TERM CURRENT USE OF INSULIN (HCC): ICD-10-CM

## 2017-04-07 DIAGNOSIS — E11.8 UNCONTROLLED TYPE 2 DIABETES MELLITUS WITH COMPLICATION, WITH LONG-TERM CURRENT USE OF INSULIN (HCC): ICD-10-CM

## 2017-04-07 DIAGNOSIS — L02.415 ABSCESS OF RIGHT LEG: Primary | ICD-10-CM

## 2017-04-07 DIAGNOSIS — Z79.4 UNCONTROLLED TYPE 2 DIABETES MELLITUS WITH COMPLICATION, WITH LONG-TERM CURRENT USE OF INSULIN (HCC): ICD-10-CM

## 2017-04-07 PROCEDURE — 99212 OFFICE O/P EST SF 10 MIN: CPT

## 2017-04-07 NOTE — PROGRESS NOTES
Subjective    Chief Complaint  This information was obtained from the patient  The patient was seen today for follow up and management of difficult to heal wound(s).  4/5/17: Pt feels that she can \"feel\" the wound more\"    General Notes:  Pt feels itchin organizing fibrin thrombus formation, and surrounding dense fibrosis consistent with the patient's history of cellulitis with complex abscess formation.   ·   No morphologic evidence of tissue-invasive fungal organisms, parasitic organisms, viral inclusions has a strong odor. The patient reports a wound pain of level 1/10. The wound margin is well defined. Wound bed is % bright red granulation. There is no change noted in the wound progression.    The periwound skin moisture is normal. The periwound skin tape/stockinet/wrap. using Medipore (1)  Applied Compression device.  using Tubigrip F (2)  Negative pressure wound therapy  Negative pressure system used: using [employer] Acti-VAC (1)  Number of dressings removed from wound: using 1 (1)

## 2017-04-10 ENCOUNTER — OFFICE VISIT (OUTPATIENT)
Dept: WOUND CARE | Facility: HOSPITAL | Age: 37
End: 2017-04-10
Attending: NURSE PRACTITIONER
Payer: COMMERCIAL

## 2017-04-10 DIAGNOSIS — L02.415 ABSCESS OF RIGHT LEG: Primary | ICD-10-CM

## 2017-04-10 PROCEDURE — 29581 APPL MULTLAYER CMPRN SYS LEG: CPT

## 2017-04-10 PROCEDURE — 97605 NEG PRS WND THER DME<=50SQCM: CPT

## 2017-04-10 NOTE — PROGRESS NOTES
Subjective    Chief Complaint  This information was obtained from the patient  The patient was seen today for follow up and management of difficult to heal wound(s). 4/10/17: No new complaints.  Pt did dressing change twice a day over the weekend    Allergi surrounding dense fibrosis consistent with the patient's history of cellulitis with complex abscess formation.   ·   No morphologic evidence of tissue-invasive fungal organisms, parasitic organisms, viral inclusions, epithelioid granulomas, atypical melanoc complications  L31.36: Type 2 diabetes mellitus with hyperglycemia  Z79.4: Long term (current) use of insulin  N17.9: Acute kidney failure, unspecified  L02.415: Cutaneous abscess of right lower limb        Plan  Cont 3x/week.   Pt to see Dr. Carroll Galo on 4

## 2017-04-12 ENCOUNTER — OFFICE VISIT (OUTPATIENT)
Dept: WOUND CARE | Facility: HOSPITAL | Age: 37
End: 2017-04-12
Attending: NURSE PRACTITIONER
Payer: COMMERCIAL

## 2017-04-12 DIAGNOSIS — E11.8 UNCONTROLLED TYPE 2 DIABETES MELLITUS WITH COMPLICATION, WITH LONG-TERM CURRENT USE OF INSULIN (HCC): Primary | ICD-10-CM

## 2017-04-12 DIAGNOSIS — E11.65 UNCONTROLLED TYPE 2 DIABETES MELLITUS WITH COMPLICATION, WITH LONG-TERM CURRENT USE OF INSULIN (HCC): Primary | ICD-10-CM

## 2017-04-12 DIAGNOSIS — Z79.4 UNCONTROLLED TYPE 2 DIABETES MELLITUS WITH COMPLICATION, WITH LONG-TERM CURRENT USE OF INSULIN (HCC): Primary | ICD-10-CM

## 2017-04-12 DIAGNOSIS — L02.415 ABSCESS OF RIGHT LEG: ICD-10-CM

## 2017-04-12 PROCEDURE — 97605 NEG PRS WND THER DME<=50SQCM: CPT

## 2017-04-12 PROCEDURE — 29581 APPL MULTLAYER CMPRN SYS LEG: CPT

## 2017-04-12 NOTE — PROGRESS NOTES
Subjective    Chief Complaint  This information was obtained from the patient  The patient was seen today for follow up and management of difficult to heal wound(s). 4/12/17: No new complaints.      Allergies  clindamycin, doxycycline, sulfa antybiotics, va history of cellulitis with complex abscess formation.   ·   No morphologic evidence of tissue-invasive fungal organisms, parasitic organisms, viral inclusions, epithelioid granulomas, atypical melanocytic cell proliferation, epithelial dysplasia or malignan limb    Diagnoses    ICD-10  E11.8: Type 2 diabetes mellitus with unspecified complications  W06.55: Type 2 diabetes mellitus with hyperglycemia  Z79.4: Long term (current) use of insulin  N17.9: Acute kidney failure, unspecified  L02.415: Cutaneous absces

## 2017-04-14 ENCOUNTER — OFFICE VISIT (OUTPATIENT)
Dept: WOUND CARE | Facility: HOSPITAL | Age: 37
End: 2017-04-14
Attending: NURSE PRACTITIONER
Payer: COMMERCIAL

## 2017-04-14 DIAGNOSIS — L02.415 ABSCESS OF RIGHT LEG: Primary | ICD-10-CM

## 2017-04-14 PROCEDURE — 29581 APPL MULTLAYER CMPRN SYS LEG: CPT

## 2017-04-14 PROCEDURE — 97605 NEG PRS WND THER DME<=50SQCM: CPT

## 2017-04-14 NOTE — PROGRESS NOTES
Subjective    Chief Complaint  This information was obtained from the patient  The patient was seen today for follow up and management of difficult to heal wound(s).      General Notes:  4/14/17 - Pt states she had a blood sugar of 205 this morning after br ·   Fragments of focally reactive-appearing, edematous skin and underlying subcutaneous soft tissue demonstrating focally prominent severe necrotic acute inflammatory infiltrates/exudates predominantly concentrated within the subcutaneous fibroadipose tiss Wound #1 Right, Medial Lower Leg is an acute Full Thickness Surgical Wound and has received a status of Not Healed.  Subsequent wound encounter measurements are 1.6cm length x 0.7cm width x 0.6cm depth, with an area of 1.12 sq cm and a volume of 0.672 cubic Cleansed wound and periwound with non-cytotoxic agent. using Wound Cleanser Spray (1)  Applied topical product to demarco-wound area avoiding wound base.  using Betamethasone valerate 0.1% cream (1)  Compression wrapping  Moisturizing lotion applied to skin  S

## 2017-04-17 ENCOUNTER — OFFICE VISIT (OUTPATIENT)
Dept: WOUND CARE | Facility: HOSPITAL | Age: 37
End: 2017-04-17
Attending: NURSE PRACTITIONER
Payer: COMMERCIAL

## 2017-04-17 DIAGNOSIS — Z79.4 UNCONTROLLED TYPE 2 DIABETES MELLITUS WITH COMPLICATION, WITH LONG-TERM CURRENT USE OF INSULIN (HCC): ICD-10-CM

## 2017-04-17 DIAGNOSIS — E11.8 UNCONTROLLED TYPE 2 DIABETES MELLITUS WITH COMPLICATION, WITH LONG-TERM CURRENT USE OF INSULIN (HCC): ICD-10-CM

## 2017-04-17 DIAGNOSIS — E11.65 UNCONTROLLED TYPE 2 DIABETES MELLITUS WITH COMPLICATION, WITH LONG-TERM CURRENT USE OF INSULIN (HCC): ICD-10-CM

## 2017-04-17 DIAGNOSIS — L02.415 ABSCESS OF RIGHT LEG: Primary | ICD-10-CM

## 2017-04-17 PROCEDURE — 29581 APPL MULTLAYER CMPRN SYS LEG: CPT

## 2017-04-17 PROCEDURE — 97605 NEG PRS WND THER DME<=50SQCM: CPT

## 2017-04-17 NOTE — PROGRESS NOTES
Subjective    Chief Complaint  This information was obtained from the patient  The patient was seen today for follow up and management of difficult to heal wound(s). 4/17/17: No new complaints.  States she has been \"feeling the sponge when I move\"    Al surrounding dense fibrosis consistent with the patient's history of cellulitis with complex abscess formation.   ·   No morphologic evidence of tissue-invasive fungal organisms, parasitic organisms, viral inclusions, epithelioid granulomas, atypical melanoc Diagnosis) E11.65 - Type 2 diabetes mellitus with hyperglycemia  (Encounter Diagnosis) Z79.4 - Long term (current) use of insulin  (Encounter Diagnosis) N17.9 - Acute kidney failure, unspecified  (Encounter Diagnosis) L02.415 - Cutaneous abscess of right l product to demarco-wound area avoiding wound base. using Betamethasone valerate 0.1% cream (1)  Applied Primary Wound Dressing.  using Endoform 2x2 (1)  Compression wrapping  Moisturizing lotion applied to skin  Stockinette applied using 4\" (1)  Compression a

## 2017-04-19 ENCOUNTER — PATIENT MESSAGE (OUTPATIENT)
Dept: INTERNAL MEDICINE CLINIC | Facility: CLINIC | Age: 37
End: 2017-04-19

## 2017-04-19 ENCOUNTER — OFFICE VISIT (OUTPATIENT)
Dept: WOUND CARE | Facility: HOSPITAL | Age: 37
End: 2017-04-19
Attending: NURSE PRACTITIONER
Payer: COMMERCIAL

## 2017-04-19 DIAGNOSIS — Z79.4 UNCONTROLLED TYPE 2 DIABETES MELLITUS WITH COMPLICATION, WITH LONG-TERM CURRENT USE OF INSULIN (HCC): ICD-10-CM

## 2017-04-19 DIAGNOSIS — L02.415 ABSCESS OF RIGHT LEG: Primary | ICD-10-CM

## 2017-04-19 DIAGNOSIS — E11.65 UNCONTROLLED TYPE 2 DIABETES MELLITUS WITH COMPLICATION, WITH LONG-TERM CURRENT USE OF INSULIN (HCC): ICD-10-CM

## 2017-04-19 DIAGNOSIS — E11.8 UNCONTROLLED TYPE 2 DIABETES MELLITUS WITH COMPLICATION, WITH LONG-TERM CURRENT USE OF INSULIN (HCC): ICD-10-CM

## 2017-04-19 PROCEDURE — 29581 APPL MULTLAYER CMPRN SYS LEG: CPT

## 2017-04-19 RX ORDER — GABAPENTIN 300 MG/1
300 CAPSULE ORAL 3 TIMES DAILY
Qty: 90 CAPSULE | Refills: 3 | Status: SHIPPED | OUTPATIENT
Start: 2017-04-19 | End: 2017-06-08 | Stop reason: ALTCHOICE

## 2017-04-19 NOTE — TELEPHONE ENCOUNTER
From: Arlette Zhao  To: Jacinta Brower MD  Sent: 4/19/2017 12:29 AM CDT  Subject: Prescription Question    Would it be possible to get a refill on gabapentin? I'm having some nerve pain near the hole in my leg.  Previously, nerve pain had nearly go

## 2017-04-19 NOTE — PROGRESS NOTES
Subjective    Chief Complaint  This information was obtained from the patient  The patient was seen today for follow up and management of difficult to heal wound(s). 4/17/17: No new complaints.  States she has been \"feeling the sponge when I move\"    Ge vessel intraluminal organizing fibrin thrombus formation, and surrounding dense fibrosis consistent with the patient's history of cellulitis with complex abscess formation.   ·   No morphologic evidence of tissue-invasive fungal organisms, parasitic organis sero-sanguineous drainage noted which has no odor. The patient reports a wound pain of level 0/10. The wound margin is well defined. Wound bed is % bright red granulation. The wound is improving.    The periwound skin moisture is normal. The periwound Fibracol (1), Vaseline Gauze (1)  Applied Secondary Wound Dressing.  using Mepilex foam 6x6 (1)  Compression wrapping  Moisturizing lotion applied to skin  Stockinette applied using 4\" (1)  Compression applied to: using Toe bases to tibial tubercle (1)  Co

## 2017-04-20 ENCOUNTER — TELEPHONE (OUTPATIENT)
Dept: INTERNAL MEDICINE CLINIC | Facility: CLINIC | Age: 37
End: 2017-04-20

## 2017-04-20 NOTE — TELEPHONE ENCOUNTER
Pt is due for [facility]'S PSY[facility]IATR[facility] [place] [facility] wellness exam anytime this calendar year. Left message to call back.

## 2017-04-21 ENCOUNTER — OFFICE VISIT (OUTPATIENT)
Dept: WOUND CARE | Facility: HOSPITAL | Age: 37
End: 2017-04-21
Attending: NURSE PRACTITIONER
Payer: COMMERCIAL

## 2017-04-21 DIAGNOSIS — L02.415 ABSCESS OF RIGHT LEG: Primary | ICD-10-CM

## 2017-04-21 PROCEDURE — 29581 APPL MULTLAYER CMPRN SYS LEG: CPT

## 2017-04-21 NOTE — PROGRESS NOTES
Subjective    Chief Complaint  This information was obtained from the patient  The patient was seen today for follow up and management of difficult to heal wound(s). 4/20/17: No new complaints.     Allergies  clindamycin, doxycycline, sulfa antybiotics, v patient's history of cellulitis with complex abscess formation.   ·   No morphologic evidence of tissue-invasive fungal organisms, parasitic organisms, viral inclusions, epithelioid granulomas, atypical melanocytic cell proliferation, epithelial dysplasia o Cutaneous abscess of right lower limb    Diagnoses    ICD-10  E11.8: Type 2 diabetes mellitus with unspecified complications  N27.01: Type 2 diabetes mellitus with hyperglycemia  Z79.4: Long term (current) use of insulin  N17.9: Acute kidney failure, unspe

## 2017-04-26 ENCOUNTER — OFFICE VISIT (OUTPATIENT)
Dept: WOUND CARE | Facility: HOSPITAL | Age: 37
End: 2017-04-26
Attending: NURSE PRACTITIONER
Payer: COMMERCIAL

## 2017-04-26 DIAGNOSIS — E11.8 UNCONTROLLED TYPE 2 DIABETES MELLITUS WITH COMPLICATION, WITH LONG-TERM CURRENT USE OF INSULIN (HCC): ICD-10-CM

## 2017-04-26 DIAGNOSIS — Z79.4 UNCONTROLLED TYPE 2 DIABETES MELLITUS WITH COMPLICATION, WITH LONG-TERM CURRENT USE OF INSULIN (HCC): ICD-10-CM

## 2017-04-26 DIAGNOSIS — E11.65 UNCONTROLLED TYPE 2 DIABETES MELLITUS WITH COMPLICATION, WITH LONG-TERM CURRENT USE OF INSULIN (HCC): ICD-10-CM

## 2017-04-26 DIAGNOSIS — L02.415 ABSCESS OF RIGHT LEG: Primary | ICD-10-CM

## 2017-04-26 PROCEDURE — 29581 APPL MULTLAYER CMPRN SYS LEG: CPT

## 2017-04-26 NOTE — PROGRESS NOTES
Subjective    Chief Complaint  This information was obtained from the patient  The patient was seen today for follow up and management of difficult to heal wound(s).   4/26/17: Pt states she has been having some nerve pain again.     Allergies  clindamycin, ·   Fragments of focally reactive-appearing, edematous skin and underlying subcutaneous soft tissue demonstrating focally prominent severe necrotic acute inflammatory infiltrates/exudates predominantly concentrated within the subcutaneous fibroadipose tiss Wound #1 Right, Medial Lower Leg is an acute Full Thickness Surgical Wound and has received a status of Not Healed.  Subsequent wound encounter measurements are 2.7cm length x 0.9cm width x 0.2cm depth, with an area of 2.43 sq cm and a volume of 0.486 cubic Plan    Cont 1-3x/week per POC as above. DC VAC if depth remains minimal.            Entered By: Myesha Torrez on 04/26/2017 4:31:04 PM   Signature(s): Date(s):  Treatment Notes Summary  Wound #1 (Right, Medial Lower Leg)  . Wound Treatment Note  Assessed

## 2017-05-01 ENCOUNTER — OFFICE VISIT (OUTPATIENT)
Dept: WOUND CARE | Facility: HOSPITAL | Age: 37
End: 2017-05-01
Attending: NURSE PRACTITIONER
Payer: COMMERCIAL

## 2017-05-01 DIAGNOSIS — L02.415 ABSCESS OF RIGHT LEG: Primary | ICD-10-CM

## 2017-05-01 PROCEDURE — 29581 APPL MULTLAYER CMPRN SYS LEG: CPT

## 2017-05-01 NOTE — PROGRESS NOTES
Subjective    Chief Complaint  This information was obtained from the patient  The patient was seen today for follow up and management of difficult to heal wound(s).    5/1/17: Pt states she has been having some nerve pain but not as much as previous visits formation, and surrounding dense fibrosis consistent with the patient's history of cellulitis with complex abscess formation.   ·   No morphologic evidence of tissue-invasive fungal organisms, parasitic organisms, viral inclusions, epithelioid granulomas, a right lower limb    Diagnoses    ICD-10  E11.8: Type 2 diabetes mellitus with unspecified complications  W60.33: Type 2 diabetes mellitus with hyperglycemia  Z79.4: Long term (current) use of insulin  N17.9: Acute kidney failure, unspecified  L02.415: Cuta

## 2017-05-03 ENCOUNTER — APPOINTMENT (OUTPATIENT)
Dept: WOUND CARE | Facility: HOSPITAL | Age: 37
End: 2017-05-03
Attending: NURSE PRACTITIONER
Payer: COMMERCIAL

## 2017-05-05 ENCOUNTER — APPOINTMENT (OUTPATIENT)
Dept: WOUND CARE | Facility: HOSPITAL | Age: 37
End: 2017-05-05
Attending: NURSE PRACTITIONER
Payer: COMMERCIAL

## 2017-05-08 ENCOUNTER — OFFICE VISIT (OUTPATIENT)
Dept: ENDOCRINOLOGY CLINIC | Facility: CLINIC | Age: 37
End: 2017-05-08

## 2017-05-08 ENCOUNTER — APPOINTMENT (OUTPATIENT)
Dept: WOUND CARE | Facility: HOSPITAL | Age: 37
End: 2017-05-08
Attending: NURSE PRACTITIONER
Payer: COMMERCIAL

## 2017-05-08 ENCOUNTER — TELEPHONE (OUTPATIENT)
Dept: INTERNAL MEDICINE CLINIC | Facility: CLINIC | Age: 37
End: 2017-05-08

## 2017-05-08 VITALS
DIASTOLIC BLOOD PRESSURE: 72 MMHG | SYSTOLIC BLOOD PRESSURE: 118 MMHG | BODY MASS INDEX: 45.99 KG/M2 | HEIGHT: 67 IN | WEIGHT: 293 LBS | HEART RATE: 80 BPM

## 2017-05-08 DIAGNOSIS — L02.415 ABSCESS OF RIGHT LEG: Primary | ICD-10-CM

## 2017-05-08 DIAGNOSIS — Z79.4 UNCONTROLLED TYPE 2 DIABETES MELLITUS WITH HYPERGLYCEMIA, WITH LONG-TERM CURRENT USE OF INSULIN (HCC): Primary | ICD-10-CM

## 2017-05-08 DIAGNOSIS — E11.65 UNCONTROLLED TYPE 2 DIABETES MELLITUS WITH HYPERGLYCEMIA, WITH LONG-TERM CURRENT USE OF INSULIN (HCC): Primary | ICD-10-CM

## 2017-05-08 PROCEDURE — 36416 COLLJ CAPILLARY BLOOD SPEC: CPT | Performed by: INTERNAL MEDICINE

## 2017-05-08 PROCEDURE — 29581 APPL MULTLAYER CMPRN SYS LEG: CPT

## 2017-05-08 PROCEDURE — 99213 OFFICE O/P EST LOW 20 MIN: CPT | Performed by: INTERNAL MEDICINE

## 2017-05-08 PROCEDURE — 82962 GLUCOSE BLOOD TEST: CPT | Performed by: INTERNAL MEDICINE

## 2017-05-08 PROCEDURE — 83036 HEMOGLOBIN GLYCOSYLATED A1C: CPT | Performed by: INTERNAL MEDICINE

## 2017-05-08 RX ORDER — BLOOD-GLUCOSE METER
EACH MISCELLANEOUS
COMMUNITY
End: 2019-12-20

## 2017-05-08 NOTE — PROGRESS NOTES
Name: Maria Queen  Date: 5/8/2017    Referring Physician: No ref. provider found    HISTORY OF PRESENT ILLNESS   Maria Queen is a 39year old female who presents for diabetes mellitus, diagnosed over 10 years ago.       Prior HbA, C or glycohemoglob For use with insulin pen, up to 5 injections daily, Disp: 200 each, Rfl: 6     Allergies:     Clindamycin               Doxycycline             Itching  Sulfa Antibiotics         Vancomycin              Itching    Social History:   Social History    Teena healing, will transition to GLP-1 once wound completely healed   -Discussed importance of glycemic control to prevent complications of diabetes  -Discussed complications of diabetes include retinopathy, neuropathy, nephropathy and cardiovascular disease  -

## 2017-05-08 NOTE — PROGRESS NOTES
Chief Complaint  This information was obtained from the patient  The patient was seen today for follow up and management of difficult to heal wound(s).    5/8/17 patient has been having some itching    Allergies  clindamycin, doxycycline, sulfa antybiotics, cellulitis with complex abscess formation.   · No morphologic evidence of tissue-invasive fungal organisms, parasitic organisms, viral inclusions, epithelioid granulomas, atypical melanocytic cell proliferation, epithelial dysplasia or malignancy identified is normal. The periwound skin color is normal. The periwound skin exhibited: Edema, Rash. The temperature of the periwound skin is WNL. Periwound skin does not exhibit signs or symptoms of infection.         Assessment    Active Problems    ICD-10  (Encount minutes (1)   Applied topical product to demarco-wound area avoiding wound base. using Betamethasone valerate 0.1% cream (1)   Applied Primary Wound Dressing. using Xeroform 2x2 (1)   Applied Secondary Wound Dressing.  using Gauze (sterile) 4x4 (1)   Dressing

## 2017-05-08 NOTE — TELEPHONE ENCOUNTER
Pt is due for [facility]'S PSYCHIATRIC [place] [facility] wellness exam anytime this calendar year. Left message to call back [phone].

## 2017-05-09 ENCOUNTER — TELEPHONE (OUTPATIENT)
Dept: INTERNAL MEDICINE CLINIC | Facility: CLINIC | Age: 37
End: 2017-05-09

## 2017-05-09 DIAGNOSIS — Z01.00 DIABETIC EYE EXAM (HCC): Primary | ICD-10-CM

## 2017-05-09 DIAGNOSIS — E11.9 DIABETIC EYE EXAM (HCC): Primary | ICD-10-CM

## 2017-05-11 ENCOUNTER — OFFICE VISIT (OUTPATIENT)
Dept: WOUND CARE | Facility: HOSPITAL | Age: 37
End: 2017-05-11
Attending: NURSE PRACTITIONER
Payer: COMMERCIAL

## 2017-05-11 DIAGNOSIS — L02.415 ABSCESS OF RIGHT LEG: Primary | ICD-10-CM

## 2017-05-11 PROCEDURE — 29581 APPL MULTLAYER CMPRN SYS LEG: CPT

## 2017-05-11 RX ORDER — INSULIN ASPART 100 [IU]/ML
INJECTION, SOLUTION INTRAVENOUS; SUBCUTANEOUS
Qty: 15 ML | Refills: 2 | Status: SHIPPED | OUTPATIENT
Start: 2017-05-11 | End: 2018-01-24

## 2017-05-11 NOTE — PROGRESS NOTES
Subjective    Chief Complaint  This information was obtained from the patient  The patient was seen today for follow up and management of difficult to heal wound(s).    5/11/17 patient has been having some pain around the ankle area- felt the last wrap was formation, and surrounding dense fibrosis consistent with the patient's history of cellulitis with complex abscess formation.   ·   No morphologic evidence of tissue-invasive fungal organisms, parasitic organisms, viral inclusions, epithelioid granulomas, a N17.9 - Acute kidney failure, unspecified  (Encounter Diagnosis) L02.415 - Cutaneous abscess of right lower limb    Diagnoses    ICD-10  E11.8: Type 2 diabetes mellitus with unspecified complications  B98.31: Type 2 diabetes mellitus with hyperglycemia  Z7

## 2017-05-15 ENCOUNTER — APPOINTMENT (OUTPATIENT)
Dept: WOUND CARE | Facility: HOSPITAL | Age: 37
End: 2017-05-15
Payer: COMMERCIAL

## 2017-05-17 ENCOUNTER — OFFICE VISIT (OUTPATIENT)
Dept: WOUND CARE | Facility: HOSPITAL | Age: 37
End: 2017-05-17
Attending: NURSE PRACTITIONER
Payer: COMMERCIAL

## 2017-05-17 DIAGNOSIS — Z79.4 UNCONTROLLED TYPE 2 DIABETES MELLITUS WITH COMPLICATION, WITH LONG-TERM CURRENT USE OF INSULIN (HCC): ICD-10-CM

## 2017-05-17 DIAGNOSIS — E11.65 UNCONTROLLED TYPE 2 DIABETES MELLITUS WITH COMPLICATION, WITH LONG-TERM CURRENT USE OF INSULIN (HCC): ICD-10-CM

## 2017-05-17 DIAGNOSIS — E11.8 UNCONTROLLED TYPE 2 DIABETES MELLITUS WITH COMPLICATION, WITH LONG-TERM CURRENT USE OF INSULIN (HCC): ICD-10-CM

## 2017-05-17 DIAGNOSIS — L02.415 ABSCESS OF RIGHT LEG: Primary | ICD-10-CM

## 2017-05-17 PROCEDURE — 29581 APPL MULTLAYER CMPRN SYS LEG: CPT

## 2017-05-17 NOTE — PROGRESS NOTES
Subjective    Chief Complaint  This information was obtained from the patient  The patient was seen today for follow up and management of difficult to heal wound(s). 5/17/17: No new complaints.     Allergies  clindamycin, doxycycline, sulfa antybiotics, v ·   Fragments of focally reactive-appearing, edematous skin and underlying subcutaneous soft tissue demonstrating focally prominent severe necrotic acute inflammatory infiltrates/exudates predominantly concentrated within the subcutaneous fibroadipose tiss Wound #1 Right, Medial Lower Leg is an acute Full Thickness Surgical Wound and has received a status of Not Healed.  Subsequent wound encounter measurements are 0.6cm length x 0.1cm width x 0.1cm depth, with an area of 0.06 sq cm and a volume of 0.006 cubic L02.415: Cutaneous abscess of right lower limb        Plan    Cont 1x/week per POC. Entered By: Ana Vernon on 05/17/2017 4:36:13 PM   Signature(s): Date(s):  Treatment Notes Summary  Wound #1 (Right, Medial Lower Leg)  . Wound Treatment Note

## 2017-05-18 ENCOUNTER — TELEPHONE (OUTPATIENT)
Dept: INTERNAL MEDICINE CLINIC | Facility: CLINIC | Age: 37
End: 2017-05-18

## 2017-05-18 NOTE — TELEPHONE ENCOUNTER
Contacted patient to inquire about DAA appointment. Patient was very interested in having a DAA, as she is concerned with her vision. Next available appointment for DAA is in August. Patient requesting to be seen at an earlier date.  Please contact patient

## 2017-05-23 ENCOUNTER — OFFICE VISIT (OUTPATIENT)
Dept: WOUND CARE | Facility: HOSPITAL | Age: 37
End: 2017-05-23
Attending: NURSE PRACTITIONER
Payer: COMMERCIAL

## 2017-05-23 DIAGNOSIS — L02.415 ABSCESS OF RIGHT LEG: Primary | ICD-10-CM

## 2017-05-23 PROCEDURE — 29581 APPL MULTLAYER CMPRN SYS LEG: CPT

## 2017-05-23 NOTE — PROGRESS NOTES
Subjective    Chief Complaint  This information was obtained from the patient  The patient was seen today for follow up and management of difficult to heal wound(s). 5/17/17: No new complaints.     General Notes:  no concerns for today  Allergies  clindam ·   Fragments of focally reactive-appearing, edematous skin and underlying subcutaneous soft tissue demonstrating focally prominent severe necrotic acute inflammatory infiltrates/exudates predominantly concentrated within the subcutaneous fibroadipose tiss Height/Length: 67 in (170.18 cm), Weight: 298.1 lbs (135.5 kgs), BMI: 46.7, Temperature: 98.5 °F (36.94 °C), Pulse: 85 bpm, Respiratory Rate: 17 breaths/min, Blood Pressure: 124/81 mmHg, Pulse Oximetry: 96 %.    Vitals Signs Notes: B/S this     Integu Pt will continue with current POC 1x/week until she is able to be transitioned into compression stocking. Treatment Notes Summary  Wound #1 (Right, Medial Lower Leg)  . Wound Treatment Note  Assessed patient’s pain status and effectiveness of pain iliana

## 2017-05-24 ENCOUNTER — APPOINTMENT (OUTPATIENT)
Dept: WOUND CARE | Facility: HOSPITAL | Age: 37
End: 2017-05-24
Payer: COMMERCIAL

## 2017-05-26 ENCOUNTER — APPOINTMENT (OUTPATIENT)
Dept: WOUND CARE | Facility: HOSPITAL | Age: 37
End: 2017-05-26
Payer: COMMERCIAL

## 2017-05-30 ENCOUNTER — OFFICE VISIT (OUTPATIENT)
Dept: WOUND CARE | Facility: HOSPITAL | Age: 37
End: 2017-05-30
Attending: NURSE PRACTITIONER
Payer: COMMERCIAL

## 2017-05-30 DIAGNOSIS — L02.415 ABSCESS OF RIGHT LEG: Primary | ICD-10-CM

## 2017-05-30 DIAGNOSIS — L03.115 CELLULITIS OF RIGHT LEG: ICD-10-CM

## 2017-05-30 PROCEDURE — 99211 OFF/OP EST MAY X REQ PHY/QHP: CPT

## 2017-05-30 NOTE — PROGRESS NOTES
Subjective    Chief Complaint  This information was obtained from the patient  The patient was seen today for follow up and management of difficult to heal wound(s).     General Notes:  5/30/17: Pt states she got her wraps wet over the weekend so she has be organizing fibrin thrombus formation, and surrounding dense fibrosis consistent with the patient's history of cellulitis with complex abscess formation.   ·   No morphologic evidence of tissue-invasive fungal organisms, parasitic organisms, viral inclusions all the previous goals have been met:  Written PT Goals - . Short Term (4-6 weeks)  Reduce wound area by 50% - met  Decrease depth by 50% - met  Decrease tunneling / undermining by 50% - met  Decrease induration around periwound as indicated by pliable tiss

## 2017-06-02 ENCOUNTER — APPOINTMENT (OUTPATIENT)
Dept: WOUND CARE | Facility: HOSPITAL | Age: 37
End: 2017-06-02
Payer: COMMERCIAL

## 2017-06-05 ENCOUNTER — APPOINTMENT (OUTPATIENT)
Dept: WOUND CARE | Facility: HOSPITAL | Age: 37
End: 2017-06-05
Payer: COMMERCIAL

## 2017-06-07 ENCOUNTER — APPOINTMENT (OUTPATIENT)
Dept: WOUND CARE | Facility: HOSPITAL | Age: 37
End: 2017-06-07
Payer: COMMERCIAL

## 2017-06-08 ENCOUNTER — NURSE ONLY (OUTPATIENT)
Dept: OPHTHALMOLOGY | Facility: CLINIC | Age: 37
End: 2017-06-08

## 2017-06-08 ENCOUNTER — OFFICE VISIT (OUTPATIENT)
Dept: INTERNAL MEDICINE CLINIC | Facility: CLINIC | Age: 37
End: 2017-06-08

## 2017-06-08 ENCOUNTER — LAB ENCOUNTER (OUTPATIENT)
Dept: LAB | Facility: HOSPITAL | Age: 37
End: 2017-06-08
Attending: OPHTHALMOLOGY
Payer: COMMERCIAL

## 2017-06-08 ENCOUNTER — OFFICE VISIT (OUTPATIENT)
Dept: OPHTHALMOLOGY | Facility: CLINIC | Age: 37
End: 2017-06-08

## 2017-06-08 VITALS
DIASTOLIC BLOOD PRESSURE: 79 MMHG | SYSTOLIC BLOOD PRESSURE: 134 MMHG | HEIGHT: 67 IN | TEMPERATURE: 99 F | HEART RATE: 73 BPM | BODY MASS INDEX: 45.99 KG/M2 | WEIGHT: 293 LBS

## 2017-06-08 VITALS — DIASTOLIC BLOOD PRESSURE: 88 MMHG | SYSTOLIC BLOOD PRESSURE: 122 MMHG

## 2017-06-08 DIAGNOSIS — Z01.818 PREOP EXAMINATION: ICD-10-CM

## 2017-06-08 DIAGNOSIS — H52.13 HIGH MYOPIA, BILATERAL: ICD-10-CM

## 2017-06-08 DIAGNOSIS — E11.8 UNCONTROLLED TYPE 2 DIABETES MELLITUS WITH COMPLICATION, WITH LONG-TERM CURRENT USE OF INSULIN (HCC): Primary | ICD-10-CM

## 2017-06-08 DIAGNOSIS — H59.811 CHORIORETINAL SCARS AFTER SURGERY FOR DETACHMENT, RIGHT EYE: Primary | ICD-10-CM

## 2017-06-08 DIAGNOSIS — Z01.818 PRE-OP EXAM: Primary | ICD-10-CM

## 2017-06-08 DIAGNOSIS — E10.9 TYPE 1 DIABETES MELLITUS WITHOUT RETINOPATHY (HCC): ICD-10-CM

## 2017-06-08 DIAGNOSIS — H33.21 RETINAL DETACHMENT, RIGHT: Primary | ICD-10-CM

## 2017-06-08 DIAGNOSIS — Z79.4 UNCONTROLLED TYPE 2 DIABETES MELLITUS WITH COMPLICATION, WITH LONG-TERM CURRENT USE OF INSULIN (HCC): Primary | ICD-10-CM

## 2017-06-08 DIAGNOSIS — E11.65 UNCONTROLLED TYPE 2 DIABETES MELLITUS WITH COMPLICATION, WITH LONG-TERM CURRENT USE OF INSULIN (HCC): Primary | ICD-10-CM

## 2017-06-08 PROBLEM — H33.20 RETINAL DETACHMENT: Status: ACTIVE | Noted: 2017-06-08

## 2017-06-08 PROBLEM — H52.10 HIGH MYOPIA: Status: ACTIVE | Noted: 2017-06-08

## 2017-06-08 PROCEDURE — 99244 OFF/OP CNSLTJ NEW/EST MOD 40: CPT | Performed by: OPHTHALMOLOGY

## 2017-06-08 PROCEDURE — 36415 COLL VENOUS BLD VENIPUNCTURE: CPT

## 2017-06-08 PROCEDURE — 93005 ELECTROCARDIOGRAM TRACING: CPT | Performed by: INTERNAL MEDICINE

## 2017-06-08 PROCEDURE — 85025 COMPLETE CBC W/AUTO DIFF WBC: CPT

## 2017-06-08 PROCEDURE — G0438 PPPS, INITIAL VISIT: HCPCS | Performed by: INTERNAL MEDICINE

## 2017-06-08 PROCEDURE — 99212 OFFICE O/P EST SF 10 MIN: CPT | Performed by: OPHTHALMOLOGY

## 2017-06-08 PROCEDURE — 93010 ELECTROCARDIOGRAM REPORT: CPT | Performed by: INTERNAL MEDICINE

## 2017-06-08 PROCEDURE — 99395 PREV VISIT EST AGE 18-39: CPT | Performed by: INTERNAL MEDICINE

## 2017-06-08 PROCEDURE — 92015 DETERMINE REFRACTIVE STATE: CPT | Performed by: OPHTHALMOLOGY

## 2017-06-08 NOTE — PATIENT INSTRUCTIONS
Using a Blood Sugar Log    You have diabetes. This means your body has trouble regulating a sugar called glucose. To help manage your diabetes, you’ll need to check your blood sugar level as directed by your healthcare provider.  Keeping a log of your blo Tracking your blood sugar readings helps you see patterns. These patterns tell you how your actions affect your blood sugar. For instance, you may have higher numbers after eating certain foods or lower numbers after exercise.  They just help you understand What you may notice  If you have low blood sugar, you may have one or more of these symptoms:  · Shakiness or dizziness  · Cold, clammy skin or sweating  · Feelings of hunger  · Headache  · Nervousness  · A hard, fast heartbeat  · Weakness  · Confusion or · Carry a medical ID card, a compact USB drive, or wear a medical alert bracelet or necklace. It should say that you have diabetes. It should also say what to do if you pass out or have a seizure.   · Make sure your family, friends, and coworkers know the s

## 2017-06-08 NOTE — PROGRESS NOTES
Diabetic Annual Assessment Clinical Note    /88 mmHg    Foot Assessment    Visual Inspection of the feet completed at appointment.     · Skin Abnormal, dry  · Callous Normal  · Redness Normal  · Swelling Normal  · Hair Growth Abnormal, absent  · Nails

## 2017-06-08 NOTE — PATIENT INSTRUCTIONS
Retinal detachment- right eye  Discussed diagnosis and treatment in detail with patient. Refer to Dr. Camila Patel for evaluation and treatment. Appointment was scheduled with Dr. Camila Patel today right now at in the [place] location.    Referral was requeste

## 2017-06-08 NOTE — ASSESSMENT & PLAN NOTE
Discussed diagnosis and treatment in detail with patient. Refer to Dr. Vasquez Arreguin for evaluation and treatment. Appointment was scheduled with Dr. Vasquez Arreguin today right now at in the [place] location. Referral was requested.

## 2017-06-08 NOTE — PROGRESS NOTES
Rocio Ricci is a 39year old female who presents for a pre-operative physical exam. Patient is to have  Scleral buckle of the right eye   Pt has had previous anesthesia:  Yes.   Previous complications:  No  Patient presents with:  Pre-Op Exam: Scleral B Number of children:               Social History Main Topics    Smoking Status: Never Smoker                      Alcohol Use: Yes                Comment: social-rare    Drug Use: No                  REVIEW OF SYSTEMS:   GENERAL: feels well otherwise  SKIN is to have scleral buckle  Of right eye by dr Antonio Robledo    Perioperative Medical Risk Evaluation    1. Cardiac Risk  Denies any chest pain, no sob , no palpitation. Dibetes is good controlled   Ekg ;sinus rhythm at 66 bpm, no st t changes    2.  Pulmona

## 2017-06-08 NOTE — PROGRESS NOTES
Augustine Meraz is a 39year old female.     HPI:     HPI     Consult    Additional comments: Referred by Dr. Walter Galvan           Diabetic Eye Exam    Additional comments: Pt has been a diabetic for 4 years  0 years on pills/  4 years on Insulin   Pt checks insulin detemir 100 UNIT/ML Subcutaneous Solution Pen-injector Inject 42 Units into the skin daily. Disp: 15 mL Rfl: 3   Pravastatin Sodium 40 MG Oral Tab Take 1 tablet (40 mg total) by mouth nightly.  Disp: 30 tablet Rfl: 6   Insulin Pen Needle (PEN NEED Normal- no BDR Normal- no BDR    Vessels Normal Normal    Periphery 4 holes superotemp with RD superotemp and temp  Normal            Refraction     Wearing Rx      Sphere Cylinder Axis   Right -7.75 +0.00 000   Left -7.00 +0.00 000       Type:  Single vis

## 2017-06-19 NOTE — TELEPHONE ENCOUNTER
From: Danielle Nieto  To:  Dandre John MD  Sent: 6/19/2017 3:18 PM CDT  Subject: Medication Renewal Request    Original authorizing provider: MD Danielle Pichardo would like a refill of the following medications:  insulin detemir 100 UNIT/M

## 2017-07-27 ENCOUNTER — TELEPHONE (OUTPATIENT)
Dept: INTERNAL MEDICINE CLINIC | Facility: CLINIC | Age: 37
End: 2017-07-27

## 2017-07-27 ENCOUNTER — TELEPHONE (OUTPATIENT)
Dept: OPHTHALMOLOGY | Facility: CLINIC | Age: 37
End: 2017-07-27

## 2017-07-27 DIAGNOSIS — Z79.4 TYPE 2 DIABETES MELLITUS WITHOUT COMPLICATION, WITH LONG-TERM CURRENT USE OF INSULIN (HCC): Primary | ICD-10-CM

## 2017-07-27 DIAGNOSIS — E11.9 TYPE 2 DIABETES MELLITUS WITHOUT COMPLICATION, WITH LONG-TERM CURRENT USE OF INSULIN (HCC): Primary | ICD-10-CM

## 2017-07-27 NOTE — TELEPHONE ENCOUNTER
Spoke with patient. She had scleral buckle surgery in the right eye and now Dr. Davion Sifuentes is planning on doing surgery on the left eye. The patient is concerned about having surgery in the left eye because the vision is still blurry in the left eye.

## 2017-07-27 NOTE — TELEPHONE ENCOUNTER
Call to [facility]. No answer. Left voice message. REquesting call back Did inquire if it is a well check up or if having eye issues.   Tasking to Leonor Erickson

## 2017-08-03 ENCOUNTER — OFFICE VISIT (OUTPATIENT)
Dept: ENDOCRINOLOGY CLINIC | Facility: CLINIC | Age: 37
End: 2017-08-03

## 2017-08-03 VITALS
BODY MASS INDEX: 45.99 KG/M2 | HEIGHT: 67 IN | WEIGHT: 293 LBS | DIASTOLIC BLOOD PRESSURE: 94 MMHG | SYSTOLIC BLOOD PRESSURE: 139 MMHG | HEART RATE: 73 BPM

## 2017-08-03 DIAGNOSIS — Z79.4 UNCONTROLLED TYPE 2 DIABETES MELLITUS WITH COMPLICATION, WITH LONG-TERM CURRENT USE OF INSULIN (HCC): Primary | ICD-10-CM

## 2017-08-03 DIAGNOSIS — E11.65 UNCONTROLLED TYPE 2 DIABETES MELLITUS WITH COMPLICATION, WITH LONG-TERM CURRENT USE OF INSULIN (HCC): Primary | ICD-10-CM

## 2017-08-03 DIAGNOSIS — E11.8 UNCONTROLLED TYPE 2 DIABETES MELLITUS WITH COMPLICATION, WITH LONG-TERM CURRENT USE OF INSULIN (HCC): Primary | ICD-10-CM

## 2017-08-03 LAB
CARTRIDGE LOT#: ABNORMAL NUMERIC
GLUCOSE BLOOD: 88
HEMOGLOBIN A1C: 6.3 % (ref 4.3–5.6)
TEST STRIP LOT #: NORMAL NUMERIC

## 2017-08-03 PROCEDURE — 99213 OFFICE O/P EST LOW 20 MIN: CPT | Performed by: INTERNAL MEDICINE

## 2017-08-03 PROCEDURE — 36416 COLLJ CAPILLARY BLOOD SPEC: CPT | Performed by: INTERNAL MEDICINE

## 2017-08-03 PROCEDURE — 83036 HEMOGLOBIN GLYCOSYLATED A1C: CPT | Performed by: INTERNAL MEDICINE

## 2017-08-03 PROCEDURE — 82962 GLUCOSE BLOOD TEST: CPT | Performed by: INTERNAL MEDICINE

## 2017-08-03 PROCEDURE — 99212 OFFICE O/P EST SF 10 MIN: CPT | Performed by: INTERNAL MEDICINE

## 2017-08-03 NOTE — PROGRESS NOTES
Name: Charmayne Manis  Date: 8/3/2017    Referring Physician: Vita Valencia    HISTORY OF PRESENT ILLNESS   Charmayne Manis is a 40year old female who presents for diabetes mellitus, diagnosed over 10 years ago.       Prior HbA, C or glycohemoglobin w Disp: 200 each, Rfl: 6     Allergies:     Clindamycin               Doxycycline             Itching  Sulfa Antibiotics         Vancomycin              Itching    Social History:   Social History    Marital status: Single              Spouse name: place  Nutritional:  no abnormal weight gain or loss    ASSESSMENT/PLAN:      1.  Diabetes Mellitus Type 2, controlled  -controlled, HgA1c 6.7%   -Given well controlled BG levels on current regimen, will hold off on transition to oral therapy  -Discussed im

## 2017-09-15 VITALS — HEIGHT: 67 IN | WEIGHT: 293 LBS | BODY MASS INDEX: 45.99 KG/M2

## 2017-09-18 ENCOUNTER — HOSPITAL ENCOUNTER (OUTPATIENT)
Dept: INTERVENTIONAL RADIOLOGY/VASCULAR | Facility: HOSPITAL | Age: 37
Discharge: HOME | End: 2017-09-18
Attending: RADIOLOGY | Admitting: RADIOLOGY
Payer: COMMERCIAL

## 2017-09-18 DIAGNOSIS — M79.89 LEG SWELLING: ICD-10-CM

## 2017-09-18 PROCEDURE — 3E033TZ INTRODUCTION OF DESTRUCTIVE AGENT INTO PERIPHERAL VEIN, PERCUTANEOUS APPROACH: ICD-10-PCS | Performed by: RADIOLOGY

## 2017-09-18 PROCEDURE — 76940 US GUIDE TISSUE ABLATION: CPT

## 2017-09-18 PROCEDURE — 36471 NJX SCLRSNT MLT INCMPTNT VN: CPT

## 2017-09-18 RX ORDER — SODIUM TETRADECYL SULFATE 30 MG/ML
INJECTION, SOLUTION INTRAVENOUS
Status: COMPLETED
Start: 2017-09-18 | End: 2017-09-18

## 2017-09-18 RX ORDER — LIDOCAINE HYDROCHLORIDE 20 MG/ML
INJECTION, SOLUTION EPIDURAL; INFILTRATION; INTRACAUDAL; PERINEURAL
Status: COMPLETED
Start: 2017-09-18 | End: 2017-09-18

## 2017-09-18 NOTE — H&P
[address] Patient Status:  Outpatient in a Bed    1980 MRN [id number]   Location [facility] Attending No att. providers found   Hosp Day # 0 PCP  mL, Rfl: 2  •  Blood Glucose Monitoring Suppl ([facility]) w/Device Does not apply Kit, by Does not apply route. Test blood sugar TID, Disp: , Rfl:   •  FLUoxetine HCl 40 MG Oral Cap, Take 1 capsule (40 mg total) by mouth daily. , Disp: 30

## 2017-09-18 NOTE — PROCEDURES
[facility] HOSP - [facility]  Procedure Note    Lestine Benedict Patient Status:  Outpatient in a Bed    1980 MRN [id number]   Location [facility] Attending Shaheen Martinez MD   Hosp Day # 0 PCP Bryan Flores MD

## 2017-10-24 RX ORDER — FLUOXETINE HYDROCHLORIDE 40 MG/1
CAPSULE ORAL
Qty: 30 CAPSULE | Refills: 0 | Status: SHIPPED | OUTPATIENT
Start: 2017-10-24 | End: 2017-12-10

## 2017-11-21 ENCOUNTER — OFFICE VISIT (OUTPATIENT)
Dept: INTERNAL MEDICINE CLINIC | Facility: CLINIC | Age: 37
End: 2017-11-21

## 2017-11-21 ENCOUNTER — HOSPITAL ENCOUNTER (OUTPATIENT)
Dept: ULTRASOUND IMAGING | Facility: HOSPITAL | Age: 37
Discharge: HOME | End: 2017-11-21
Attending: INTERNAL MEDICINE
Payer: COMMERCIAL

## 2017-11-21 VITALS
SYSTOLIC BLOOD PRESSURE: 137 MMHG | BODY MASS INDEX: 45.99 KG/M2 | HEIGHT: 67 IN | DIASTOLIC BLOOD PRESSURE: 88 MMHG | HEART RATE: 86 BPM | WEIGHT: 293 LBS | TEMPERATURE: 99 F

## 2017-11-21 DIAGNOSIS — M79.89 RIGHT LEG SWELLING: Primary | ICD-10-CM

## 2017-11-21 DIAGNOSIS — M79.604 PAIN OF RIGHT LOWER EXTREMITY: ICD-10-CM

## 2017-11-21 DIAGNOSIS — B34.9 VIRAL SYNDROME: ICD-10-CM

## 2017-11-21 DIAGNOSIS — M79.89 RIGHT LEG SWELLING: ICD-10-CM

## 2017-11-21 PROCEDURE — 93971 EXTREMITY STUDY: CPT | Performed by: INTERNAL MEDICINE

## 2017-11-21 PROCEDURE — 99214 OFFICE O/P EST MOD 30 MIN: CPT | Performed by: INTERNAL MEDICINE

## 2017-11-21 PROCEDURE — 99212 OFFICE O/P EST SF 10 MIN: CPT | Performed by: INTERNAL MEDICINE

## 2017-11-24 NOTE — PROGRESS NOTES
HPI:    Patient ID: Bryant Irving is a 40year old female. HPI  Pt comes in with complaint of fever body pain over the weekend. Pt also complaints of rt leg pain and swelling. She noticed this the day before. No redness.      Review of Systems   Consti R* Right      Comment: Scleral buckle OD (SB, cryo, drainage of SRF)                for macula off retinal detachment- Dr. Shane Palacio  No date: TONSILLECTOMY   Family History   Problem Relation Age of Onset   • Diabetes Father    • Hypertension Mother    • G

## 2017-11-24 NOTE — PATIENT INSTRUCTIONS
ASSESSMENT/PLAN:   Right leg swelling  (primary encounter diagnosis)- ?, will need to r/o DVT, will order US, hold and call  Pain of right lower extremity- due to above, but if negative e than most likely phlebitis or muscle strain, as need med for pain, k

## 2017-12-11 RX ORDER — FLUOXETINE HYDROCHLORIDE 40 MG/1
CAPSULE ORAL
Qty: 30 CAPSULE | Refills: 0 | Status: SHIPPED | OUTPATIENT
Start: 2017-12-11 | End: 2018-01-24

## 2017-12-11 NOTE — TELEPHONE ENCOUNTER
Refill Protocol Appointment Criteria  · Appointment scheduled in the past 6 months or in the next 3 months  Recent Outpatient Visits            2 weeks ago Right leg swelling    Don Sandifer, MD    Office Visit    4 mon

## 2018-01-24 ENCOUNTER — TELEPHONE (OUTPATIENT)
Dept: INTERNAL MEDICINE CLINIC | Facility: CLINIC | Age: 38
End: 2018-01-24

## 2018-01-24 DIAGNOSIS — E11.65 UNCONTROLLED TYPE 2 DIABETES MELLITUS WITH COMPLICATION, WITH LONG-TERM CURRENT USE OF INSULIN (HCC): Primary | ICD-10-CM

## 2018-01-24 DIAGNOSIS — Z79.4 UNCONTROLLED TYPE 2 DIABETES MELLITUS WITH COMPLICATION, WITH LONG-TERM CURRENT USE OF INSULIN (HCC): Primary | ICD-10-CM

## 2018-01-24 DIAGNOSIS — E11.8 UNCONTROLLED TYPE 2 DIABETES MELLITUS WITH COMPLICATION, WITH LONG-TERM CURRENT USE OF INSULIN (HCC): Primary | ICD-10-CM

## 2018-01-24 RX ORDER — FLUOXETINE HYDROCHLORIDE 40 MG/1
CAPSULE ORAL
Qty: 30 CAPSULE | Refills: 0 | Status: SHIPPED | OUTPATIENT
Start: 2018-01-24 | End: 2018-03-07

## 2018-01-24 RX ORDER — INSULIN DETEMIR 100 [IU]/ML
INJECTION, SOLUTION SUBCUTANEOUS
Qty: 15 ML | Refills: 0 | Status: SHIPPED | OUTPATIENT
Start: 2018-01-24 | End: 2018-04-19

## 2018-01-24 RX ORDER — INSULIN ASPART 100 [IU]/ML
INJECTION, SOLUTION INTRAVENOUS; SUBCUTANEOUS
Qty: 15 ML | Refills: 0 | Status: SHIPPED | OUTPATIENT
Start: 2018-01-24 | End: 2018-02-27

## 2018-01-24 NOTE — TELEPHONE ENCOUNTER
Patient requesting referral for 3 month follow up with Dr John Bowens patient has appointment tomorrow 1:45 pm.

## 2018-01-24 NOTE — TELEPHONE ENCOUNTER
LOV 8/3/17 -No F/U    Filled per [facility] protocol. RN called patient to assist in scheduling apt     Patient reports she only has Sundays off and she is unable to make an appointment. Patient hung up.

## 2018-01-25 ENCOUNTER — OFFICE VISIT (OUTPATIENT)
Dept: ENDOCRINOLOGY CLINIC | Facility: CLINIC | Age: 38
End: 2018-01-25

## 2018-01-25 VITALS
HEART RATE: 94 BPM | BODY MASS INDEX: 45.99 KG/M2 | SYSTOLIC BLOOD PRESSURE: 122 MMHG | HEIGHT: 67 IN | DIASTOLIC BLOOD PRESSURE: 78 MMHG | WEIGHT: 293 LBS

## 2018-01-25 DIAGNOSIS — Z79.4 CONTROLLED TYPE 2 DIABETES MELLITUS WITH HYPERGLYCEMIA, WITH LONG-TERM CURRENT USE OF INSULIN (HCC): Primary | ICD-10-CM

## 2018-01-25 DIAGNOSIS — E11.65 CONTROLLED TYPE 2 DIABETES MELLITUS WITH HYPERGLYCEMIA, WITH LONG-TERM CURRENT USE OF INSULIN (HCC): Primary | ICD-10-CM

## 2018-01-25 LAB
GLUCOSE BLOOD: 185
TEST STRIP LOT #: NORMAL NUMERIC

## 2018-01-25 PROCEDURE — 99214 OFFICE O/P EST MOD 30 MIN: CPT | Performed by: INTERNAL MEDICINE

## 2018-01-25 PROCEDURE — 36416 COLLJ CAPILLARY BLOOD SPEC: CPT | Performed by: INTERNAL MEDICINE

## 2018-01-25 PROCEDURE — 82962 GLUCOSE BLOOD TEST: CPT | Performed by: INTERNAL MEDICINE

## 2018-01-25 PROCEDURE — 99212 OFFICE O/P EST SF 10 MIN: CPT | Performed by: INTERNAL MEDICINE

## 2018-01-25 RX ORDER — CHLORAL HYDRATE 500 MG
1000 CAPSULE ORAL DAILY
COMMUNITY
End: 2018-12-17

## 2018-01-25 RX ORDER — FLUCONAZOLE 150 MG/1
150 TABLET ORAL ONCE
Qty: 1 TABLET | Refills: 1 | Status: SHIPPED | OUTPATIENT
Start: 2018-01-25 | End: 2018-01-25

## 2018-01-25 RX ORDER — PRAVASTATIN SODIUM 40 MG
TABLET ORAL
Qty: 30 TABLET | Refills: 0 | Status: SHIPPED | OUTPATIENT
Start: 2018-01-25 | End: 2018-03-07

## 2018-01-25 NOTE — PATIENT INSTRUCTIONS
Continue Levemir 42 units SQ daily    Stop Novolog    Start Victoza 0.6mg SQ daily for one week then increase to 1.2mg SQ daily    Start Invokana 300mg daily

## 2018-01-25 NOTE — PROGRESS NOTES
Name: Telma Lubin  Date: 1/25/2018    Referring Physician: Ketty Parra    HISTORY OF PRESENT ILLNESS   Telma Lubin is a 40year old female who presents for diabetes mellitus, diagnosed over 10 years ago.       Prior HbA, C or glycohemoglobin w/Device Does not apply Kit, by Does not apply route.  Test blood sugar TID, Disp: , Rfl:   •  Insulin Pen Needle (PEN NEEDLES 5/16\") 31G X 8 MM Does not apply Misc, For use with insulin pen, up to 5 injections daily, Disp: 200 each, Rfl: 6     Allergies: Musculoskeletal:  normal muscle strength and tone  Skin:  normal moisture and skin texture  Hair & Nails:  normal scalp hair     Hematologic:  no excessive bruising  Neuro:  sensory grossly intact and motor grossly intact  Psychiatric:  oriented to time,

## 2018-01-31 ENCOUNTER — TELEPHONE (OUTPATIENT)
Dept: ENDOCRINOLOGY CLINIC | Facility: CLINIC | Age: 38
End: 2018-01-31

## 2018-01-31 NOTE — TELEPHONE ENCOUNTER
Current Outpatient Prescriptions:  Liraglutide (VICTOZA) 18 MG/3ML Subcutaneous Solution Pen-injector Inject 1.2 mg into the skin daily.  Disp: 6 mL Rfl: 3     PA request call [phone] PT ID# [id number]

## 2018-02-01 NOTE — TELEPHONE ENCOUNTER
Victoza has been approved from 1/25/18-1/25/19. Called pharmacy and requested script to be filled. Called pt to make aware.

## 2018-02-11 ENCOUNTER — PATIENT MESSAGE (OUTPATIENT)
Dept: ENDOCRINOLOGY CLINIC | Facility: CLINIC | Age: 38
End: 2018-02-11

## 2018-02-12 RX ORDER — FLUCONAZOLE 150 MG/1
150 TABLET ORAL ONCE
Qty: 2 TABLET | Refills: 1 | Status: SHIPPED | OUTPATIENT
Start: 2018-02-12 | End: 2018-02-12

## 2018-02-12 NOTE — TELEPHONE ENCOUNTER
From: Vic Hoivers  To: Bradley Schaumann, MD  Sent: 2/11/2018 12:03 PM CST  Subject: Prescription Question    I just stared taking victoza and invokana. I'm very worried about yeast infections as they were an issue previously tho not in at least a year.  Author Lamont

## 2018-02-12 NOTE — TELEPHONE ENCOUNTER
Ok to send script for fluconazole 150mg #2, refill 1. Thanks. Please let her know to take tablets 2 days apart.

## 2018-02-27 ENCOUNTER — OFFICE VISIT (OUTPATIENT)
Dept: INTERNAL MEDICINE CLINIC | Facility: CLINIC | Age: 38
End: 2018-02-27

## 2018-02-27 VITALS
SYSTOLIC BLOOD PRESSURE: 138 MMHG | DIASTOLIC BLOOD PRESSURE: 90 MMHG | TEMPERATURE: 99 F | HEART RATE: 101 BPM | BODY MASS INDEX: 50 KG/M2 | OXYGEN SATURATION: 98 % | WEIGHT: 293 LBS

## 2018-02-27 DIAGNOSIS — E11.8 UNCONTROLLED TYPE 2 DIABETES MELLITUS WITH COMPLICATION, WITH LONG-TERM CURRENT USE OF INSULIN (HCC): ICD-10-CM

## 2018-02-27 DIAGNOSIS — E66.01 MORBID OBESITY (HCC): ICD-10-CM

## 2018-02-27 DIAGNOSIS — E11.65 UNCONTROLLED TYPE 2 DIABETES MELLITUS WITH COMPLICATION, WITH LONG-TERM CURRENT USE OF INSULIN (HCC): ICD-10-CM

## 2018-02-27 DIAGNOSIS — Z79.4 UNCONTROLLED TYPE 2 DIABETES MELLITUS WITH COMPLICATION, WITH LONG-TERM CURRENT USE OF INSULIN (HCC): ICD-10-CM

## 2018-02-27 DIAGNOSIS — Z01.810 PRE-OPERATIVE CARDIOVASCULAR EXAMINATION: Primary | ICD-10-CM

## 2018-02-27 LAB
ALBUMIN SERPL BCP-MCNC: 4 G/DL (ref 3.5–4.8)
ALBUMIN/GLOB SERPL: 1.3 {RATIO} (ref 1–2)
ALP SERPL-CCNC: 39 U/L (ref 32–100)
ALT SERPL-CCNC: 17 U/L (ref 14–54)
ANION GAP SERPL CALC-SCNC: 8 MMOL/L (ref 0–18)
AST SERPL-CCNC: 15 U/L (ref 15–41)
BASOPHILS # BLD: 0 K/UL (ref 0–0.2)
BASOPHILS NFR BLD: 1 %
BILIRUB SERPL-MCNC: 0.5 MG/DL (ref 0.3–1.2)
BUN SERPL-MCNC: 13 MG/DL (ref 8–20)
BUN/CREAT SERPL: 15.3 (ref 10–20)
CALCIUM SERPL-MCNC: 8.8 MG/DL (ref 8.5–10.5)
CHLORIDE SERPL-SCNC: 102 MMOL/L (ref 95–110)
CHOLEST SERPL-MCNC: 166 MG/DL (ref 110–200)
CO2 SERPL-SCNC: 24 MMOL/L (ref 22–32)
CREAT SERPL-MCNC: 0.85 MG/DL (ref 0.5–1.5)
CREAT UR-MCNC: 63.4 MG/DL
EOSINOPHIL # BLD: 0.1 K/UL (ref 0–0.7)
EOSINOPHIL NFR BLD: 2 %
ERYTHROCYTE [DISTWIDTH] IN BLOOD BY AUTOMATED COUNT: 12.5 % (ref 11–15)
GLOBULIN PLAS-MCNC: 3 G/DL (ref 2.5–3.7)
GLUCOSE SERPL-MCNC: 123 MG/DL (ref 70–99)
HCT VFR BLD AUTO: 40.5 % (ref 35–48)
HDLC SERPL-MCNC: 54 MG/DL
HGB BLD-MCNC: 13.7 G/DL (ref 12–16)
LDLC SERPL CALC-MCNC: 86 MG/DL (ref 0–99)
LYMPHOCYTES # BLD: 2.4 K/UL (ref 1–4)
LYMPHOCYTES NFR BLD: 28 %
MCH RBC QN AUTO: 29.7 PG (ref 27–32)
MCHC RBC AUTO-ENTMCNC: 33.8 G/DL (ref 32–37)
MCV RBC AUTO: 88 FL (ref 80–100)
MICROALBUMIN UR-MCNC: 0.2 MG/DL (ref 0–1.8)
MICROALBUMIN/CREAT UR: 3.2 MG/G{CREAT} (ref 0–20)
MONOCYTES # BLD: 0.6 K/UL (ref 0–1)
MONOCYTES NFR BLD: 7 %
NEUTROPHILS # BLD AUTO: 5.4 K/UL (ref 1.8–7.7)
NEUTROPHILS NFR BLD: 63 %
NONHDLC SERPL-MCNC: 112 MG/DL
OSMOLALITY UR CALC.SUM OF ELEC: 279 MOSM/KG (ref 275–295)
PATIENT FASTING: YES
PLATELET # BLD AUTO: 227 K/UL (ref 140–400)
PMV BLD AUTO: 8.1 FL (ref 7.4–10.3)
POTASSIUM SERPL-SCNC: 3.6 MMOL/L (ref 3.3–5.1)
PROT SERPL-MCNC: 7 G/DL (ref 5.9–8.4)
RBC # BLD AUTO: 4.61 M/UL (ref 3.7–5.4)
SODIUM SERPL-SCNC: 134 MMOL/L (ref 136–144)
TRIGL SERPL-MCNC: 132 MG/DL (ref 1–149)
TSH SERPL-ACNC: 1.74 UIU/ML (ref 0.45–5.33)
WBC # BLD AUTO: 8.6 K/UL (ref 4–11)

## 2018-02-27 PROCEDURE — 99212 OFFICE O/P EST SF 10 MIN: CPT | Performed by: INTERNAL MEDICINE

## 2018-02-27 PROCEDURE — 99214 OFFICE O/P EST MOD 30 MIN: CPT | Performed by: INTERNAL MEDICINE

## 2018-02-27 PROCEDURE — 36415 COLL VENOUS BLD VENIPUNCTURE: CPT | Performed by: INTERNAL MEDICINE

## 2018-02-27 NOTE — PATIENT INSTRUCTIONS
ASSESSMENT/PLAN:   Diagnoses and all orders for this visit:    Pre-operative cardiovascular examination  -     CBC WITH DIFFERENTIAL WITH PLATELET; Future  Patient medically stable for her surgery.  Needs no additional testing other than CBC requested

## 2018-02-27 NOTE — PROGRESS NOTES
HPI:    Patient ID: Maria Queen is a 40year old female. HPI  Pre-op   Patient has scleral buckle on the left, needs surgery. Had similar surgery on the right in the past. Seeing Dr Camila Patel and has no evidence of diabetes.  She is following with  route. Test blood sugar TID Disp:  Rfl:    Insulin Pen Needle (PEN NEEDLES 5/16\") 31G X 8 MM Does not apply Misc For use with insulin pen, up to 5 injections daily Disp: 200 each Rfl: 6     Allergies:  Clindamycin               Doxycycline             Itc Future  Patient medically stable for her surgery.  Needs no additional testing other than CBC requested by surgeon  Uncontrolled type 2 diabetes mellitus with complication, with long-term current use of insulin ([facility] [address]  Recently changed her regimen, due for l

## 2018-03-07 RX ORDER — PRAVASTATIN SODIUM 40 MG
TABLET ORAL
Qty: 30 TABLET | Refills: 3 | Status: SHIPPED | OUTPATIENT
Start: 2018-03-07 | End: 2018-12-17

## 2018-03-07 RX ORDER — FLUOXETINE HYDROCHLORIDE 40 MG/1
CAPSULE ORAL
Qty: 30 CAPSULE | Refills: 3 | Status: SHIPPED | OUTPATIENT
Start: 2018-03-07 | End: 2018-10-04

## 2018-04-19 RX ORDER — INSULIN DETEMIR 100 [IU]/ML
INJECTION, SOLUTION SUBCUTANEOUS
Qty: 15 ML | Refills: 3 | Status: SHIPPED | OUTPATIENT
Start: 2018-04-19 | End: 2018-06-02

## 2018-04-20 ENCOUNTER — OFFICE VISIT (OUTPATIENT)
Dept: SURGERY | Facility: CLINIC | Age: 38
End: 2018-04-20

## 2018-04-20 VITALS
SYSTOLIC BLOOD PRESSURE: 152 MMHG | RESPIRATION RATE: 16 BRPM | DIASTOLIC BLOOD PRESSURE: 96 MMHG | HEART RATE: 83 BPM | HEIGHT: 67 IN | WEIGHT: 293 LBS | BODY MASS INDEX: 45.99 KG/M2 | OXYGEN SATURATION: 97 %

## 2018-04-20 DIAGNOSIS — IMO0001 IDDM (INSULIN DEPENDENT DIABETES MELLITUS): Primary | ICD-10-CM

## 2018-04-20 DIAGNOSIS — E66.01 MORBID OBESITY WITH BMI OF 50.0-59.9, ADULT (HCC): ICD-10-CM

## 2018-04-20 DIAGNOSIS — E78.00 HIGH CHOLESTEROL: ICD-10-CM

## 2018-04-20 DIAGNOSIS — F50.81 BINGE EATING DISORDER: ICD-10-CM

## 2018-04-20 DIAGNOSIS — R53.82 CHRONIC FATIGUE: ICD-10-CM

## 2018-04-20 PROBLEM — F50.819 BINGE EATING DISORDER: Status: ACTIVE | Noted: 2018-04-20

## 2018-04-20 PROCEDURE — 99204 OFFICE O/P NEW MOD 45 MIN: CPT | Performed by: INTERNAL MEDICINE

## 2018-04-20 NOTE — PROGRESS NOTES
[facility] Consultation Note       Date of Consult:  2018    Patient:  Tha Delcid  :      1980  MRN:      [id number]    Referring Provider: Dr. Abel Olson       Chief Complaint:  Patient presents with:  Consult  Weight M Magnesium 100 MG Oral Tab Take by mouth. Disp:  Rfl:    omega-3 fatty acids 1000 MG Oral Cap Take 1,000 mg by mouth daily. Disp:  Rfl:    Liraglutide (VICTOZA) 18 MG/3ML Subcutaneous Solution Pen-injector Inject 1.2 mg into the skin daily.  Disp: 6 mL Rfl tooth  +portion sizes  Will binge eating chocolate    Soda Drinker?: Yes  If yes, how much?:  Diet soda    Number of restaurant or fast food meals/week:  4 meals/week    Nutritional Goals Reviewed and Discussed:     Limit carbohydrates to 100 gms per day, encounter diagnosis)  Binge eating disorder  Chronic fatigue  High cholesterol  Morbid obesity with BMI of 50.0-59.9, adult (HCC)    PLAN     Given the patient's age, degree of obesity and multiple medical problems outlined above, I do believe that osei

## 2018-04-26 ENCOUNTER — PATIENT MESSAGE (OUTPATIENT)
Dept: ENDOCRINOLOGY CLINIC | Facility: CLINIC | Age: 38
End: 2018-04-26

## 2018-04-26 NOTE — TELEPHONE ENCOUNTER
No labs needed. Pt is due for f/u apt. Replied to patient via mychart to please schedule f/u apt and no labs needed at this time.

## 2018-04-26 NOTE — TELEPHONE ENCOUNTER
From: Nelson Diop  To: Chloe Escobar MD  Sent: 4/26/2018 10:58 AM CDT  Subject: Non-Urgent Medical Question    I received a letter in [address] that I had not done some tests that your office gave me an order for.  Can you remind me of what the test is, a

## 2018-05-20 ENCOUNTER — APPOINTMENT (OUTPATIENT)
Dept: ULTRASOUND IMAGING | Facility: HOSPITAL | Age: 38
End: 2018-05-20
Attending: EMERGENCY MEDICINE
Payer: COMMERCIAL

## 2018-05-20 ENCOUNTER — HOSPITAL ENCOUNTER (EMERGENCY)
Facility: HOSPITAL | Age: 38
Discharge: HOME | End: 2018-05-20
Attending: EMERGENCY MEDICINE
Payer: COMMERCIAL

## 2018-05-20 VITALS
DIASTOLIC BLOOD PRESSURE: 79 MMHG | SYSTOLIC BLOOD PRESSURE: 128 MMHG | RESPIRATION RATE: 14 BRPM | TEMPERATURE: 99 F | HEART RATE: 82 BPM | OXYGEN SATURATION: 98 %

## 2018-05-20 DIAGNOSIS — L03.115 CELLULITIS OF RIGHT LOWER EXTREMITY: Primary | ICD-10-CM

## 2018-05-20 PROCEDURE — 93971 EXTREMITY STUDY: CPT | Performed by: EMERGENCY MEDICINE

## 2018-05-20 PROCEDURE — 96365 THER/PROPH/DIAG IV INF INIT: CPT

## 2018-05-20 PROCEDURE — 85025 COMPLETE CBC W/AUTO DIFF WBC: CPT | Performed by: EMERGENCY MEDICINE

## 2018-05-20 PROCEDURE — 99284 EMERGENCY DEPT VISIT MOD MDM: CPT

## 2018-05-20 PROCEDURE — 80048 BASIC METABOLIC PNL TOTAL CA: CPT | Performed by: EMERGENCY MEDICINE

## 2018-05-20 RX ORDER — AMOXICILLIN AND CLAVULANATE POTASSIUM 875; 125 MG/1; MG/1
1 TABLET, FILM COATED ORAL 2 TIMES DAILY
Qty: 20 TABLET | Refills: 0 | Status: SHIPPED | OUTPATIENT
Start: 2018-05-20 | End: 2018-05-30

## 2018-05-20 NOTE — ED PROVIDER NOTES
Patient Seen in: [facility] AND [facility] Emergency Department    History   Patient presents with:  Rash Skin Problem (integumentary)    Stated Complaint: right leg cellulitis concern    HPI    The patient is a 28-year-old female with history of diabetes who pr Constitutional: She is oriented to person, place, and time. She appears well-developed and well-nourished. Obese   HENT:   Head: Normocephalic and atraumatic.    Mouth/Throat: Oropharynx is clear and moist.   Eyes: Conjunctivae are normal.   Neck: Normal IV Unasyn given in the ED          Disposition and Plan     Clinical Impression:  Cellulitis of right lower extremity  (primary encounter diagnosis)    Disposition:  Discharge  5/20/2018  3:16 pm    Follow-up:  Carrie Kothari MD  [address]

## 2018-05-20 NOTE — ED NOTES
Care assumed from triage Alert and interactive Present with 3 day hx progressive induration to R lateral calf Known hx of cellulitis with inpatient admissions and wound vac requirement Current area was delineated yesterday by pt with extension beyond [facility]

## 2018-05-21 ENCOUNTER — OFFICE VISIT (OUTPATIENT)
Dept: INTERNAL MEDICINE CLINIC | Facility: CLINIC | Age: 38
End: 2018-05-21

## 2018-05-21 VITALS
SYSTOLIC BLOOD PRESSURE: 111 MMHG | OXYGEN SATURATION: 99 % | WEIGHT: 293 LBS | TEMPERATURE: 98 F | HEART RATE: 82 BPM | HEIGHT: 67 IN | BODY MASS INDEX: 45.99 KG/M2 | DIASTOLIC BLOOD PRESSURE: 79 MMHG

## 2018-05-21 DIAGNOSIS — L03.115 CELLULITIS OF RIGHT LOWER EXTREMITY: Primary | ICD-10-CM

## 2018-05-21 PROCEDURE — 99212 OFFICE O/P EST SF 10 MIN: CPT | Performed by: INTERNAL MEDICINE

## 2018-05-21 PROCEDURE — 99213 OFFICE O/P EST LOW 20 MIN: CPT | Performed by: INTERNAL MEDICINE

## 2018-05-21 RX ORDER — IBUPROFEN 200 MG
200 TABLET ORAL EVERY 6 HOURS PRN
COMMUNITY
End: 2018-12-17

## 2018-05-21 NOTE — PROGRESS NOTES
HPI:    Patient ID: Shelly Jeong is a 40year old female. HPI  Cellulitis  Patient in ER yesterday for redness to her leg. Had small spot, then it progressed. Went to ER and given IV med and then also augmentin. She had US and no clot seen.      Kishore Doxycycline             ITCHING  Sulfa Antibiotics         Vancomycin              ITCHING  PHYSICAL EXAM:    Physical Exam   Vitals reviewed. Constitutional: She is obese. She appears well-developed and well-nourished.    Musculoskeletal:   Mildly tend

## 2018-05-21 NOTE — PATIENT INSTRUCTIONS
ASSESSMENT/PLAN:   Chanel Pruettmarco was seen today for cellulitis (integumentary, infectious) and er f/u. Diagnoses and all orders for this visit:    Cellulitis of right lower extremity    Patient with improving cellulitis.  Should continue the augmentin until mar

## 2018-05-25 ENCOUNTER — OFFICE VISIT (OUTPATIENT)
Dept: SURGERY | Facility: CLINIC | Age: 38
End: 2018-05-25

## 2018-05-25 VITALS
BODY MASS INDEX: 45.99 KG/M2 | SYSTOLIC BLOOD PRESSURE: 134 MMHG | HEART RATE: 95 BPM | HEIGHT: 67 IN | RESPIRATION RATE: 18 BRPM | DIASTOLIC BLOOD PRESSURE: 90 MMHG | WEIGHT: 293 LBS | OXYGEN SATURATION: 99 %

## 2018-05-25 DIAGNOSIS — IMO0001 IDDM (INSULIN DEPENDENT DIABETES MELLITUS): ICD-10-CM

## 2018-05-25 DIAGNOSIS — E66.01 MORBID OBESITY WITH BMI OF 50.0-59.9, ADULT (HCC): Primary | ICD-10-CM

## 2018-05-25 DIAGNOSIS — E78.00 HIGH CHOLESTEROL: ICD-10-CM

## 2018-05-25 DIAGNOSIS — F50.81 BINGE EATING DISORDER: ICD-10-CM

## 2018-05-25 PROBLEM — F50.819 BINGE EATING DISORDER: Status: RESOLVED | Noted: 2018-04-20 | Resolved: 2018-05-25

## 2018-05-25 PROCEDURE — 99213 OFFICE O/P EST LOW 20 MIN: CPT | Performed by: NURSE PRACTITIONER

## 2018-05-25 NOTE — PATIENT INSTRUCTIONS
Goals for next month:  1. Keep a food log using [facility]  2. Drink 48-64 ounces of water per day. No fruit juices or regular soda. 3. Increase aerobic exercises (goal is 150 minutes per week)  4. Increase fruit and vegetable servings/day  5.  Keep carbs at or b

## 2018-05-25 NOTE — PROGRESS NOTES
[address], [facility]  [address] [facility] [address] [facility] [address]  Dept: [phone]     Date:   2018    Patient:  Shelly Jeong  :      1980  MRN:      [id number]    Chief Complaint:  P Oral Cap Take 1,000 mg by mouth daily. Disp:  Rfl:    Liraglutide (VICTOZA) 18 MG/3ML Subcutaneous Solution Pen-injector Inject 1.2 mg into the skin daily. Disp: 6 mL Rfl: 3   Melatonin 1 MG Oral Cap Take by mouth.  Disp:  Rfl:    Blood Glucose Monitoring S following:  · Eats 3 meal(s) per day  · Length of time it takes to consume a meal:  15 min   · # of snacks per day: 1-2 Type of snacks:  sweets  · Amount of soda consumption per day:  diet  · Amount of water (in ounces) per day:  Needs to improve  · Drinki (primary encounter diagnosis)  Binge eating disorder  Iddm (insulin dependent diabetes mellitus) (hcc)  High cholesterol    PLAN   Given the patient's age, degree of obesity and multiple medical problems outlined above, I do believe that bariatric surgery

## 2018-06-02 ENCOUNTER — PATIENT MESSAGE (OUTPATIENT)
Dept: ENDOCRINOLOGY CLINIC | Facility: CLINIC | Age: 38
End: 2018-06-02

## 2018-06-02 NOTE — TELEPHONE ENCOUNTER
Dr. Juan Butcher please review. Patient is having lower sugars than she is used to in the evenings around 90. Having some shaking. Is out of insulin but not sure if she should be taking it.

## 2018-06-02 NOTE — TELEPHONE ENCOUNTER
From: Dulce Carvalho  To: Silvana Landaverde MD  Sent: 6/2/2018 7:42 AM CDT  Subject: Non-Urgent Medical Question    Dear Dr. Diego Isidro dieting and my blood sugar has been dipping to 90 in the evenings. ..especially during the week when I'm working 14 hr days.

## 2018-06-29 ENCOUNTER — OFFICE VISIT (OUTPATIENT)
Dept: ENDOCRINOLOGY CLINIC | Facility: CLINIC | Age: 38
End: 2018-06-29

## 2018-06-29 VITALS
HEART RATE: 81 BPM | BODY MASS INDEX: 45.99 KG/M2 | WEIGHT: 293 LBS | HEIGHT: 67 IN | DIASTOLIC BLOOD PRESSURE: 86 MMHG | SYSTOLIC BLOOD PRESSURE: 129 MMHG

## 2018-06-29 DIAGNOSIS — Z79.4 UNCONTROLLED TYPE 2 DIABETES MELLITUS WITH COMPLICATION, WITH LONG-TERM CURRENT USE OF INSULIN (HCC): Primary | ICD-10-CM

## 2018-06-29 DIAGNOSIS — E11.8 UNCONTROLLED TYPE 2 DIABETES MELLITUS WITH COMPLICATION, WITH LONG-TERM CURRENT USE OF INSULIN (HCC): Primary | ICD-10-CM

## 2018-06-29 DIAGNOSIS — E11.65 UNCONTROLLED TYPE 2 DIABETES MELLITUS WITH COMPLICATION, WITH LONG-TERM CURRENT USE OF INSULIN (HCC): Primary | ICD-10-CM

## 2018-06-29 PROCEDURE — 99213 OFFICE O/P EST LOW 20 MIN: CPT | Performed by: INTERNAL MEDICINE

## 2018-06-29 PROCEDURE — 36416 COLLJ CAPILLARY BLOOD SPEC: CPT | Performed by: INTERNAL MEDICINE

## 2018-06-29 PROCEDURE — 82962 GLUCOSE BLOOD TEST: CPT | Performed by: INTERNAL MEDICINE

## 2018-06-29 PROCEDURE — 83036 HEMOGLOBIN GLYCOSYLATED A1C: CPT | Performed by: INTERNAL MEDICINE

## 2018-06-29 PROCEDURE — 99212 OFFICE O/P EST SF 10 MIN: CPT | Performed by: INTERNAL MEDICINE

## 2018-06-29 NOTE — PROGRESS NOTES
Name: Telma Lubin  Date: 6/29/2018    Referring Physician: Ketty Parra    HISTORY OF PRESENT ILLNESS   Telma Lubin is a 40year old female who presents for diabetes mellitus, diagnosed over 10 years ago.       Since last visit she was in ED f Tab, Take by mouth., Disp: , Rfl:   •  omega-3 fatty acids 1000 MG Oral Cap, Take 1,000 mg by mouth daily. , Disp: , Rfl:   •  Liraglutide (VICTOZA) 18 MG/3ML Subcutaneous Solution Pen-injector, Inject 1.2 mg into the skin daily. , Disp: 6 mL, Rfl: 3  •  Yaima kg/m²     General Appearance:  alert, well developed, in no acute distress  Eyes:  normal conjunctivae, sclera. , normal sclera and normal pupils  Ears/Nose/Mouth/Throat/Neck:  no palpable thyroid nodules or cervical lymphadenopathy  Back: no kyphosis or ba

## 2018-07-02 ENCOUNTER — PATIENT MESSAGE (OUTPATIENT)
Dept: ENDOCRINOLOGY CLINIC | Facility: CLINIC | Age: 38
End: 2018-07-02

## 2018-07-02 RX ORDER — LIRAGLUTIDE 6 MG/ML
INJECTION SUBCUTANEOUS
Qty: 6 ML | Refills: 0 | Status: SHIPPED | OUTPATIENT
Start: 2018-07-02 | End: 2018-07-03

## 2018-07-03 NOTE — TELEPHONE ENCOUNTER
[facility] patient [facility] please see patient's message. I am assuming she has had a yeast infection before and is experiencing similar symptoms.  Would you like to prescribe fluconazole or wait for patient

## 2018-07-03 NOTE — TELEPHONE ENCOUNTER
From: Peyman Greene  To: Jemma Franks MD  Sent: 7/2/2018 2:14 PM CDT  Subject: Prescription Question    Yeast infection is back. .. I'm going to stop taking invokana to see if the infection will stay away. Is there something else I should take?  Also, I f

## 2018-07-04 RX ORDER — FLUCONAZOLE 150 MG/1
150 TABLET ORAL ONCE
Qty: 1 TABLET | Refills: 0 | Status: SHIPPED | OUTPATIENT
Start: 2018-07-04 | End: 2018-07-04

## 2018-07-06 ENCOUNTER — OFFICE VISIT (OUTPATIENT)
Dept: SURGERY | Facility: CLINIC | Age: 38
End: 2018-07-06

## 2018-07-06 VITALS
RESPIRATION RATE: 16 BRPM | SYSTOLIC BLOOD PRESSURE: 123 MMHG | BODY MASS INDEX: 45.99 KG/M2 | HEART RATE: 79 BPM | HEIGHT: 67 IN | DIASTOLIC BLOOD PRESSURE: 81 MMHG | OXYGEN SATURATION: 97 % | WEIGHT: 293 LBS

## 2018-07-06 DIAGNOSIS — E78.00 HIGH CHOLESTEROL: ICD-10-CM

## 2018-07-06 DIAGNOSIS — E66.01 MORBID OBESITY WITH BMI OF 50.0-59.9, ADULT (HCC): ICD-10-CM

## 2018-07-06 DIAGNOSIS — R53.82 CHRONIC FATIGUE: ICD-10-CM

## 2018-07-06 DIAGNOSIS — IMO0001 IDDM (INSULIN DEPENDENT DIABETES MELLITUS): Primary | ICD-10-CM

## 2018-07-06 PROCEDURE — 99213 OFFICE O/P EST LOW 20 MIN: CPT | Performed by: INTERNAL MEDICINE

## 2018-07-06 NOTE — PROGRESS NOTES
[address] [place] [address] [address] [facility] [address]  Dept: [phone]     Date:   2018    Patient:  Karen Sultana  :      1980  MRN:      [id number]    Chief Complaint:  P Take by mouth. Disp:  Rfl:    Blood Glucose Monitoring Suppl ([facility]) w/Device Does not apply Kit by Does not apply route.  Test blood sugar TID Disp:  Rfl:    Insulin Pen Needle (PEN NEEDLES 5/16\") 31G X 8 MM Does not apply Misc For ounces) per day:  Needs to improve  · Drinking between meals only:  no  · Toughest challenge:  Exercise, avoiding sweets    Nutritional Goals  Limit carbohydrates to 100 gms per day, Eat 100-200 calories within 1 hour of waking  and Eat 3-4 cups of fresh f interested in bariatric surgery and will begin our presurgical process. DIABETES: Continue current medications; sugars are in the 150s per home glucose tests    DEGENERATIVE JOINT DISEASE: Of the knees is stable.  Encourage upper body exercises if unabl

## 2018-07-17 ENCOUNTER — PATIENT MESSAGE (OUTPATIENT)
Dept: ENDOCRINOLOGY CLINIC | Facility: CLINIC | Age: 38
End: 2018-07-17

## 2018-07-17 NOTE — TELEPHONE ENCOUNTER
From: Luis Chow  To: Marky Hallman MD  Sent: 7/17/2018 4:10 PM CDT  Subject: Visit Follow-up Question    I took the 5 doses of fluconazole and that seemed to do the trick for a week or so. ..but I can tell the yeast infection coming back.  Can you send

## 2018-07-18 NOTE — TELEPHONE ENCOUNTER
Since she is off the [address] [place] [address] Dealo she shouldn't be getting recurrent infections. Please refer to gynecology for further evaluation.

## 2018-07-18 NOTE — TELEPHONE ENCOUNTER
Emailed patient Dr. Edgar Walsh instructions and phone number for [facility] department. Advised that she may need a referral placed by PCP due to [facility] insurance. Encounter closed.

## 2018-07-24 ENCOUNTER — TELEPHONE (OUTPATIENT)
Dept: SURGERY | Facility: CLINIC | Age: 38
End: 2018-07-24

## 2018-07-24 NOTE — TELEPHONE ENCOUNTER
Patient called to reschedule 8/3/18 appt with Gavin NIXON to a later date within August. Reviewed 6 month consecutive weight management criteria with patient and Bariatric checklist received at seminar. Patient's appt was rescheduled to 8/13/18.

## 2018-07-25 ENCOUNTER — TELEPHONE (OUTPATIENT)
Dept: SURGERY | Facility: CLINIC | Age: 38
End: 2018-07-25

## 2018-07-25 NOTE — TELEPHONE ENCOUNTER
Called patient and advised to continue Victoza  May increase to 1.8 mg if she can tolerate it    Saxenda not covered

## 2018-07-25 NOTE — TELEPHONE ENCOUNTER
Received a fax from [email or web address] patients insurance denied the [address]. Notes states you will give her Victoza.

## 2018-07-31 ENCOUNTER — OFFICE VISIT (OUTPATIENT)
Dept: SURGERY | Facility: CLINIC | Age: 38
End: 2018-07-31

## 2018-07-31 VITALS — BODY MASS INDEX: 45.99 KG/M2 | WEIGHT: 293 LBS | HEIGHT: 67 IN

## 2018-07-31 DIAGNOSIS — IMO0001 IDDM (INSULIN DEPENDENT DIABETES MELLITUS): ICD-10-CM

## 2018-07-31 DIAGNOSIS — E66.01 MORBID OBESITY WITH BMI OF 45.0-49.9, ADULT (HCC): Primary | ICD-10-CM

## 2018-07-31 PROCEDURE — 97802 MEDICAL NUTRITION INDIV IN: CPT | Performed by: DIETITIAN, REGISTERED

## 2018-07-31 NOTE — PROGRESS NOTES
[address] [place] [address] AND WEIGHT LOSS CLINIC  [address] [place] [address]  Dept: [phone]  Loc: [phone]    07/31/18    Bariatric Initial Nutrition Assessment    Mariano Peterson is a 40year old female.     Refer none    Patient's motivation for bariatric surgery: Wants to treat her diabetes.     Allergies:    Clindamycin               Doxycycline             ITCHING  Sulfa Antibiotics         Vancomycin              ITCHING    Height:  Ht Readings from Last 1 Encou results found for: VITB1  No results found for: FOLIC    Relevant Meds:      Current Outpatient Prescriptions:   •  Liraglutide -Weight Management (SAXENDA) 18 MG/3ML Subcutaneous Solution Pen-injector, Inject 3 mg into the skin daily. , Disp: 5 pen, Rfl: 2 cream Frozen tv dinner Ice cream bar or other carb snack   Meal pattern consists of 3 meals, 2 snacks and 0 protein supplements.     Total Kcal: 0442-4353 cals per pt  Excessive in: calories and simple sugars  Inadequate in:  fruits and vegetables   Trigger protein diet prior to surgery    Additional RD visits required to review concepts? Yes, to monitor goals  Patient understands protein requirements? introduced  Patient understand fluid requirements (amount and method of intake)?  Introduced  Patient [employer]

## 2018-08-07 ENCOUNTER — TELEPHONE (OUTPATIENT)
Dept: INTERNAL MEDICINE CLINIC | Facility: CLINIC | Age: 38
End: 2018-08-07

## 2018-08-07 NOTE — TELEPHONE ENCOUNTER
PROTEIN TEST WAS DONE 5 OR 6 YEARS AGO. WOULD LIKE TO KNOW THE RESULTS.  OR TO HAVE A COPY OF THE RESULTS PLEASE

## 2018-08-13 ENCOUNTER — OFFICE VISIT (OUTPATIENT)
Dept: SURGERY | Facility: CLINIC | Age: 38
End: 2018-08-13

## 2018-08-13 VITALS
DIASTOLIC BLOOD PRESSURE: 87 MMHG | HEART RATE: 83 BPM | RESPIRATION RATE: 16 BRPM | SYSTOLIC BLOOD PRESSURE: 136 MMHG | BODY MASS INDEX: 45.99 KG/M2 | WEIGHT: 293 LBS | HEIGHT: 67 IN | OXYGEN SATURATION: 96 %

## 2018-08-13 DIAGNOSIS — E11.65 UNCONTROLLED TYPE 2 DIABETES MELLITUS WITH COMPLICATION, WITH LONG-TERM CURRENT USE OF INSULIN (HCC): ICD-10-CM

## 2018-08-13 DIAGNOSIS — E66.01 MORBID OBESITY WITH BMI OF 50.0-59.9, ADULT (HCC): Primary | ICD-10-CM

## 2018-08-13 DIAGNOSIS — R63.2 INCREASED APPETITE: ICD-10-CM

## 2018-08-13 DIAGNOSIS — Z79.4 UNCONTROLLED TYPE 2 DIABETES MELLITUS WITH COMPLICATION, WITH LONG-TERM CURRENT USE OF INSULIN (HCC): ICD-10-CM

## 2018-08-13 DIAGNOSIS — E11.8 UNCONTROLLED TYPE 2 DIABETES MELLITUS WITH COMPLICATION, WITH LONG-TERM CURRENT USE OF INSULIN (HCC): ICD-10-CM

## 2018-08-13 PROCEDURE — 99213 OFFICE O/P EST LOW 20 MIN: CPT | Performed by: NURSE PRACTITIONER

## 2018-08-13 NOTE — PATIENT INSTRUCTIONS
Goals for next month:  1. Keep a food log using [facility]  2. Drink 48-64 ounces of water per day. No fruit juices or regular soda. 3. Increase aerobic exercises (goal is 150 minutes per week)  4. Increase fruit and vegetable servings/day  5.  Keep carbs at or b

## 2018-08-13 NOTE — PROGRESS NOTES
[address], [address]  Dept: [phone]     Date:   18    Patient:  Karen Sultana  :      1980  MRN:      [id number]    Chief Complaint:  Luca Juliette 1 MG Oral Cap Take by mouth. Disp:  Rfl:    Blood Glucose Monitoring Suppl ([facility]) w/Device Does not apply Kit by Does not apply route.  Test blood sugar TID Disp:  Rfl:    Insulin Pen Needle (PEN NEEDLES 5/16\") 31G X 8 MM Does not a water (in ounces) per day:  Needs to improve  · Drinking between meals only:  no  · Toughest challenge:  Exercise, avoiding sweets    Nutritional Goals  Limit carbohydrates to 100 gms per day, Eat 100-200 calories within 1 hour of waking  and Eat 3-4 cups and multiple medical problems outlined above, I do believe that bariatric surgery may prove beneficial if the patient is unable to lose at least 20% of her weight after 6 months time.  Further consideration for bariatric surgery will be discussed at upcomin

## 2018-09-07 ENCOUNTER — OFFICE VISIT (OUTPATIENT)
Dept: SURGERY | Facility: CLINIC | Age: 38
End: 2018-09-07

## 2018-09-07 DIAGNOSIS — IMO0001 IDDM (INSULIN DEPENDENT DIABETES MELLITUS): Primary | ICD-10-CM

## 2018-09-07 DIAGNOSIS — E78.00 HIGH CHOLESTEROL: ICD-10-CM

## 2018-09-07 DIAGNOSIS — R53.82 CHRONIC FATIGUE: ICD-10-CM

## 2018-09-07 DIAGNOSIS — E66.01 MORBID OBESITY WITH BMI OF 50.0-59.9, ADULT (HCC): ICD-10-CM

## 2018-09-07 PROCEDURE — 99213 OFFICE O/P EST LOW 20 MIN: CPT | Performed by: INTERNAL MEDICINE

## 2018-09-07 NOTE — PROGRESS NOTES
[address], [address]  Dept: [phone]     Date:   18    Patient:  Noble Baron  :      1980  MRN:      [id number]    Chief Complaint:  Reshma Jobs Rfl:    Blood Glucose Monitoring Suppl ([facility]) w/Device Does not apply Kit by Does not apply route.  Test blood sugar TID Disp:  Rfl:    Insulin Pen Needle (PEN NEEDLES 5/16\") 31G X 8 MM Does not apply Misc For use with insulin pen, Journal  · Reviewed and Discussed:       · Patient has a Food Journal?: no   · Patient is reading nutrition labels? yes  · Average Caloric Intake:     · Average CHO Intake: 100  · Is patient exercising? no  · Type of exercise?  Other:  ADLs    Eating Habit person, place, and time. Skin: Skin is warm and dry. Psychiatric: She has a normal mood and affect. Her behavior is normal. Judgment and thought content normal.   Vitals reviewed. ASSESSMENT     Encounter Diagnosis(ses):    Iddm (insulin dependent di

## 2018-09-25 ENCOUNTER — OFFICE VISIT (OUTPATIENT)
Dept: SURGERY | Facility: CLINIC | Age: 38
End: 2018-09-25

## 2018-09-25 VITALS — HEIGHT: 67 IN | WEIGHT: 293 LBS | BODY MASS INDEX: 45.99 KG/M2

## 2018-09-25 DIAGNOSIS — IMO0001 IDDM (INSULIN DEPENDENT DIABETES MELLITUS): ICD-10-CM

## 2018-09-25 DIAGNOSIS — E66.01 MORBID OBESITY WITH BMI OF 45.0-49.9, ADULT (HCC): Primary | ICD-10-CM

## 2018-09-25 PROCEDURE — 97803 MED NUTRITION INDIV SUBSEQ: CPT | Performed by: DIETITIAN, REGISTERED

## 2018-09-25 NOTE — PROGRESS NOTES
[facility] AND WEIGHT LOSS CLINIC  [address] [place] [address]  Dept: [phone]  Loc: [phone]    09/25/18      Bariatric Follow-up Nutrition Session    Nuris Bob is a 45year old female.      Ass VITB12  No results found for: VITD, QVITD, VITD25, HZWU63CD  No results found for: THIAMINE   No results found for: VITB1  No results found for: FOLIC     Meds:     Current Outpatient Medications:   •  Liraglutide (VICTOZA) 18 MG/3ML Subcutaneous Solution Ham & cheese sandwich Ice coffee, light ice cream bar Frozen dinner     Total Calories:  ~1600 cals  Excessive in: nothing  Inadequate in:  fruits and vegetables     Patient has made some modifications and adjustments to diet: yes, in terms of moderate car

## 2018-09-28 ENCOUNTER — OFFICE VISIT (OUTPATIENT)
Dept: INTERNAL MEDICINE CLINIC | Facility: CLINIC | Age: 38
End: 2018-09-28

## 2018-09-28 VITALS
TEMPERATURE: 98 F | SYSTOLIC BLOOD PRESSURE: 110 MMHG | DIASTOLIC BLOOD PRESSURE: 72 MMHG | HEIGHT: 67 IN | HEART RATE: 74 BPM | WEIGHT: 293 LBS | RESPIRATION RATE: 18 BRPM | BODY MASS INDEX: 45.99 KG/M2

## 2018-09-28 DIAGNOSIS — Z11.3 SCREEN FOR STD (SEXUALLY TRANSMITTED DISEASE): Primary | ICD-10-CM

## 2018-09-28 PROCEDURE — 99214 OFFICE O/P EST MOD 30 MIN: CPT | Performed by: INTERNAL MEDICINE

## 2018-09-28 PROCEDURE — 90686 IIV4 VACC NO PRSV 0.5 ML IM: CPT | Performed by: INTERNAL MEDICINE

## 2018-09-28 PROCEDURE — 90471 IMMUNIZATION ADMIN: CPT | Performed by: INTERNAL MEDICINE

## 2018-09-28 NOTE — PROGRESS NOTES
HPI:    Patient ID: Bryant Irving is a 45year old female. HPI  She states that las night she noticed minimal redness in one spot in her right leg and she wanted to make sure is not cellulitis .  According to her today redness resolved , no fever or ch nervous/anxious. Current Outpatient Medications:  Liraglutide (VICTOZA) 18 MG/3ML Subcutaneous Solution Pen-injector Inject 1.8 mg into the skin daily.  Disp:  Rfl:    fluconazole (DIFLUCAN) 150 MG Oral Tab Take 1 tablet (150 mg total) by mouth d Right      Comment:  Scleral buckle OD (SB, cryo, drainage of SRF) for macula               off retinal detachment- Dr. Gregoria Nails  No date: TONSILLECTOMY   Family History   Problem Relation Age of Onset   • Diabetes Father    • Hypertension Mother    • Gl usage. No respiratory distress. She has no wheezes. She has no rales. She exhibits no tenderness. Abdominal: Soft. Bowel sounds are normal. She exhibits no shifting dullness, no distension and no mass. There is no hepatosplenomegaly.  There is no tenderne

## 2018-09-28 NOTE — PATIENT INSTRUCTIONS
Screen for std (sexually transmitted disease)  (primary encounter diagnosis) std panel  check      Dm type 2  on insulin  With eye complication- advised her to watch her diet , avoid carbohydrates , check blood sugar fasting and premeal and bring log book

## 2018-09-30 LAB
C TRACH DNA SPEC QL NAA+PROBE: NEGATIVE
N GONORRHOEA DNA SPEC QL NAA+PROBE: NEGATIVE

## 2018-10-04 ENCOUNTER — OFFICE VISIT (OUTPATIENT)
Dept: SURGERY | Facility: CLINIC | Age: 38
End: 2018-10-04

## 2018-10-04 VITALS
DIASTOLIC BLOOD PRESSURE: 77 MMHG | HEIGHT: 67 IN | RESPIRATION RATE: 18 BRPM | BODY MASS INDEX: 45.99 KG/M2 | OXYGEN SATURATION: 99 % | WEIGHT: 293 LBS | SYSTOLIC BLOOD PRESSURE: 126 MMHG | HEART RATE: 82 BPM

## 2018-10-04 VITALS
WEIGHT: 293 LBS | HEART RATE: 90 BPM | RESPIRATION RATE: 18 BRPM | OXYGEN SATURATION: 95 % | HEIGHT: 67 IN | BODY MASS INDEX: 45.99 KG/M2 | DIASTOLIC BLOOD PRESSURE: 94 MMHG | SYSTOLIC BLOOD PRESSURE: 137 MMHG

## 2018-10-04 DIAGNOSIS — E66.01 MORBID OBESITY WITH BMI OF 45.0-49.9, ADULT (HCC): ICD-10-CM

## 2018-10-04 DIAGNOSIS — E78.00 HIGH CHOLESTEROL: ICD-10-CM

## 2018-10-04 DIAGNOSIS — R73.09 ABNORMAL BLOOD SUGAR: ICD-10-CM

## 2018-10-04 DIAGNOSIS — E55.9 VITAMIN D DEFICIENCY: ICD-10-CM

## 2018-10-04 DIAGNOSIS — R53.82 CHRONIC FATIGUE: ICD-10-CM

## 2018-10-04 DIAGNOSIS — IMO0001 IDDM (INSULIN DEPENDENT DIABETES MELLITUS): Primary | ICD-10-CM

## 2018-10-04 PROCEDURE — 99213 OFFICE O/P EST LOW 20 MIN: CPT | Performed by: INTERNAL MEDICINE

## 2018-10-04 NOTE — PROGRESS NOTES
[address], [address]  Dept: [phone]     Date:   18    Patient:  Dayanara Ortiz  :      1980  MRN:      [id number]    Chief Complaint:  Wylene Cuff Take by mouth. Disp:  Rfl:    Blood Glucose Monitoring Suppl ([facility]) w/Device Does not apply Kit by Does not apply route.  Test blood sugar TID Disp:  Rfl:    Insulin Pen Needle (PEN NEEDLES 5/16\") 31G X 8 MM Does not apply Misc For degeneration Neg        Food Journal  · Reviewed and Discussed:       · Patient has a Food Journal?: no   · Patient is reading nutrition labels? yes  · Average Caloric Intake:     · Average CHO Intake: 100  · Is patient exercising? no  · Type of exercise? is alert and oriented to person, place, and time. Skin: Skin is warm and dry. Psychiatric: She has a normal mood and affect. Her behavior is normal. Judgment and thought content normal.   Vitals reviewed.     ASSESSMENT     Encounter Diagnosis(ses):   I

## 2018-10-05 RX ORDER — FLUOXETINE HYDROCHLORIDE 40 MG/1
CAPSULE ORAL
Qty: 30 CAPSULE | Refills: 3 | Status: SHIPPED | OUTPATIENT
Start: 2018-10-05 | End: 2019-02-15

## 2018-10-31 ENCOUNTER — OFFICE VISIT (OUTPATIENT)
Dept: CARDIOLOGY CLINIC | Facility: CLINIC | Age: 38
End: 2018-10-31

## 2018-10-31 VITALS
DIASTOLIC BLOOD PRESSURE: 92 MMHG | WEIGHT: 293 LBS | HEART RATE: 81 BPM | BODY MASS INDEX: 47 KG/M2 | SYSTOLIC BLOOD PRESSURE: 132 MMHG | RESPIRATION RATE: 18 BRPM

## 2018-10-31 DIAGNOSIS — E11.8 UNCONTROLLED TYPE 2 DIABETES MELLITUS WITH COMPLICATION, WITH LONG-TERM CURRENT USE OF INSULIN (HCC): ICD-10-CM

## 2018-10-31 DIAGNOSIS — Z79.4 UNCONTROLLED TYPE 2 DIABETES MELLITUS WITH COMPLICATION, WITH LONG-TERM CURRENT USE OF INSULIN (HCC): ICD-10-CM

## 2018-10-31 DIAGNOSIS — Z01.818 ENCOUNTER FOR PREOPERATIVE EXAMINATION FOR GENERAL SURGICAL PROCEDURE: Primary | ICD-10-CM

## 2018-10-31 DIAGNOSIS — E66.01 MORBID OBESITY (HCC): ICD-10-CM

## 2018-10-31 DIAGNOSIS — E11.65 UNCONTROLLED TYPE 2 DIABETES MELLITUS WITH COMPLICATION, WITH LONG-TERM CURRENT USE OF INSULIN (HCC): ICD-10-CM

## 2018-10-31 PROCEDURE — 93000 ELECTROCARDIOGRAM COMPLETE: CPT | Performed by: INTERNAL MEDICINE

## 2018-10-31 PROCEDURE — 99245 OFF/OP CONSLTJ NEW/EST HI 55: CPT | Performed by: INTERNAL MEDICINE

## 2018-10-31 PROCEDURE — 99212 OFFICE O/P EST SF 10 MIN: CPT | Performed by: INTERNAL MEDICINE

## 2018-10-31 PROCEDURE — 93005 ELECTROCARDIOGRAM TRACING: CPT | Performed by: INTERNAL MEDICINE

## 2018-10-31 NOTE — PROGRESS NOTES
Cardiology Consult Note    10/31/2018    Jason Kan is a 45year old female. HPI:   17-year-old female presents for initial visit.   Patient with history of diabetes on insulin since 2012 morbid obesity referred here for preop cardiac evaluation for b Use      Smoking status: Never Smoker      Smokeless tobacco: Never Used    Alcohol use: Yes      Comment: social-rare    Drug use: No     Family history  Father with bypass surgery at age 79 currently still alive. Positive for hypertension.       REVIEW O

## 2018-11-01 ENCOUNTER — LAB ENCOUNTER (OUTPATIENT)
Dept: LAB | Facility: HOSPITAL | Age: 38
End: 2018-11-01
Attending: INTERNAL MEDICINE
Payer: COMMERCIAL

## 2018-11-01 ENCOUNTER — TELEPHONE (OUTPATIENT)
Dept: SURGERY | Facility: CLINIC | Age: 38
End: 2018-11-01

## 2018-11-01 ENCOUNTER — OFFICE VISIT (OUTPATIENT)
Dept: SURGERY | Facility: CLINIC | Age: 38
End: 2018-11-01

## 2018-11-01 VITALS
OXYGEN SATURATION: 97 % | BODY MASS INDEX: 45.99 KG/M2 | DIASTOLIC BLOOD PRESSURE: 86 MMHG | WEIGHT: 293 LBS | HEIGHT: 67 IN | RESPIRATION RATE: 18 BRPM | HEART RATE: 84 BPM | SYSTOLIC BLOOD PRESSURE: 134 MMHG

## 2018-11-01 DIAGNOSIS — IMO0001 IDDM (INSULIN DEPENDENT DIABETES MELLITUS): ICD-10-CM

## 2018-11-01 DIAGNOSIS — R53.82 CHRONIC FATIGUE: ICD-10-CM

## 2018-11-01 DIAGNOSIS — E55.9 VITAMIN D DEFICIENCY: ICD-10-CM

## 2018-11-01 DIAGNOSIS — E66.01 MORBID OBESITY WITH BMI OF 45.0-49.9, ADULT (HCC): ICD-10-CM

## 2018-11-01 DIAGNOSIS — Z51.81 ENCOUNTER FOR THERAPEUTIC DRUG MONITORING: ICD-10-CM

## 2018-11-01 DIAGNOSIS — E78.00 HIGH CHOLESTEROL: ICD-10-CM

## 2018-11-01 DIAGNOSIS — E11.65 UNCONTROLLED TYPE 2 DIABETES MELLITUS WITH COMPLICATION, WITH LONG-TERM CURRENT USE OF INSULIN (HCC): Primary | ICD-10-CM

## 2018-11-01 DIAGNOSIS — Z79.4 UNCONTROLLED TYPE 2 DIABETES MELLITUS WITH COMPLICATION, WITH LONG-TERM CURRENT USE OF INSULIN (HCC): Primary | ICD-10-CM

## 2018-11-01 DIAGNOSIS — R73.09 ABNORMAL BLOOD SUGAR: ICD-10-CM

## 2018-11-01 DIAGNOSIS — E11.8 UNCONTROLLED TYPE 2 DIABETES MELLITUS WITH COMPLICATION, WITH LONG-TERM CURRENT USE OF INSULIN (HCC): Primary | ICD-10-CM

## 2018-11-01 PROCEDURE — 82306 VITAMIN D 25 HYDROXY: CPT

## 2018-11-01 PROCEDURE — 83735 ASSAY OF MAGNESIUM: CPT

## 2018-11-01 PROCEDURE — 82746 ASSAY OF FOLIC ACID SERUM: CPT

## 2018-11-01 PROCEDURE — 82728 ASSAY OF FERRITIN: CPT

## 2018-11-01 PROCEDURE — 85025 COMPLETE CBC W/AUTO DIFF WBC: CPT

## 2018-11-01 PROCEDURE — 36415 COLL VENOUS BLD VENIPUNCTURE: CPT

## 2018-11-01 PROCEDURE — 80061 LIPID PANEL: CPT

## 2018-11-01 PROCEDURE — 80053 COMPREHEN METABOLIC PANEL: CPT

## 2018-11-01 PROCEDURE — 84425 ASSAY OF VITAMIN B-1: CPT

## 2018-11-01 PROCEDURE — 99213 OFFICE O/P EST LOW 20 MIN: CPT | Performed by: INTERNAL MEDICINE

## 2018-11-01 PROCEDURE — 83540 ASSAY OF IRON: CPT

## 2018-11-01 PROCEDURE — 84100 ASSAY OF PHOSPHORUS: CPT

## 2018-11-01 PROCEDURE — 82607 VITAMIN B-12: CPT

## 2018-11-01 PROCEDURE — 83036 HEMOGLOBIN GLYCOSYLATED A1C: CPT

## 2018-11-01 PROCEDURE — 84443 ASSAY THYROID STIM HORMONE: CPT

## 2018-11-01 NOTE — PROGRESS NOTES
[address], [address]  Dept: [phone]     Date:   18    Patient:  Tha Delcid  :      1980  MRN:      [id number]    Chief Complaint:  Kenan GLUCOSE SYSTEM) w/Device Does not apply Kit by Does not apply route.  Test blood sugar TID Disp:  Rfl:    Insulin Pen Needle (PEN NEEDLES 5/16\") 31G X 8 MM Does not apply Misc For use with insulin pen, up to 5 injections daily Disp: 200 each Rfl: 6     All reading nutrition labels? yes  · Average Caloric Intake:     · Average CHO Intake: 100  · Is patient exercising? no  · Type of exercise?  Other:  ADLs    Eating Habits  · Patient states the following:  · Eats 3 meal(s) per day  · Length of time it takes to and affect. Her behavior is normal. Judgment and thought content normal.   Vitals reviewed. ASSESSMENT     Encounter Diagnosis(ses):    Uncontrolled type 2 diabetes mellitus with complication, with long-term current use of insulin (hcc)  (primary encount

## 2018-11-02 ENCOUNTER — TELEPHONE (OUTPATIENT)
Dept: SURGERY | Facility: CLINIC | Age: 38
End: 2018-11-02

## 2018-11-02 ENCOUNTER — HOSPITAL ENCOUNTER (OUTPATIENT)
Dept: GENERAL RADIOLOGY | Age: 38
Discharge: HOME | End: 2018-11-02
Attending: INTERNAL MEDICINE
Payer: COMMERCIAL

## 2018-11-02 ENCOUNTER — OFFICE VISIT (OUTPATIENT)
Dept: PULMONOLOGY | Facility: CLINIC | Age: 38
End: 2018-11-02

## 2018-11-02 VITALS — WEIGHT: 293 LBS | HEIGHT: 67 IN | RESPIRATION RATE: 18 BRPM | OXYGEN SATURATION: 94 % | BODY MASS INDEX: 45.99 KG/M2

## 2018-11-02 DIAGNOSIS — Z01.818 PREOPERATIVE CLEARANCE: Primary | ICD-10-CM

## 2018-11-02 DIAGNOSIS — Z01.818 PREOPERATIVE CLEARANCE: ICD-10-CM

## 2018-11-02 PROCEDURE — 99212 OFFICE O/P EST SF 10 MIN: CPT | Performed by: INTERNAL MEDICINE

## 2018-11-02 PROCEDURE — 99244 OFF/OP CNSLTJ NEW/EST MOD 40: CPT | Performed by: INTERNAL MEDICINE

## 2018-11-02 PROCEDURE — 71046 X-RAY EXAM CHEST 2 VIEWS: CPT | Performed by: INTERNAL MEDICINE

## 2018-11-02 RX ORDER — ERGOCALCIFEROL (VITAMIN D2) 1250 MCG
50000 CAPSULE ORAL WEEKLY
Qty: 12 CAPSULE | Refills: 1 | Status: SHIPPED | OUTPATIENT
Start: 2018-11-02 | End: 2019-01-19

## 2018-11-02 NOTE — H&P
Referring Physician  Nat Deshpande MD    Chief Complaint  Preoperative pulmonary clearance    History of Present Illness  Patient is a 51-year-old female who presents today for preoperative pulmonary clearance for upcoming bariatric weight loss surge (Patient taking differently: 42 Units. Inject 35 units daily ) Disp: 15 mL Rfl: 3   ibuprofen 200 MG Oral Tab Take 200 mg by mouth every 6 (six) hours as needed for Pain. Disp:  Rfl:    Magnesium 100 MG Oral Tab Take by mouth.  Disp:  Rfl:    omega-3 fatty surgery.     Follow Up  On as-needed basis    Melvina Chavez, DO  Pulmonary Medicine  [address] [facility] [address]  11/2/2018  11:22 AM

## 2018-11-05 ENCOUNTER — HOSPITAL ENCOUNTER (OUTPATIENT)
Dept: CV DIAGNOSTICS | Facility: HOSPITAL | Age: 38
Discharge: HOME | End: 2018-11-05
Attending: INTERNAL MEDICINE
Payer: COMMERCIAL

## 2018-11-05 ENCOUNTER — HOSPITAL ENCOUNTER (OUTPATIENT)
Dept: NUCLEAR MEDICINE | Facility: HOSPITAL | Age: 38
Discharge: HOME | End: 2018-11-05
Attending: INTERNAL MEDICINE
Payer: COMMERCIAL

## 2018-11-05 DIAGNOSIS — Z01.818 ENCOUNTER FOR PREOPERATIVE EXAMINATION FOR GENERAL SURGICAL PROCEDURE: ICD-10-CM

## 2018-11-05 PROCEDURE — 93018 CV STRESS TEST I&R ONLY: CPT | Performed by: INTERNAL MEDICINE

## 2018-11-05 PROCEDURE — 93017 CV STRESS TEST TRACING ONLY: CPT | Performed by: INTERNAL MEDICINE

## 2018-11-05 PROCEDURE — 93016 CV STRESS TEST SUPVJ ONLY: CPT | Performed by: INTERNAL MEDICINE

## 2018-11-05 PROCEDURE — 78452 HT MUSCLE IMAGE SPECT MULT: CPT | Performed by: INTERNAL MEDICINE

## 2018-11-05 RX ORDER — SODIUM CHLORIDE 9 MG/ML
INJECTION, SOLUTION INTRAVENOUS
Status: COMPLETED
Start: 2018-11-05 | End: 2018-11-05

## 2018-11-05 RX ADMIN — SODIUM CHLORIDE 60 ML: 9 INJECTION, SOLUTION INTRAVENOUS at 09:00:00

## 2018-11-07 ENCOUNTER — TELEPHONE (OUTPATIENT)
Dept: SURGERY | Facility: CLINIC | Age: 38
End: 2018-11-07

## 2018-11-07 ENCOUNTER — OFFICE VISIT (OUTPATIENT)
Dept: SURGERY | Facility: CLINIC | Age: 38
End: 2018-11-07

## 2018-11-07 VITALS
HEART RATE: 80 BPM | DIASTOLIC BLOOD PRESSURE: 91 MMHG | WEIGHT: 293 LBS | SYSTOLIC BLOOD PRESSURE: 143 MMHG | BODY MASS INDEX: 45.99 KG/M2 | HEIGHT: 67 IN | RESPIRATION RATE: 16 BRPM

## 2018-11-07 DIAGNOSIS — E78.00 HIGH CHOLESTEROL: Primary | ICD-10-CM

## 2018-11-07 DIAGNOSIS — Z01.818 PREOP EXAMINATION: ICD-10-CM

## 2018-11-07 DIAGNOSIS — M72.2 PLANTAR FASCIITIS: ICD-10-CM

## 2018-11-07 DIAGNOSIS — Z51.81 ENCOUNTER FOR THERAPEUTIC DRUG MONITORING: ICD-10-CM

## 2018-11-07 DIAGNOSIS — IMO0001 IDDM (INSULIN DEPENDENT DIABETES MELLITUS): ICD-10-CM

## 2018-11-07 DIAGNOSIS — E66.01 MORBID OBESITY (HCC): ICD-10-CM

## 2018-11-07 NOTE — H&P
[address], [address] [address]  Dept: [phone]    11/7/2018  Bariatric New Patient Evaluation    Chief Complaint:  Morbid obesity     History of Present Illness: Macular degeneration Neg    Has either Protein C or S deficiency on mother's side. Pt was tested herself and was negative.     Social History:  Social History    Socioeconomic History      Marital status: Single      Spouse name: Not on file      Number of mouth., Disp: , Rfl:   •  omega-3 fatty acids 1000 MG Oral Cap, Take 1,000 mg by mouth daily. , Disp: , Rfl:   •  Melatonin 1 MG Oral Cap, Take by mouth., Disp: , Rfl:   •  Blood Glucose Monitoring Suppl ([facility]) w/Device Does not appl recently completed a stress test as part of her anticipated cardiac clearance. She has had a couple visits with the dietician already.   Baseline laboratory studies were checked and relatively low vit D and B12 levels are being addressed by Dr. Akash Miller with

## 2018-11-08 ENCOUNTER — TELEPHONE (OUTPATIENT)
Dept: CARDIOLOGY CLINIC | Facility: CLINIC | Age: 38
End: 2018-11-08

## 2018-11-08 NOTE — TELEPHONE ENCOUNTER
S/w Leisa. Stress test is stable, pt may proceed with surgery , requesting letter faxed to Dr Chidi Yung at [facility].

## 2018-11-15 ENCOUNTER — OFFICE VISIT (OUTPATIENT)
Dept: SURGERY | Facility: CLINIC | Age: 38
End: 2018-11-15

## 2018-11-15 ENCOUNTER — TELEPHONE (OUTPATIENT)
Dept: CARDIOLOGY CLINIC | Facility: CLINIC | Age: 38
End: 2018-11-15

## 2018-11-15 VITALS — WEIGHT: 293 LBS | BODY MASS INDEX: 45.99 KG/M2 | HEIGHT: 67 IN

## 2018-11-15 DIAGNOSIS — E66.01 CLASS 3 SEVERE OBESITY DUE TO EXCESS CALORIES WITH SERIOUS COMORBIDITY AND BODY MASS INDEX (BMI) OF 45.0 TO 49.9 IN ADULT (HCC): Primary | ICD-10-CM

## 2018-11-15 PROCEDURE — 97803 MED NUTRITION INDIV SUBSEQ: CPT | Performed by: DIETITIAN, REGISTERED

## 2018-11-15 NOTE — PROGRESS NOTES
[address]  [address] [address]  Dept: [phone]  Loc: [phone]    11/15/18      Bariatric Follow-up Nutrition Session    Jelani Hughes is a 45year old female.      Ass Vitamins/Minerals:  Lab Results   Component Value Date    B12 261 11/01/2018     Lab Results   Component Value Date    CSID27EY 11.6 (L) 11/01/2018     Lab Results   Component Value Date/Time    THIAMINE 98 11/01/2018 12:51 PM      No results found for oz  11/07/18 : (!) 302 lb 12.8 oz  11/02/18 : (!) 303 lb  11/01/18 : (!) 302 lb  10/31/18 : (!) 301 lb  10/04/18 : (!) 305 lb 0.1 oz      Weight change:  Pt has lost 7.5 lbs since last RD visit 11/07/2018. BMI: Body mass index is 46.13 kg/m².     Protein I fluid intake and keep consistent food records. Body comp analysis done today- pt w/ good muscle mass exceeding 100% in all areas (127.5% in R arm, 128.5% in L arm, 111.4% in trunk, 106.4% in R leg, and 100.4% in L leg).  Notable muscle imbalance between

## 2018-11-15 NOTE — TELEPHONE ENCOUNTER
Per Derrill Cabot patient had called asking if she needed the follow up apt that was scheduled with Dr. Yumi Tolbert on 12/12. Upon checking it was decided a follow up apt was not needed at this time. Pt currently in surgery.   Cancelled apt and sent patient mychart

## 2018-11-27 ENCOUNTER — OFFICE VISIT (OUTPATIENT)
Dept: NUTRITION/OBESITY MEDICINE | Facility: HOSPITAL | Age: 38
End: 2018-11-27
Attending: SINGLE SPECIALTY
Payer: COMMERCIAL

## 2018-11-27 DIAGNOSIS — E66.01 MORBID (SEVERE) OBESITY DUE TO EXCESS CALORIES (HCC): Primary | ICD-10-CM

## 2018-11-27 PROCEDURE — 96101 PSYCL TSTG PR HR F2F TIME W/PT: CPT | Performed by: OTHER

## 2018-11-27 NOTE — PROGRESS NOTES
Psychological Evaluation    Patient Name: Ignacia Davis  Visit Date:  2018  :   1980    Reason for Referral:    Katelin Vidal is a 45year old single  female who was referred for a psychological evaluation prior to being scheduled for santos Victoza, and Insulin.   Regarding a familial history of medical issues, Shelly stated that her father suffers from diabetes and had a quadruple bypass in his 66's, while her mother suffered from hypertension at one point, as well as suffers from a blood skip cooperative with the interviewer and open in her presentation of personal information. She was oriented as to time, person, and place. There appeared to be no abnormalities in her sensorium or mental capacity.   Her overall affect and mood fell within the Conversely, regarding the Sexual Attractiveness factor, she endorsed holding mainly positive feelings, aside from endorsing negative feelings regarding her chin and chest.    Dietary Habits    On the Rapid Eating Assessment for Patients (REAP) Leisa note which she struggles, notably diabetes, she is concerned that her health will worsen if she is unable to maintain a healthy weight.   Due to her unsuccessful attempts she stated that she is ready to undergo the procedure and follow through with the requireme

## 2018-12-06 RX ORDER — INSULIN DETEMIR 100 [IU]/ML
INJECTION, SOLUTION SUBCUTANEOUS
Qty: 15 ML | Refills: 0 | Status: ON HOLD | OUTPATIENT
Start: 2018-12-06 | End: 2019-04-16

## 2018-12-17 ENCOUNTER — OFFICE VISIT (OUTPATIENT)
Dept: SURGERY | Facility: CLINIC | Age: 38
End: 2018-12-17

## 2018-12-17 VITALS
SYSTOLIC BLOOD PRESSURE: 133 MMHG | WEIGHT: 292.06 LBS | DIASTOLIC BLOOD PRESSURE: 86 MMHG | RESPIRATION RATE: 18 BRPM | HEIGHT: 67 IN | BODY MASS INDEX: 45.84 KG/M2 | OXYGEN SATURATION: 99 % | HEART RATE: 75 BPM

## 2018-12-17 DIAGNOSIS — Z51.81 ENCOUNTER FOR THERAPEUTIC DRUG MONITORING: ICD-10-CM

## 2018-12-17 DIAGNOSIS — IMO0001 IDDM (INSULIN DEPENDENT DIABETES MELLITUS): Primary | ICD-10-CM

## 2018-12-17 DIAGNOSIS — E78.00 HIGH CHOLESTEROL: ICD-10-CM

## 2018-12-17 DIAGNOSIS — R63.2 INCREASED APPETITE: ICD-10-CM

## 2018-12-17 DIAGNOSIS — E66.01 MORBID OBESITY WITH BMI OF 45.0-49.9, ADULT (HCC): ICD-10-CM

## 2018-12-17 PROCEDURE — 99214 OFFICE O/P EST MOD 30 MIN: CPT | Performed by: INTERNAL MEDICINE

## 2018-12-17 NOTE — PATIENT INSTRUCTIONS
Outpatient Encounter Medications as of 12/17/2018   Medication Sig Note   • LEVEMIR FLEXTOUCH 100 UNIT/ML Subcutaneous Solution Pen-injector ADMINISTER 35 UNITS UNDER THE SKIN DAILY 12/17/2018: Hold during the two week liquid. Call if sugars are above 200. 12/17/2018.

## 2018-12-17 NOTE — PROGRESS NOTES
[address], [address]  Dept: [phone]     Date:   18    Patient:  Luis Chow  :      1980  MRN:      [id number]    Chief Complaint:  Renetta Garcia mouth. Disp:  Rfl:    omega-3 fatty acids 1000 MG Oral Cap Take 1,000 mg by mouth daily. Disp:  Rfl:    Melatonin 1 MG Oral Cap Take by mouth.  Disp:  Rfl:    Blood Glucose Monitoring Suppl ([facility]) w/Device Does not apply Kit by Does of Onset   • Diabetes Father    • Hypertension Mother    • Glaucoma Neg    • Macular degeneration Neg        Food Journal  · Reviewed and Discussed:       · Patient has a Food Journal?: no   · Patient is reading nutrition labels?   yes  · Average Caloric In Musculoskeletal: Normal range of motion. She exhibits no edema. Neurological: She is alert and oriented to person, place, and time. Skin: Skin is warm and dry. Psychiatric: She has a normal mood and affect.  Her behavior is normal. Judgment and thou Subcutaneous Solution Pen-injector ADMINISTER 35 UNITS UNDER THE SKIN DAILY 12/17/2018: Hold during the two week liquid. Call if sugars are above 200.   Discontinue after bariatric surgery   • [DISCONTINUED] Liraglutide (VICTOZA) 18 MG/3ML Subcutaneous Solu

## 2018-12-19 ENCOUNTER — OFFICE VISIT (OUTPATIENT)
Dept: SURGERY | Facility: CLINIC | Age: 38
End: 2018-12-19

## 2018-12-19 VITALS
DIASTOLIC BLOOD PRESSURE: 72 MMHG | BODY MASS INDEX: 45.99 KG/M2 | RESPIRATION RATE: 16 BRPM | WEIGHT: 293 LBS | HEIGHT: 67 IN | HEART RATE: 67 BPM | SYSTOLIC BLOOD PRESSURE: 130 MMHG

## 2018-12-19 DIAGNOSIS — IMO0001 IDDM (INSULIN DEPENDENT DIABETES MELLITUS): ICD-10-CM

## 2018-12-19 DIAGNOSIS — E78.00 HIGH CHOLESTEROL: Primary | ICD-10-CM

## 2018-12-19 DIAGNOSIS — M72.2 PLANTAR FASCIITIS: ICD-10-CM

## 2018-12-19 DIAGNOSIS — E66.01 MORBID OBESITY WITH BMI OF 45.0-49.9, ADULT (HCC): ICD-10-CM

## 2018-12-19 DIAGNOSIS — R53.82 CHRONIC FATIGUE: ICD-10-CM

## 2018-12-19 NOTE — PROGRESS NOTES
[address] [place] [address]  Dept: [phone]    12/19/2018   Bariatric Patient Follow-up Evaluation    Chief Complaint:  Morbid obesity     History of Present I Tobacco Use      Smoking status: Never Smoker      Smokeless tobacco: Never Used    Substance and Sexual Activity      Alcohol use: Yes        Comment: social-rare      Drug use: No      Medications:   Current Outpatient Medications:   •  Sander Harris expansion, clear to ausculation bilaterally, no wheezing  Heart: regular rate and rhythm, no murmur detected  Abdomen: soft, obese, non-tender, non-distended, no hernia/mass/organomegaly  Extremities: normal strength, normal ROM, walks without assist devic

## 2019-01-07 ENCOUNTER — OFFICE VISIT (OUTPATIENT)
Dept: ENDOCRINOLOGY CLINIC | Facility: CLINIC | Age: 39
End: 2019-01-07

## 2019-01-07 VITALS
HEART RATE: 90 BPM | DIASTOLIC BLOOD PRESSURE: 80 MMHG | SYSTOLIC BLOOD PRESSURE: 117 MMHG | WEIGHT: 293 LBS | BODY MASS INDEX: 47 KG/M2

## 2019-01-07 DIAGNOSIS — E11.8 CONTROLLED TYPE 2 DIABETES MELLITUS WITH COMPLICATION, WITH LONG-TERM CURRENT USE OF INSULIN (HCC): Primary | ICD-10-CM

## 2019-01-07 DIAGNOSIS — Z79.4 CONTROLLED TYPE 2 DIABETES MELLITUS WITH COMPLICATION, WITH LONG-TERM CURRENT USE OF INSULIN (HCC): Primary | ICD-10-CM

## 2019-01-07 LAB
CARTRIDGE LOT#: ABNORMAL NUMERIC
GLUCOSE BLOOD: 166
HEMOGLOBIN A1C: 6.5 % (ref 4.3–5.6)
TEST STRIP LOT #: NORMAL NUMERIC

## 2019-01-07 PROCEDURE — 83036 HEMOGLOBIN GLYCOSYLATED A1C: CPT | Performed by: INTERNAL MEDICINE

## 2019-01-07 PROCEDURE — 82962 GLUCOSE BLOOD TEST: CPT | Performed by: INTERNAL MEDICINE

## 2019-01-07 PROCEDURE — 99213 OFFICE O/P EST LOW 20 MIN: CPT | Performed by: INTERNAL MEDICINE

## 2019-01-07 PROCEDURE — 99212 OFFICE O/P EST SF 10 MIN: CPT | Performed by: INTERNAL MEDICINE

## 2019-01-07 PROCEDURE — 36416 COLLJ CAPILLARY BLOOD SPEC: CPT | Performed by: INTERNAL MEDICINE

## 2019-01-07 RX ORDER — BLOOD SUGAR DIAGNOSTIC
STRIP MISCELLANEOUS
Qty: 200 STRIP | Refills: 1 | Status: SHIPPED | OUTPATIENT
Start: 2019-01-07 | End: 2019-12-20

## 2019-01-07 NOTE — PROGRESS NOTES
Name: Liv Cain  Date: 1/7/2019    Referring Physician: Lisa Lewis    HISTORY OF PRESENT ILLNESS   Liv Cain is a 45year old female who presents for diabetes mellitus, diagnosed over 10 years ago.       She has been following with Dr. Luna Ramírez , Rfl:   •  Blood Glucose Monitoring Suppl ([facility]) w/Device Does not apply Kit, by Does not apply route.  Test blood sugar TID, Disp: , Rfl:   •  Insulin Pen Needle (PEN NEEDLES 5/16\") 31G X 8 MM Does not apply Misc, For use with ins sclera. , normal sclera and normal pupils  Ears/Nose/Mouth/Throat/Neck:  no palpable thyroid nodules or cervical lymphadenopathy  Back: no kyphosis or back tenderness  Respiratory:  clear to auscultation bilaterally  Cardiovascular:  regular rate, rhythm, ,

## 2019-01-21 ENCOUNTER — OFFICE VISIT (OUTPATIENT)
Dept: SURGERY | Facility: CLINIC | Age: 39
End: 2019-01-21

## 2019-01-21 VITALS — BODY MASS INDEX: 45.99 KG/M2 | HEIGHT: 67 IN | WEIGHT: 293 LBS

## 2019-01-21 DIAGNOSIS — E66.01 CLASS 3 SEVERE OBESITY DUE TO EXCESS CALORIES WITH SERIOUS COMORBIDITY AND BODY MASS INDEX (BMI) OF 45.0 TO 49.9 IN ADULT (HCC): Primary | ICD-10-CM

## 2019-01-21 PROCEDURE — 97803 MED NUTRITION INDIV SUBSEQ: CPT | Performed by: DIETITIAN, REGISTERED

## 2019-01-21 NOTE — PROGRESS NOTES
[address] [place] [address] [facility]  [address] [place] [address]  Dept: [phone]  Loc: [phone]    01/21/19      Bariatric Follow-up Nutrition Session    Dane Paredes is a 45year old female.      Roby Vitamins/Minerals:  Lab Results   Component Value Date    B12 261 11/01/2018     Lab Results   Component Value Date    ELJH77FB 11.6 (L) 11/01/2018     Lab Results   Component Value Date/Time    THIAMINE 98 11/01/2018 12:51 PM      No results found for last visit 11/15/2018. BMI: Body mass index is 46.67 kg/m².     Protein Intake: 70 grams/day  Fluid intake:  32 ounces/day    Diet history:       Breakfast      AM Snack       Lunch     PM Snack     Dinner   Skips  5 deli chicken tenders 1 chocolate sqaure intolerances, Activity level, Vitamin/mineral supplementation, Reinforce goals, Calorie/protein intake and Other:  nutrition labs, hx low Vit D and B12  Additional RD visits required to review concepts?  Yes, to  for liquid protein diet  Patient unde

## 2019-02-08 ENCOUNTER — PATIENT MESSAGE (OUTPATIENT)
Dept: INTERNAL MEDICINE CLINIC | Facility: CLINIC | Age: 39
End: 2019-02-08

## 2019-02-08 DIAGNOSIS — E66.01 MORBID OBESITY (HCC): Primary | ICD-10-CM

## 2019-02-11 NOTE — TELEPHONE ENCOUNTER
From: Kandis Wilson  To: Gladys Rhodes MD  Sent: 2/8/2019 6:07 PM CST  Subject: Referral Request    Can you make sure I have enough referrals left for Dr Thi Currie?  I'm supposed to meet him next Wednesday to get a surgery date but I think I might have used u

## 2019-02-13 ENCOUNTER — OFFICE VISIT (OUTPATIENT)
Dept: SURGERY | Facility: CLINIC | Age: 39
End: 2019-02-13

## 2019-02-13 VITALS
DIASTOLIC BLOOD PRESSURE: 83 MMHG | RESPIRATION RATE: 16 BRPM | HEIGHT: 67 IN | BODY MASS INDEX: 45.99 KG/M2 | SYSTOLIC BLOOD PRESSURE: 125 MMHG | OXYGEN SATURATION: 98 % | WEIGHT: 293 LBS | HEART RATE: 75 BPM

## 2019-02-13 DIAGNOSIS — IMO0001 IDDM (INSULIN DEPENDENT DIABETES MELLITUS): Primary | ICD-10-CM

## 2019-02-13 DIAGNOSIS — E66.01 MORBID OBESITY (HCC): ICD-10-CM

## 2019-02-13 NOTE — PROGRESS NOTES
[address], [address] [address]  Dept: [phone]    2/13/2019   Bariatric Patient Follow-up Evaluation    Chief Complaint:  Morbid obesity     History of Present Il Smokeless tobacco: Never Used    Substance and Sexual Activity      Alcohol use: Yes        Comment: social-rare      Drug use: No      Medications:   Current Outpatient Medications:   •  ONETOUCH DELICA LANCETS FINE Does not apply Misc, Check 2 times robert well-nourished  HENT: normocephalic, atraumatic  Lung: breathing comfortably, symmetrical expansion, clear to ausculation bilaterally, no wheezing  Heart: regular rate and rhythm, no murmur detected  Abdomen: soft, obese, non-tender, non-distended, no max

## 2019-02-15 RX ORDER — FLUOXETINE HYDROCHLORIDE 40 MG/1
CAPSULE ORAL
Qty: 30 CAPSULE | Refills: 1 | Status: SHIPPED | OUTPATIENT
Start: 2019-02-15 | End: 2019-04-29

## 2019-02-28 ENCOUNTER — OFFICE VISIT (OUTPATIENT)
Dept: SURGERY | Facility: CLINIC | Age: 39
End: 2019-02-28

## 2019-02-28 VITALS — BODY MASS INDEX: 44.41 KG/M2 | HEIGHT: 68 IN | WEIGHT: 293 LBS

## 2019-02-28 DIAGNOSIS — E66.01 CLASS 3 SEVERE OBESITY DUE TO EXCESS CALORIES WITH SERIOUS COMORBIDITY AND BODY MASS INDEX (BMI) OF 40.0 TO 44.9 IN ADULT (HCC): Primary | ICD-10-CM

## 2019-02-28 PROCEDURE — 97803 MED NUTRITION INDIV SUBSEQ: CPT | Performed by: DIETITIAN, REGISTERED

## 2019-02-28 NOTE — PROGRESS NOTES
[address] [place] [address] AND WE[facility] CLINIC  [address] [place] [address]  Dept: [phone]  Loc: [phone]    02/28/19    Bariatric Liquid Protein Nutrition Session    Deena Hardy is a 45year old female    Asse Diabetes:    HGBA1C:    Lab Results   Component Value Date    A1C 6.5 (A) 01/07/2019    A1C 6.5 (H) 11/01/2018    A1C 6.6 (A) 06/29/2018     (H) 11/01/2018       Lipid Panel:  Lab Results   Component Value Date    CHOLEST 192 11/01/2018    TR Dinner        Beverages   Greek yogurt     Trail mix Ham and cheese roll ups Cheese stick Atkins Meal >64 oz fluid     Total Kcal: 800-1000 calories/day  Protein intake: ~70 grams/day  Fluid intake:  >64 ounces/day    Excessive in: nothing  Inadequate D  Patient understands protein requirements? Yes  Patient understand fluid requirements (amount and method of intake)? Yes  Patient understands post-operative diet? Yes  Patient completed nutrition quizzes? Yes  Patient signed commitment agreement?  Yes  Pa

## 2019-03-07 PROBLEM — E55.9 VITAMIN D DEFICIENCY: Status: ACTIVE | Noted: 2019-03-07

## 2019-03-07 PROBLEM — Z01.818 PREOP EXAMINATION: Status: RESOLVED | Noted: 2017-06-08 | Resolved: 2019-03-07

## 2019-03-07 NOTE — PROGRESS NOTES
Ruthie Denise is a 45year old female. Patient presents with:   Anxiety: experiencing anxiety about once a week, started about a month ago  Imm/Inj: would like flu vaccine    HPI:   40-year-old female with a history of diabetes here for evaluation of anxiety route. Test blood sugar TID Disp:  Rfl:       Past Medical History:   Diagnosis Date   • Diabetes ([facility] 2012   • Hyperlipidemia    • Morbid obesity with BMI of 50.0-59.9, adult ([facility]    • Preop examination 6/8/2017   • Retinal detachment- right eye 6/8/201 color change and wound. Neurological: Negative for seizures, facial asymmetry and speech difficulty. Psychiatric/Behavioral: Negative for hallucinations, behavioral problems, confusion and agitation. The patient is nervous/anxious.       Wt Readings fro

## 2019-03-07 NOTE — PATIENT INSTRUCTIONS
As we discussed, our goal to control anxiety is by controlling her mind with exercise, meditation etc.  I really do not want you to be drug dependent. However I am giving you the anxiety medicine to take it only and only and only when you need it.   Please

## 2019-04-02 RX ORDER — ERGOCALCIFEROL 1.25 MG/1
50000 CAPSULE ORAL WEEKLY
Status: ON HOLD | COMMUNITY
End: 2019-04-16

## 2019-04-05 ENCOUNTER — LAB ENCOUNTER (OUTPATIENT)
Dept: LAB | Facility: HOSPITAL | Age: 39
End: 2019-04-05
Attending: OBSTETRICS & GYNECOLOGY
Payer: COMMERCIAL

## 2019-04-05 DIAGNOSIS — Z01.818 PREOP TESTING: ICD-10-CM

## 2019-04-05 PROCEDURE — 86850 RBC ANTIBODY SCREEN: CPT

## 2019-04-05 PROCEDURE — 86900 BLOOD TYPING SEROLOGIC ABO: CPT

## 2019-04-05 PROCEDURE — 80048 BASIC METABOLIC PNL TOTAL CA: CPT

## 2019-04-05 PROCEDURE — 86901 BLOOD TYPING SEROLOGIC RH(D): CPT

## 2019-04-05 PROCEDURE — 36415 COLL VENOUS BLD VENIPUNCTURE: CPT

## 2019-04-08 ENCOUNTER — TELEPHONE (OUTPATIENT)
Dept: SURGERY | Facility: CLINIC | Age: 39
End: 2019-04-08

## 2019-04-15 ENCOUNTER — ANESTHESIA (OUTPATIENT)
Dept: SURGERY | Facility: HOSPITAL | Age: 39
DRG: 620 | End: 2019-04-15
Payer: COMMERCIAL

## 2019-04-15 ENCOUNTER — HOSPITAL ENCOUNTER (INPATIENT)
Facility: HOSPITAL | Age: 39
LOS: 1 days | Discharge: HOME | DRG: 620 | End: 2019-04-16
Attending: SINGLE SPECIALTY | Admitting: SINGLE SPECIALTY
Payer: COMMERCIAL

## 2019-04-15 ENCOUNTER — ANESTHESIA EVENT (OUTPATIENT)
Dept: SURGERY | Facility: HOSPITAL | Age: 39
DRG: 620 | End: 2019-04-15
Payer: COMMERCIAL

## 2019-04-15 DIAGNOSIS — E66.01 MORBID OBESITY (HCC): ICD-10-CM

## 2019-04-15 DIAGNOSIS — Z01.818 PREOP TESTING: Primary | ICD-10-CM

## 2019-04-15 DIAGNOSIS — IMO0001 IDDM (INSULIN DEPENDENT DIABETES MELLITUS): ICD-10-CM

## 2019-04-15 DIAGNOSIS — E78.00 HIGH CHOLESTEROL: ICD-10-CM

## 2019-04-15 PROCEDURE — 0D164ZA BYPASS STOMACH TO JEJUNUM, PERCUTANEOUS ENDOSCOPIC APPROACH: ICD-10-PCS | Performed by: SINGLE SPECIALTY

## 2019-04-15 PROCEDURE — 99232 SBSQ HOSP IP/OBS MODERATE 35: CPT | Performed by: HOSPITALIST

## 2019-04-15 RX ORDER — DEXTROSE MONOHYDRATE 25 G/50ML
50 INJECTION, SOLUTION INTRAVENOUS
Status: DISCONTINUED | OUTPATIENT
Start: 2019-04-15 | End: 2019-04-15 | Stop reason: HOSPADM

## 2019-04-15 RX ORDER — BUPIVACAINE HYDROCHLORIDE AND EPINEPHRINE 2.5; 5 MG/ML; UG/ML
INJECTION, SOLUTION INFILTRATION; PERINEURAL AS NEEDED
Status: DISCONTINUED | OUTPATIENT
Start: 2019-04-15 | End: 2019-04-15 | Stop reason: HOSPADM

## 2019-04-15 RX ORDER — KETAMINE HYDROCHLORIDE 50 MG/ML
INJECTION, SOLUTION, CONCENTRATE INTRAMUSCULAR; INTRAVENOUS AS NEEDED
Status: DISCONTINUED | OUTPATIENT
Start: 2019-04-15 | End: 2019-04-15 | Stop reason: SURG

## 2019-04-15 RX ORDER — MORPHINE SULFATE 2 MG/ML
2 INJECTION, SOLUTION INTRAMUSCULAR; INTRAVENOUS EVERY 10 MIN PRN
Status: DISCONTINUED | OUTPATIENT
Start: 2019-04-15 | End: 2019-04-15 | Stop reason: HOSPADM

## 2019-04-15 RX ORDER — GLYCOPYRROLATE 0.2 MG/ML
INJECTION INTRAMUSCULAR; INTRAVENOUS AS NEEDED
Status: DISCONTINUED | OUTPATIENT
Start: 2019-04-15 | End: 2019-04-15 | Stop reason: SURG

## 2019-04-15 RX ORDER — SODIUM CHLORIDE, SODIUM LACTATE, POTASSIUM CHLORIDE, CALCIUM CHLORIDE 600; 310; 30; 20 MG/100ML; MG/100ML; MG/100ML; MG/100ML
INJECTION, SOLUTION INTRAVENOUS CONTINUOUS
Status: DISCONTINUED | OUTPATIENT
Start: 2019-04-15 | End: 2019-04-15 | Stop reason: HOSPADM

## 2019-04-15 RX ORDER — HYDROCODONE BITARTRATE AND ACETAMINOPHEN 5; 325 MG/1; MG/1
1 TABLET ORAL AS NEEDED
Status: DISCONTINUED | OUTPATIENT
Start: 2019-04-15 | End: 2019-04-15 | Stop reason: HOSPADM

## 2019-04-15 RX ORDER — ALPRAZOLAM 0.25 MG/1
0.25 TABLET ORAL NIGHTLY PRN
Status: DISCONTINUED | OUTPATIENT
Start: 2019-04-15 | End: 2019-04-16

## 2019-04-15 RX ORDER — NALOXONE HYDROCHLORIDE 0.4 MG/ML
80 INJECTION, SOLUTION INTRAMUSCULAR; INTRAVENOUS; SUBCUTANEOUS AS NEEDED
Status: DISCONTINUED | OUTPATIENT
Start: 2019-04-15 | End: 2019-04-15 | Stop reason: HOSPADM

## 2019-04-15 RX ORDER — ONDANSETRON 2 MG/ML
INJECTION INTRAMUSCULAR; INTRAVENOUS AS NEEDED
Status: DISCONTINUED | OUTPATIENT
Start: 2019-04-15 | End: 2019-04-15 | Stop reason: SURG

## 2019-04-15 RX ORDER — ONDANSETRON 2 MG/ML
4 INJECTION INTRAMUSCULAR; INTRAVENOUS ONCE AS NEEDED
Status: COMPLETED | OUTPATIENT
Start: 2019-04-15 | End: 2019-04-15

## 2019-04-15 RX ORDER — ONDANSETRON 2 MG/ML
4 INJECTION INTRAMUSCULAR; INTRAVENOUS EVERY 6 HOURS PRN
Status: DISCONTINUED | OUTPATIENT
Start: 2019-04-15 | End: 2019-04-16

## 2019-04-15 RX ORDER — MORPHINE SULFATE 2 MG/ML
1 INJECTION, SOLUTION INTRAMUSCULAR; INTRAVENOUS EVERY 2 HOUR PRN
Status: DISCONTINUED | OUTPATIENT
Start: 2019-04-15 | End: 2019-04-16

## 2019-04-15 RX ORDER — HALOPERIDOL 5 MG/ML
0.25 INJECTION INTRAMUSCULAR ONCE AS NEEDED
Status: DISCONTINUED | OUTPATIENT
Start: 2019-04-15 | End: 2019-04-15 | Stop reason: HOSPADM

## 2019-04-15 RX ORDER — DEXAMETHASONE SODIUM PHOSPHATE 4 MG/ML
VIAL (ML) INJECTION AS NEEDED
Status: DISCONTINUED | OUTPATIENT
Start: 2019-04-15 | End: 2019-04-15 | Stop reason: SURG

## 2019-04-15 RX ORDER — CEFAZOLIN SODIUM/WATER 2 G/20 ML
2 SYRINGE (ML) INTRAVENOUS EVERY 8 HOURS
Status: COMPLETED | OUTPATIENT
Start: 2019-04-15 | End: 2019-04-16

## 2019-04-15 RX ORDER — SODIUM CHLORIDE 0.9 % (FLUSH) 0.9 %
10 SYRINGE (ML) INJECTION AS NEEDED
Status: DISCONTINUED | OUTPATIENT
Start: 2019-04-15 | End: 2019-04-16

## 2019-04-15 RX ORDER — DEXTROSE MONOHYDRATE 50 MG/ML
INJECTION, SOLUTION INTRAVENOUS CONTINUOUS
Status: DISCONTINUED | OUTPATIENT
Start: 2019-04-15 | End: 2019-04-16

## 2019-04-15 RX ORDER — HEPARIN SODIUM 5000 [USP'U]/ML
5000 INJECTION, SOLUTION INTRAVENOUS; SUBCUTANEOUS ONCE
Status: COMPLETED | OUTPATIENT
Start: 2019-04-15 | End: 2019-04-15

## 2019-04-15 RX ORDER — MORPHINE SULFATE 10 MG/ML
6 INJECTION, SOLUTION INTRAMUSCULAR; INTRAVENOUS EVERY 10 MIN PRN
Status: DISCONTINUED | OUTPATIENT
Start: 2019-04-15 | End: 2019-04-15 | Stop reason: HOSPADM

## 2019-04-15 RX ORDER — INSULIN ASPART 100 [IU]/ML
INJECTION, SOLUTION INTRAVENOUS; SUBCUTANEOUS ONCE
Status: COMPLETED | OUTPATIENT
Start: 2019-04-15 | End: 2019-04-15

## 2019-04-15 RX ORDER — FLUOXETINE HYDROCHLORIDE 20 MG/1
40 CAPSULE ORAL
Status: DISCONTINUED | OUTPATIENT
Start: 2019-04-16 | End: 2019-04-16

## 2019-04-15 RX ORDER — DEXTROSE MONOHYDRATE 25 G/50ML
50 INJECTION, SOLUTION INTRAVENOUS AS NEEDED
Status: DISCONTINUED | OUTPATIENT
Start: 2019-04-15 | End: 2019-04-16

## 2019-04-15 RX ORDER — FAMOTIDINE 20 MG/1
20 TABLET ORAL ONCE
Status: COMPLETED | OUTPATIENT
Start: 2019-04-15 | End: 2019-04-15

## 2019-04-15 RX ORDER — EPHEDRINE SULFATE 50 MG/ML
INJECTION, SOLUTION INTRAVENOUS AS NEEDED
Status: DISCONTINUED | OUTPATIENT
Start: 2019-04-15 | End: 2019-04-15 | Stop reason: SURG

## 2019-04-15 RX ORDER — LIDOCAINE HYDROCHLORIDE 10 MG/ML
INJECTION, SOLUTION EPIDURAL; INFILTRATION; INTRACAUDAL; PERINEURAL AS NEEDED
Status: DISCONTINUED | OUTPATIENT
Start: 2019-04-15 | End: 2019-04-15 | Stop reason: SURG

## 2019-04-15 RX ORDER — MORPHINE SULFATE 4 MG/ML
4 INJECTION, SOLUTION INTRAMUSCULAR; INTRAVENOUS EVERY 10 MIN PRN
Status: DISCONTINUED | OUTPATIENT
Start: 2019-04-15 | End: 2019-04-15 | Stop reason: HOSPADM

## 2019-04-15 RX ORDER — SODIUM CHLORIDE, SODIUM LACTATE, POTASSIUM CHLORIDE, CALCIUM CHLORIDE 600; 310; 30; 20 MG/100ML; MG/100ML; MG/100ML; MG/100ML
INJECTION, SOLUTION INTRAVENOUS CONTINUOUS
Status: DISCONTINUED | OUTPATIENT
Start: 2019-04-15 | End: 2019-04-16

## 2019-04-15 RX ORDER — PANTOPRAZOLE SODIUM 40 MG/1
40 TABLET, DELAYED RELEASE ORAL
Status: DISCONTINUED | OUTPATIENT
Start: 2019-04-16 | End: 2019-04-16

## 2019-04-15 RX ORDER — MORPHINE SULFATE 2 MG/ML
2 INJECTION, SOLUTION INTRAMUSCULAR; INTRAVENOUS EVERY 2 HOUR PRN
Status: DISCONTINUED | OUTPATIENT
Start: 2019-04-15 | End: 2019-04-16

## 2019-04-15 RX ORDER — GABAPENTIN 300 MG/1
300 CAPSULE ORAL ONCE
Status: COMPLETED | OUTPATIENT
Start: 2019-04-15 | End: 2019-04-15

## 2019-04-15 RX ORDER — ROCURONIUM BROMIDE 10 MG/ML
INJECTION, SOLUTION INTRAVENOUS AS NEEDED
Status: DISCONTINUED | OUTPATIENT
Start: 2019-04-15 | End: 2019-04-15 | Stop reason: SURG

## 2019-04-15 RX ORDER — HYDROCODONE BITARTRATE AND ACETAMINOPHEN 5; 325 MG/1; MG/1
2 TABLET ORAL AS NEEDED
Status: DISCONTINUED | OUTPATIENT
Start: 2019-04-15 | End: 2019-04-15 | Stop reason: HOSPADM

## 2019-04-15 RX ORDER — ACETAMINOPHEN 500 MG
1000 TABLET ORAL ONCE
Status: COMPLETED | OUTPATIENT
Start: 2019-04-15 | End: 2019-04-15

## 2019-04-15 RX ORDER — MORPHINE SULFATE 4 MG/ML
4 INJECTION, SOLUTION INTRAMUSCULAR; INTRAVENOUS EVERY 2 HOUR PRN
Status: DISCONTINUED | OUTPATIENT
Start: 2019-04-15 | End: 2019-04-16

## 2019-04-15 RX ADMIN — SODIUM CHLORIDE, SODIUM LACTATE, POTASSIUM CHLORIDE, CALCIUM CHLORIDE: 600; 310; 30; 20 INJECTION, SOLUTION INTRAVENOUS at 14:12:00

## 2019-04-15 RX ADMIN — EPHEDRINE SULFATE 10 MG: 50 INJECTION, SOLUTION INTRAVENOUS at 13:09:00

## 2019-04-15 RX ADMIN — ONDANSETRON 4 MG: 2 INJECTION INTRAMUSCULAR; INTRAVENOUS at 14:10:00

## 2019-04-15 RX ADMIN — LIDOCAINE HYDROCHLORIDE 50 MG: 10 INJECTION, SOLUTION EPIDURAL; INFILTRATION; INTRACAUDAL; PERINEURAL at 12:46:00

## 2019-04-15 RX ADMIN — ROCURONIUM BROMIDE 40 MG: 10 INJECTION, SOLUTION INTRAVENOUS at 12:48:00

## 2019-04-15 RX ADMIN — SODIUM CHLORIDE, SODIUM LACTATE, POTASSIUM CHLORIDE, CALCIUM CHLORIDE: 600; 310; 30; 20 INJECTION, SOLUTION INTRAVENOUS at 12:43:00

## 2019-04-15 RX ADMIN — GLYCOPYRROLATE 0.2 MG: 0.2 INJECTION INTRAMUSCULAR; INTRAVENOUS at 13:09:00

## 2019-04-15 RX ADMIN — SODIUM CHLORIDE, SODIUM LACTATE, POTASSIUM CHLORIDE, CALCIUM CHLORIDE: 600; 310; 30; 20 INJECTION, SOLUTION INTRAVENOUS at 13:17:00

## 2019-04-15 RX ADMIN — ROCURONIUM BROMIDE 5 MG: 10 INJECTION, SOLUTION INTRAVENOUS at 13:53:00

## 2019-04-15 RX ADMIN — DEXAMETHASONE SODIUM PHOSPHATE 4 MG: 4 MG/ML VIAL (ML) INJECTION at 13:06:00

## 2019-04-15 RX ADMIN — KETAMINE HYDROCHLORIDE 50 MG: 50 INJECTION, SOLUTION, CONCENTRATE INTRAMUSCULAR; INTRAVENOUS at 13:07:00

## 2019-04-15 NOTE — ANESTHESIA PROCEDURE NOTES
Airway  Urgency: elective      General Information and Staff    Patient location during procedure: OR  Anesthesiologist: Dillon Nick MD  Resident/CRNA: Seven Rodriguez CRNA  Performed: CRNA     Indications and Patient Condition  Indications for airwa

## 2019-04-15 NOTE — PROGRESS NOTES
Inpatient Bariatric Nutrition Note      Jason Kan is a 45year old female.     Procedure: Laparoscopic gastric bypass surgery  Surgery Date: 4/15/2019  Medical Diagnosis: Morbid obesity    Height:  Ht Readings from Last 1 Encounters:  04/15/19 : 5' HYDROcodone-acetaminophen 7.5-325 MG/15ML solution 10 mL, 10 mL, Oral, Q4H PRN **OR** HYDROcodone-acetaminophen 7.5-325 MG/15ML solution 20 mL, 20 mL, Oral, Q4H PRN  •  morphINE sulfate (PF) 2 MG/ML injection 1 mg, 1 mg, Intravenous, Q2H PRN **OR** morphIN

## 2019-04-15 NOTE — OPERATIVE REPORT
Clemente Carson / Kiley Le / Joni Sanchez / Mayito Vaughn / Molly Liao / Annamaria Gaastra - [phone]  [facility] Service [phone]        Date: 4/15/2019  Preop Diagnosis: Morbid Obesity secondary to excess calories   Postop D placed in a supine fashion on the table. A general anesthetic and the patient was intubated without incident. Pre-operative antibiotics were given. The abdomen was prepped and draped in the usual sterile fashion and a timeout was performed.     I gained mesenteric defect was closed with running 0 surgidac. I then turned my attention to creating the gastric pouch. Michelle Ruben was placed. A significant hiatal hernia was not seen. A window was made behind the stomach.   I then made a gastrotomy on the b area.

## 2019-04-15 NOTE — H&P
Arletta Schlatter / Antonio Dennison / Ashley Doll / Patricio Meigs / Rafaela Feldman / Dickson [facility] [phone]  Answering Service [phone]        Jaymie Aldana Patient Status:  Surgery Admit    1980 MRN [id number]   Locatio Admission:  ergocalciferol 47205 units Oral Cap Take 50,000 Units by mouth once a week. Disp:  Rfl:  4/14/2019   ALPRAZolam 0.25 MG Oral Tab Take 1 tablet (0.25 mg total) by mouth nightly as needed for Sleep (anxiety).  Disp: 30 tablet Rfl: 0 Past Month at Hypertension Mother    • Glaucoma Neg    • Macular degeneration Neg       Review of Systems      Constitutional: negative  HEENT: negative  Respiratory: negative  Cardiovascular: negative  Gastrointestinal: negative  Genitourinary: negative  Musculoskeleta Update (To be completed by Attending Surgeon day of surgery.)   There have been no significant clinical changes since the completion of the above H&P.

## 2019-04-15 NOTE — PROGRESS NOTES
[facility]    Progress Note    Micah Peralta Patient Status:  Inpatient    1980 MRN [id number]   Location [facility] 4W/SW/SE Attending Tarun Ardon MD   Hosp Day # 0 PCP Lars Avila MD        Subjective:     Nicki Moreno PROPPHYLAXISM, MONITOR HEMODYNAMIC STATUS.              Results:     Lab Results   Component Value Date    WBC 6.7 11/01/2018    HGB 14.3 11/01/2018    HCT 41.6 11/01/2018     11/01/2018    CREATSERUM 0.92 04/05/2019    BUN 21 (H) 04/05/2019    NA 13

## 2019-04-15 NOTE — ANESTHESIA POSTPROCEDURE EVALUATION
Patient: Shelly Jeong    Procedure Summary     Date:  04/15/19 Room / Location:  [facility] OR 01 / [facility] OR    Anesthesia Start:  1243 Anesthesia Stop:  9800    Procedure:  BARIATRIC GASTRIC BYPASS LAPAROSCOPIC SLICK  N-Y (N/A ) Diagnosis:  (morbid obe

## 2019-04-15 NOTE — ANESTHESIA PREPROCEDURE EVALUATION
Anesthesia PreOp Note    HPI:     Bryant Irving is a 45year old female who presents for preoperative consultation requested by:  Javier Stratton MD    Date of Surgery: 4/15/2019    Procedure(s):  BARIATRIC GASTRIC BYPASS LAPAROSCOPIC SLICK  N-Y  Indication: • TONSILLECTOMY           Medications Prior to Admission:  ergocalciferol 55929 units Oral Cap Take 50,000 Units by mouth once a week.  Disp:  Rfl:  4/14/2019   ALPRAZolam 0.25 MG Oral Tab Take 1 tablet (0.25 mg total) by mouth nightly as needed for Sleep PRN Krissy HANSEN MD    Or        Glucose-Vitamin C (DEX-4) 4-6 GM-MG chewable tab 4 tablet 4 tablet Oral Q15 Min PRN Eric Dudley MD    Or        dextrose 50 % injection 50 mL 50 mL Intravenous Q15 Min PRN Cora Lee MD    Or        glucose (DEX4) organization: Not on file        Attends meetings of clubs or organizations: Not on file        Relationship status: Not on file      Intimate partner violence:        Fear of current or ex partner: Not on file        Emotionally abused: Not on file damage if relevant, major complications, and any alternative forms of anesthetic management. All of the patient's questions were answered to the best of my ability. The patient desires the anesthetic management as planned.   Barbara Peters  4/15/2019 10:20

## 2019-04-16 VITALS
HEART RATE: 85 BPM | DIASTOLIC BLOOD PRESSURE: 59 MMHG | BODY MASS INDEX: 44.42 KG/M2 | RESPIRATION RATE: 16 BRPM | TEMPERATURE: 99 F | HEIGHT: 67 IN | OXYGEN SATURATION: 97 % | SYSTOLIC BLOOD PRESSURE: 127 MMHG | WEIGHT: 283 LBS

## 2019-04-16 PROCEDURE — 99239 HOSP IP/OBS DSCHRG MGMT >30: CPT | Performed by: HOSPITALIST

## 2019-04-16 RX ORDER — PANTOPRAZOLE SODIUM 40 MG/1
40 TABLET, DELAYED RELEASE ORAL
Qty: 30 TABLET | Refills: 3 | Status: SHIPPED | OUTPATIENT
Start: 2019-04-17 | End: 2019-12-20

## 2019-04-16 NOTE — PLAN OF CARE
Problem: Diabetes/Glucose Control  Goal: Glucose maintained within prescribed range  Description  INTERVENTIONS:  - Monitor Blood Glucose as ordered  - Assess for signs and symptoms of hyperglycemia and hypoglycemia  - Administer ordered medications to m Manage/alleviate anxiety  - Utilize distraction and/or relaxation techniques  - Monitor for opioid side effects  - Notify MD/LIP if interventions unsuccessful or patient reports new pain  - Anticipate increased pain with activity and pre-medicate as approp services based on physician/LIP order or complex needs related to functional status, cognitive ability or social support system  Outcome: Adequate for Discharge   Pt tolerates bariatric CLD. Reviewed guidelines and diet progression after discharge.  Brian Altamirano

## 2019-04-16 NOTE — DISCHARGE SUMMARY
[facility]    Discharge Summary    Aldo Gamez Patient Status:  Inpatient    1980 MRN [id number]   Location [facility] 4W/SW/SE Attending No att. providers found   Hosp Day # 1 PCP Lang Meadows MD     Date of Admissio discharge  -patient is instructed to check blood sugars ac/hs and call MD if blood sugars are more than 200    Morbid obesity ([facility]  S/P laparoscopic pauline-en-y gastric bypass  PAIN CONTROLLED  Liquid diet as instructed    Depression  -cont prozac    Dispo- DRISDOL/VITAMIN D2        LEVEMIR FLEXTOUCH 100 UNIT/ML Sopn  Generic drug:  insulin detemir        Melatonin 1 MG Caps        VICTOZA 18 MG/3ML Sopn  Generic drug:  Liraglutide              Where to Get Your Medications      Please  your prescripti

## 2019-04-16 NOTE — PLAN OF CARE
Problem: Diabetes/Glucose Control  Goal: Glucose maintained within prescribed range  Description  INTERVENTIONS:  - Monitor Blood Glucose as ordered  - Assess for signs and symptoms of hyperglycemia and hypoglycemia  - Administer ordered medications to m devices as appropriate  - Consider OT/PT consult to assist with strengthening/mobility  - Encourage toileting schedule  Outcome: Progressing     Problem: DISCHARGE PLANNING  Goal: Discharge to home or other facility with appropriate resources  Description

## 2019-04-16 NOTE — PROGRESS NOTES
Dany Vaughn / Sandra Srivastava / Monica Marinelli / Saundra Omalley / Olga Murillo / Rowena show - [phone]  Answering Service [phone]      Progress Note    Augustine Meraz Patient Status:  Inpatient    1980 MRN [id number] Glucose-Vitamin C (DEX-4) 4-6 GM-MG chewable tab 4 tablet 4 tablet Oral Q15 Min PRN   glucose (DEX4) oral liquid 15 g 15 g Oral Q15 Min PRN   Insulin Aspart Pen (NOVOLOG) 100 UNIT/ML flexpen 1-7 Units 1-7 Units Subcutaneous TID CC   insulin detemir (JORGE A

## 2019-04-18 ENCOUNTER — TELEPHONE (OUTPATIENT)
Dept: SURGERY | Facility: CLINIC | Age: 39
End: 2019-04-18

## 2019-04-18 NOTE — TELEPHONE ENCOUNTER
Denies fever, tachy, SOB. Ambulating well. Was able to get down 45 oz fluid yesterday, is struggling to get more than 15oz total down today.  Encouraged pt to persevere and continue taking small sips, call the surgeon's office if unable to get more than 32

## 2019-04-24 ENCOUNTER — OFFICE VISIT (OUTPATIENT)
Dept: SURGERY | Facility: CLINIC | Age: 39
End: 2019-04-24

## 2019-04-24 ENCOUNTER — DOCUMENTATION ONLY (OUTPATIENT)
Dept: SURGERY | Facility: CLINIC | Age: 39
End: 2019-04-24

## 2019-04-24 VITALS
HEIGHT: 67 IN | OXYGEN SATURATION: 99 % | WEIGHT: 272.13 LBS | RESPIRATION RATE: 16 BRPM | HEART RATE: 78 BPM | SYSTOLIC BLOOD PRESSURE: 133 MMHG | BODY MASS INDEX: 42.71 KG/M2 | DIASTOLIC BLOOD PRESSURE: 82 MMHG

## 2019-04-24 DIAGNOSIS — IMO0001 IDDM (INSULIN DEPENDENT DIABETES MELLITUS): ICD-10-CM

## 2019-04-24 DIAGNOSIS — Z51.81 ENCOUNTER FOR THERAPEUTIC DRUG MONITORING: ICD-10-CM

## 2019-04-24 DIAGNOSIS — E66.01 MORBID OBESITY (HCC): ICD-10-CM

## 2019-04-24 DIAGNOSIS — Z98.84 S/P GASTRIC BYPASS: ICD-10-CM

## 2019-04-24 DIAGNOSIS — E78.00 HIGH CHOLESTEROL: ICD-10-CM

## 2019-04-24 DIAGNOSIS — E55.9 VITAMIN D DEFICIENCY: Primary | ICD-10-CM

## 2019-04-24 NOTE — PROGRESS NOTES
First post op visit in office    Reviewed all  postoperative gastric bypass discharge instructions. *Covered fluid intake of 64 oz/day and to call if not meeting at 30-40 oz/day.   *Monitor incision for any redness, drainage, swelling and/or increased pa

## 2019-04-24 NOTE — PROGRESS NOTES
[address] [address]  Dept: [phone]    4/24/2019   Bariatric Patient Post-op Evaluation    Chief Complaint:  Morbid obesity, RYGB 4/15/19, preop 296 Spouse name: Not on file      Number of children: Not on file      Years of education: Not on file      Highest education level: Not on file    Tobacco Use      Smoking status: Never Smoker      Smokeless tobacco: Never Used    Substance and Sexual Activit negative  Genitourinary: negative  Musculoskeletal: negative  Neurological: negative  Psychological: negative  Endocrine: negative  Immunologic: negative  Integumentary: negative      Physical Exam:  Vital Signs:  /82   Pulse 78   Resp 16   Ht 67\"

## 2019-05-24 ENCOUNTER — OFFICE VISIT (OUTPATIENT)
Dept: SURGERY | Facility: CLINIC | Age: 39
End: 2019-05-24

## 2019-05-24 VITALS — HEIGHT: 67 IN | WEIGHT: 262.31 LBS | BODY MASS INDEX: 41.17 KG/M2

## 2019-05-24 DIAGNOSIS — E66.01 CLASS 3 SEVERE OBESITY DUE TO EXCESS CALORIES WITH SERIOUS COMORBIDITY AND BODY MASS INDEX (BMI) OF 40.0 TO 44.9 IN ADULT (HCC): Primary | ICD-10-CM

## 2019-05-24 PROCEDURE — 97803 MED NUTRITION INDIV SUBSEQ: CPT | Performed by: DIETITIAN, REGISTERED

## 2019-05-24 NOTE — PROGRESS NOTES
[facility] AND WE[facility] LOSS CLINIC  [address] [place] [address]  Dept: [phone]  Loc: [phone]    05/24/19      Bariatric Follow-up Nutrition Session    Augustine Meraz is a 45year old female.      Ass (H) 02/23/2016        Vitamins/Minerals:  Lab Results   Component Value Date    B12 261 11/01/2018     Lab Results   Component Value Date    [id number] 11.6 (L) 11/01/2018     Lab Results   Component Value Date/Time    THIAMINE 98 11/01/2018 12:51 PM      No ~48 ounces/day    Diet history:       Breakfast      AM Snack       Lunch     PM Snack     Dinner   Activia (4g) or 3/4 greek yogurt (light n' fit) (9g)  [place] ([facility]) or ham 3 slices (5g)  1/2 can of soup (corn chowder or baked potato) (7g)     Total Calories: intolerances, Activity level, Vitamin/mineral supplementation, Reinforce goals, Calorie/protein intake and Other:  nutrition labs at 3 months post-op    Mago Sheehan MS RD LDN  Face-to-face time spent with pt: 30 minutes

## 2019-06-12 ENCOUNTER — OFFICE VISIT (OUTPATIENT)
Dept: SURGERY | Facility: CLINIC | Age: 39
End: 2019-06-12

## 2019-06-12 VITALS
HEART RATE: 70 BPM | OXYGEN SATURATION: 96 % | RESPIRATION RATE: 16 BRPM | BODY MASS INDEX: 40.34 KG/M2 | WEIGHT: 257 LBS | SYSTOLIC BLOOD PRESSURE: 128 MMHG | HEIGHT: 67 IN | DIASTOLIC BLOOD PRESSURE: 84 MMHG

## 2019-06-12 DIAGNOSIS — Z98.84 S/P GASTRIC BYPASS: ICD-10-CM

## 2019-06-12 DIAGNOSIS — E55.9 VITAMIN D DEFICIENCY: ICD-10-CM

## 2019-06-12 DIAGNOSIS — E78.00 HIGH CHOLESTEROL: Primary | ICD-10-CM

## 2019-06-12 DIAGNOSIS — E66.01 MORBID OBESITY (HCC): ICD-10-CM

## 2019-06-12 DIAGNOSIS — IMO0001 IDDM (INSULIN DEPENDENT DIABETES MELLITUS): ICD-10-CM

## 2019-06-12 NOTE — PROGRESS NOTES
[address] [address]  Dept: [phone]    6/12/2019   Bariatric Patient Post-op Evaluation    Chief Complaint:  Morbid obesity, RYGB 4/15/19, preop 296 Socioeconomic History      Marital status: Single      Spouse name: Not on file      Number of children: Not on file      Years of education: Not on file      Highest education level: Not on file    Tobacco Use      Smoking status: Never Smoker      Smokel negative  Gastrointestinal: negative  Genitourinary: negative  Musculoskeletal: negative  Neurological: negative  Psychological: negative  Endocrine: negative  Immunologic: negative  Integumentary: negative      Physical Exam:  Vital Signs:  /84   Pu

## 2019-07-02 ENCOUNTER — OFFICE VISIT (OUTPATIENT)
Dept: INTERNAL MEDICINE CLINIC | Facility: CLINIC | Age: 39
End: 2019-07-02

## 2019-07-02 VITALS
SYSTOLIC BLOOD PRESSURE: 115 MMHG | WEIGHT: 249 LBS | HEIGHT: 67 IN | HEART RATE: 80 BPM | DIASTOLIC BLOOD PRESSURE: 76 MMHG | BODY MASS INDEX: 39.08 KG/M2
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
## 2019-07-02 PROBLEM — Z01.818 PREOP TESTING: Status: RESOLVED | Noted: 2019-04-15 | Resolved: 2019-07-02

## 2019-07-02 PROBLEM — E11.9 TYPE 2 DIABETES MELLITUS WITHOUT COMPLICATION, WITHOUT LONG-TERM CURRENT USE OF INSULIN (HCC): Status: ACTIVE | Noted: 2019-07-02

## 2019-07-02 PROBLEM — R63.2 INCREASED APPETITE: Status: RESOLVED | Noted: 2018-08-13 | Resolved: 2019-07-02

## 2019-07-02 PROCEDURE — 99213 OFFICE O/P EST LOW 20 MIN: CPT | Performed by: INTERNAL MEDICINE
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
## 2019-07-15 DIAGNOSIS — Z98.84 S/P GASTRIC BYPASS: Primary | ICD-10-CM

## 2019-07-17 ENCOUNTER — OFFICE VISIT (OUTPATIENT)
Dept: SURGERY | Facility: CLINIC | Age: 39
End: 2019-07-17

## 2019-07-17 VITALS
SYSTOLIC BLOOD PRESSURE: 103 MMHG | WEIGHT: 243 LBS | DIASTOLIC BLOOD PRESSURE: 72 MMHG | RESPIRATION RATE: 16 BRPM | HEART RATE: 78 BPM | BODY MASS INDEX: 38.14 KG/M2 | HEIGHT: 67 IN

## 2019-07-17 DIAGNOSIS — Z98.84 S/P GASTRIC BYPASS: ICD-10-CM

## 2019-07-17 DIAGNOSIS — E66.01 MORBID OBESITY (HCC): ICD-10-CM

## 2019-07-17 DIAGNOSIS — E55.9 VITAMIN D DEFICIENCY: ICD-10-CM

## 2019-07-17 DIAGNOSIS — E11.9 TYPE 2 DIABETES MELLITUS WITHOUT COMPLICATION, WITHOUT LONG-TERM CURRENT USE OF INSULIN (HCC): ICD-10-CM

## 2019-07-17 DIAGNOSIS — E78.00 HIGH CHOLESTEROL: Primary | ICD-10-CM

## 2019-07-17 NOTE — PROGRESS NOTES
[address] [place] [address]  [address] [address]  Dept: [phone]    7/17/2019   Bariatric Patient Post-op Evaluation    Chief Complaint:  Morbid obesity, RYGB 4/15/19, preop 296 tested herself and was negative.     Social History:  Social History    Socioeconomic History      Marital status: Single      Spouse name: Not on file      Number of children: Not on file      Years of education: Not on file      Highest education level: N negative      Physical Exam:  Vital Signs:  /72   Pulse 78   Resp 16   Ht 67\"   Wt 243 lb (110.2 kg)   BMI 38.06 kg/m²   BMI:  Body mass index is 38.06 kg/m².   General: no acute distress, well-nourished  HENT: normocephalic, atraumatic  Lung: breath

## 2019-08-01 ENCOUNTER — TELEPHONE (OUTPATIENT)
Dept: SURGERY | Facility: CLINIC | Age: 39
End: 2019-08-01

## 2019-08-22 RX ORDER — ALPRAZOLAM 0.25 MG/1
TABLET ORAL
Qty: 30 TABLET | Refills: 0 | OUTPATIENT
Start: 2019-08-22 | End: 2020-03-08

## 2019-08-22 NOTE — TELEPHONE ENCOUNTER
Controlled medication pending for review. If approved needs to be called in or faxed by on-site staff.     Last Rx: 03/07/19  LOV: 07/02/19

## 2019-08-23 ENCOUNTER — TELEPHONE (OUTPATIENT)
Dept: SURGERY | Facility: CLINIC | Age: 39
End: 2019-08-23

## 2019-08-23 NOTE — TELEPHONE ENCOUNTER
Pharmacist called to follow up on Alprazolam refill request. Noted med refill approved, reviewed prescription information with pharmacist.

## 2019-10-10 ENCOUNTER — TELEPHONE (OUTPATIENT)
Dept: SURGERY | Facility: CLINIC | Age: 39
End: 2019-10-10

## 2019-10-10 NOTE — TELEPHONE ENCOUNTER
Left message for patient to please call back and schedule 6 month follow up appointments with dietician, bariatrician & surgeon per protocol if has not done so already.

## 2019-12-20 ENCOUNTER — OFFICE VISIT (OUTPATIENT)
Dept: OBGYN CLINIC | Facility: CLINIC | Age: 39
End: 2019-12-20

## 2019-12-20 ENCOUNTER — LAB ENCOUNTER (OUTPATIENT)
Dept: LAB | Age: 39
End: 2019-12-20
Attending: OBSTETRICS & GYNECOLOGY
Payer: COMMERCIAL

## 2019-12-20 VITALS
HEIGHT: 66.5 IN | BODY MASS INDEX: 36.11 KG/M2 | SYSTOLIC BLOOD PRESSURE: 134 MMHG | WEIGHT: 227.38 LBS | DIASTOLIC BLOOD PRESSURE: 84 MMHG | HEART RATE: 78 BPM

## 2019-12-20 DIAGNOSIS — Z12.4 SCREENING FOR MALIGNANT NEOPLASM OF CERVIX: ICD-10-CM

## 2019-12-20 DIAGNOSIS — Z11.3 SCREEN FOR STD (SEXUALLY TRANSMITTED DISEASE): ICD-10-CM

## 2019-12-20 DIAGNOSIS — Z01.419 WELL WOMAN EXAM: Primary | ICD-10-CM

## 2019-12-20 DIAGNOSIS — Z30.09 COUNSELING FOR BIRTH CONTROL REGARDING INTRAUTERINE DEVICE (IUD): ICD-10-CM

## 2019-12-20 PROCEDURE — 86780 TREPONEMA PALLIDUM: CPT

## 2019-12-20 PROCEDURE — 36415 COLL VENOUS BLD VENIPUNCTURE: CPT

## 2019-12-20 PROCEDURE — 87389 HIV-1 AG W/HIV-1&-2 AB AG IA: CPT

## 2019-12-20 PROCEDURE — 80074 ACUTE HEPATITIS PANEL: CPT

## 2019-12-20 PROCEDURE — 99385 PREV VISIT NEW AGE 18-39: CPT | Performed by: OBSTETRICS & GYNECOLOGY

## 2019-12-20 RX ORDER — MISOPROSTOL 200 UG/1
TABLET ORAL
Qty: 1 TABLET | Refills: 0 | Status: SHIPPED | OUTPATIENT
Start: 2019-12-20 | End: 2020-10-05

## 2019-12-20 NOTE — H&P
HPI:  The patient is a 31-year-old sexually active female who presents for well woman examination today. New patient to the practice. Patient with monthly cycles. Sexually active and looking for birth control.   Patient has history of recent gastric by on file        Inability: Not on file      Transportation needs:        Medical: Not on file        Non-medical: Not on file    Tobacco Use      Smoking status: Never Smoker      Smokeless tobacco: Never Used    Substance and Sexual Activity      Alcohol u reddness  Vancomycin              ITCHING    Comment:Skin redness      Review of Systems:  Constitutional:  Denies fevers and chills   Cardiovascular:  denies chest pain or palpitations  Respiratory:  denies shortness of breath  Gastrointestinal:  denies h HEPATITIS PANEL, ACUTE (4); Future  -     CHLAMYDIA/GONOCOCCUS, RAMAKRISHNA; Future  -     TRICH VAG BY RAMAKRISHNA;  Future  -     TRICH VAG BY RAMAKRISHNA  -     CHLAMYDIA/GONOCOCCUS, RAMAKRISHNA    Counseling for birth control regarding intrauterine device (IUD)    Screening for mal

## 2019-12-30 ENCOUNTER — TELEPHONE (OUTPATIENT)
Dept: OBGYN CLINIC | Facility: CLINIC | Age: 39
End: 2019-12-30

## 2019-12-30 DIAGNOSIS — Z30.430 ENCOUNTER FOR INSERTION OF PARAGARD IUD: Primary | ICD-10-CM

## 2019-12-30 NOTE — TELEPHONE ENCOUNTER
Pt calling to schedule Paragard insertion. Pt reports lmp 12/30/19. Received V/O from [address] [place] [address] to add pt on 1/3/20 at 1130am for IUD insertion. Assisted pt with scheduling appt with [facility] on 1/3/20 at 1130 at [facility].  Advised pt she martha need to take [facility]

## 2020-01-03 ENCOUNTER — OFFICE VISIT (OUTPATIENT)
Dept: OBGYN CLINIC | Facility: CLINIC | Age: 40
End: 2020-01-03

## 2020-01-03 VITALS
DIASTOLIC BLOOD PRESSURE: 73 MMHG | BODY MASS INDEX: 36 KG/M2 | SYSTOLIC BLOOD PRESSURE: 112 MMHG | HEART RATE: 55 BPM | WEIGHT: 229.19 LBS

## 2020-01-03 DIAGNOSIS — Z30.430 ENCOUNTER FOR IUD INSERTION: Primary | ICD-10-CM

## 2020-01-03 DIAGNOSIS — Z32.00 PREGNANCY EXAMINATION OR TEST, PREGNANCY UNCONFIRMED: ICD-10-CM

## 2020-01-03 LAB — CONTROL LINE PRESENT WITH A CLEAR BACKGROUND (YES/NO): YES YES/NO

## 2020-01-03 PROCEDURE — 81025 URINE PREGNANCY TEST: CPT | Performed by: OBSTETRICS & GYNECOLOGY

## 2020-01-03 PROCEDURE — 58300 INSERT INTRAUTERINE DEVICE: CPT | Performed by: OBSTETRICS & GYNECOLOGY

## 2020-01-03 RX ORDER — COPPER 313.4 MG/1
1 INTRAUTERINE DEVICE INTRAUTERINE ONCE
Status: COMPLETED | OUTPATIENT
Start: 2020-01-03 | End: 2020-01-03

## 2020-01-03 RX ADMIN — COPPER 1 DEVICE: 313.4 INTRAUTERINE DEVICE INTRAUTERINE at 13:45:00

## 2020-01-03 NOTE — PROCEDURES
IUD Insertion     Pregnancy Results: negative from urine test   Birth control method(s) used: None. LMP: 12/30/19  Consent signed. Procedure discussed with the patient in detail including indication, risks, benefits, alternatives and complications.     P

## 2020-01-07 ENCOUNTER — TELEPHONE (OUTPATIENT)
Dept: CASE MANAGEMENT | Age: 40
End: 2020-01-07

## 2020-01-07 DIAGNOSIS — E11.9 TYPE 2 DIABETES MELLITUS WITHOUT COMPLICATION, WITHOUT LONG-TERM CURRENT USE OF INSULIN (HCC): Primary | ICD-10-CM

## 2020-01-07 NOTE — TELEPHONE ENCOUNTER
Discussed with patient the need for PCP follow up and DM eye exam.   PCP appt scheduled for 1/31/2020 & DM eye exam on 1/28/2020. Please sign pending referral. Thank you.

## 2020-02-05 ENCOUNTER — LAB ENCOUNTER (OUTPATIENT)
Dept: LAB | Facility: HOSPITAL | Age: 40
End: 2020-02-05
Attending: SINGLE SPECIALTY
Payer: COMMERCIAL

## 2020-02-05 DIAGNOSIS — E55.9 VITAMIN D DEFICIENCY: ICD-10-CM

## 2020-02-05 DIAGNOSIS — E11.9 TYPE 2 DIABETES MELLITUS WITHOUT COMPLICATION, WITHOUT LONG-TERM CURRENT USE OF INSULIN (HCC): ICD-10-CM

## 2020-02-05 DIAGNOSIS — Z98.84 S/P GASTRIC BYPASS: ICD-10-CM

## 2020-02-05 DIAGNOSIS — E66.01 MORBID OBESITY (HCC): ICD-10-CM

## 2020-02-05 DIAGNOSIS — E78.00 HIGH CHOLESTEROL: ICD-10-CM

## 2020-02-05 LAB
25(OH)D3 SERPL-MCNC: 15 NG/ML (ref 30–100)
ALBUMIN SERPL-MCNC: 3.3 G/DL (ref 3.4–5)
ALBUMIN/GLOB SERPL: 1.1 {RATIO} (ref 1–2)
ALP LIVER SERPL-CCNC: 54 U/L (ref 37–98)
ALT SERPL-CCNC: 25 U/L (ref 13–56)
ANION GAP SERPL CALC-SCNC: 3 MMOL/L (ref 0–18)
AST SERPL-CCNC: 14 U/L (ref 15–37)
BASOPHILS # BLD AUTO: 0.05 X10(3) UL (ref 0–0.2)
BASOPHILS NFR BLD AUTO: 0.9 %
BILIRUB SERPL-MCNC: 0.4 MG/DL (ref 0.1–2)
BUN BLD-MCNC: 14 MG/DL (ref 7–18)
BUN/CREAT SERPL: 16.9 (ref 10–20)
CALCIUM BLD-MCNC: 8.4 MG/DL (ref 8.5–10.1)
CHLORIDE SERPL-SCNC: 108 MMOL/L (ref 98–112)
CHOLEST SMN-MCNC: 163 MG/DL (ref ?–200)
CO2 SERPL-SCNC: 29 MMOL/L (ref 21–32)
CREAT BLD-MCNC: 0.83 MG/DL (ref 0.55–1.02)
DEPRECATED HBV CORE AB SER IA-ACNC: 10.8 NG/ML (ref 12–160)
DEPRECATED RDW RBC AUTO: 36.8 FL (ref 35.1–46.3)
EOSINOPHIL # BLD AUTO: 0.17 X10(3) UL (ref 0–0.7)
EOSINOPHIL NFR BLD AUTO: 2.9 %
ERYTHROCYTE [DISTWIDTH] IN BLOOD BY AUTOMATED COUNT: 11.2 % (ref 11–15)
EST. AVERAGE GLUCOSE BLD GHB EST-MCNC: 128 MG/DL (ref 68–126)
FOLATE SERPL-MCNC: 4.6 NG/ML (ref 8.7–?)
GLOBULIN PLAS-MCNC: 3 G/DL (ref 2.8–4.4)
GLUCOSE BLD-MCNC: 124 MG/DL (ref 70–99)
HAV IGM SER QL: 2 MG/DL (ref 1.6–2.6)
HBA1C MFR BLD HPLC: 6.1 % (ref ?–5.7)
HCT VFR BLD AUTO: 38 % (ref 35–48)
HDLC SERPL-MCNC: 57 MG/DL (ref 40–59)
HGB BLD-MCNC: 12.5 G/DL (ref 12–16)
IMM GRANULOCYTES # BLD AUTO: 0.02 X10(3) UL (ref 0–1)
IMM GRANULOCYTES NFR BLD: 0.3 %
IRON SATURATION: 15 % (ref 15–50)
IRON SERPL-MCNC: 58 UG/DL (ref 50–170)
LDLC SERPL CALC-MCNC: 86 MG/DL (ref ?–100)
LYMPHOCYTES # BLD AUTO: 1.64 X10(3) UL (ref 1–4)
LYMPHOCYTES NFR BLD AUTO: 28.3 %
M PROTEIN MFR SERPL ELPH: 6.3 G/DL (ref 6.4–8.2)
MCH RBC QN AUTO: 29.8 PG (ref 26–34)
MCHC RBC AUTO-ENTMCNC: 32.9 G/DL (ref 31–37)
MCV RBC AUTO: 90.5 FL (ref 80–100)
MONOCYTES # BLD AUTO: 0.47 X10(3) UL (ref 0.1–1)
MONOCYTES NFR BLD AUTO: 8.1 %
NEUTROPHILS # BLD AUTO: 3.45 X10 (3) UL (ref 1.5–7.7)
NEUTROPHILS # BLD AUTO: 3.45 X10(3) UL (ref 1.5–7.7)
NEUTROPHILS NFR BLD AUTO: 59.5 %
NONHDLC SERPL-MCNC: 106 MG/DL (ref ?–130)
OSMOLALITY SERPL CALC.SUM OF ELEC: 292 MOSM/KG (ref 275–295)
PATIENT FASTING Y/N/NP: YES
PATIENT FASTING Y/N/NP: YES
PHOSPHATE SERPL-MCNC: 4.2 MG/DL (ref 2.5–4.9)
PLATELET # BLD AUTO: 213 10(3)UL (ref 150–450)
POTASSIUM SERPL-SCNC: 4.2 MMOL/L (ref 3.5–5.1)
RBC # BLD AUTO: 4.2 X10(6)UL (ref 3.8–5.3)
SODIUM SERPL-SCNC: 140 MMOL/L (ref 136–145)
TOTAL IRON BINDING CAPACITY: 375 UG/DL (ref 240–450)
TRANSFERRIN SERPL-MCNC: 252 MG/DL (ref 200–360)
TRIGL SERPL-MCNC: 100 MG/DL (ref 30–149)
VIT B12 SERPL-MCNC: 229 PG/ML (ref 193–986)
VLDLC SERPL CALC-MCNC: 20 MG/DL (ref 0–30)
WBC # BLD AUTO: 5.8 X10(3) UL (ref 4–11)

## 2020-02-05 PROCEDURE — 82306 VITAMIN D 25 HYDROXY: CPT

## 2020-02-05 PROCEDURE — 36415 COLL VENOUS BLD VENIPUNCTURE: CPT

## 2020-02-05 PROCEDURE — 83735 ASSAY OF MAGNESIUM: CPT

## 2020-02-05 PROCEDURE — 82607 VITAMIN B-12: CPT

## 2020-02-05 PROCEDURE — 83540 ASSAY OF IRON: CPT

## 2020-02-05 PROCEDURE — 80061 LIPID PANEL: CPT

## 2020-02-05 PROCEDURE — 80053 COMPREHEN METABOLIC PANEL: CPT

## 2020-02-05 PROCEDURE — 83036 HEMOGLOBIN GLYCOSYLATED A1C: CPT

## 2020-02-05 PROCEDURE — 82728 ASSAY OF FERRITIN: CPT

## 2020-02-05 PROCEDURE — 84100 ASSAY OF PHOSPHORUS: CPT

## 2020-02-05 PROCEDURE — 84466 ASSAY OF TRANSFERRIN: CPT

## 2020-02-05 PROCEDURE — 85025 COMPLETE CBC W/AUTO DIFF WBC: CPT

## 2020-02-05 PROCEDURE — 82746 ASSAY OF FOLIC ACID SERUM: CPT

## 2020-02-05 PROCEDURE — 84425 ASSAY OF VITAMIN B-1: CPT

## 2020-02-06 ENCOUNTER — OFFICE VISIT (OUTPATIENT)
Dept: OBGYN CLINIC | Facility: CLINIC | Age: 40
End: 2020-02-06

## 2020-02-06 VITALS
HEART RATE: 70 BPM | WEIGHT: 229 LBS | SYSTOLIC BLOOD PRESSURE: 109 MMHG | DIASTOLIC BLOOD PRESSURE: 75 MMHG | BODY MASS INDEX: 36 KG/M2

## 2020-02-06 DIAGNOSIS — Z30.431 IUD CHECK UP: Primary | ICD-10-CM

## 2020-02-06 PROCEDURE — 99213 OFFICE O/P EST LOW 20 MIN: CPT | Performed by: OBSTETRICS & GYNECOLOGY

## 2020-02-06 RX ORDER — FLUOXETINE HYDROCHLORIDE 40 MG/1
40 CAPSULE ORAL DAILY
COMMUNITY
End: 2020-04-07

## 2020-02-06 NOTE — H&P
HPI:  The patient is a 27-year-old female status post ParaGard IUD insertion 1 month ago here for follow-up visit. No major complaints. Random mild cramping at times. 9 days light spotting with first menses since ParaGard placed.   No issues with interco Not on file      Transportation needs:        Medical: Not on file        Non-medical: Not on file    Tobacco Use      Smoking status: Never Smoker      Smokeless tobacco: Never Used    Substance and Sexual Activity      Alcohol use: Yes        Comment: so skin  Other                   RASH    Comment:dermabond  Sulfa Antibiotics       ITCHING    Comment:Skin reddness  Vancomycin              ITCHING    Comment:Skin redness      Review of Systems:  Constitutional:  Denies fevers and chills   Cardiovascular:

## 2020-02-07 LAB — VITAMIN B1 (THIAMINE), WHOLE B: 101 NMOL/L

## 2020-02-12 ENCOUNTER — TELEPHONE (OUTPATIENT)
Dept: SURGERY | Facility: CLINIC | Age: 40
End: 2020-02-12

## 2020-02-13 ENCOUNTER — OFFICE VISIT (OUTPATIENT)
Dept: INTERNAL MEDICINE CLINIC | Facility: CLINIC | Age: 40
End: 2020-02-13

## 2020-02-13 VITALS
DIASTOLIC BLOOD PRESSURE: 73 MMHG | SYSTOLIC BLOOD PRESSURE: 111 MMHG | WEIGHT: 227 LBS | BODY MASS INDEX: 36.05 KG/M2 | HEIGHT: 66.5 IN | HEART RATE: 77 BPM

## 2020-02-13 DIAGNOSIS — Z23 NEED FOR INFLUENZA VACCINATION: Primary | ICD-10-CM

## 2020-02-13 DIAGNOSIS — E11.9 TYPE 2 DIABETES MELLITUS WITHOUT COMPLICATION, WITHOUT LONG-TERM CURRENT USE OF INSULIN (HCC): ICD-10-CM

## 2020-02-13 PROCEDURE — 90686 IIV4 VACC NO PRSV 0.5 ML IM: CPT | Performed by: INTERNAL MEDICINE

## 2020-02-13 PROCEDURE — 99213 OFFICE O/P EST LOW 20 MIN: CPT | Performed by: INTERNAL MEDICINE

## 2020-02-13 PROCEDURE — 90471 IMMUNIZATION ADMIN: CPT | Performed by: INTERNAL MEDICINE

## 2020-02-13 NOTE — PROGRESS NOTES
Jelani Hughes is a 44year old female. Patient presents with:  Diabetes    HPI:   80-year-old female with a past medical history of diabetes, anxiety here for follow-up. She is not taking any medicine for diabetes after her weight loss surgery.   She repo retinal detachment- Dr. Arnoldo Paez   • TONSILLECTOMY        Social History:  Social History    Tobacco Use      Smoking status: Never Smoker      Smokeless tobacco: Never Used    Alcohol use: Yes      Comment: social-rare    Drug use: No     Family History present. Pulmonary/Chest: Effort normal and breath sounds normal. She has no wheezes. She has no rales. Abdominal: Soft. Bowel sounds are normal. There is no rebound and no guarding. Neurological: She is alert and oriented to person, place, and time.

## 2020-02-15 ENCOUNTER — PATIENT MESSAGE (OUTPATIENT)
Dept: OBGYN CLINIC | Facility: CLINIC | Age: 40
End: 2020-02-15

## 2020-02-17 NOTE — TELEPHONE ENCOUNTER
From: Nasima Somers  To: Dasha Ulloa DO  Sent: 2/15/2020 2:50 PM CST  Subject: Visit Follow-up Question    This is kind of awkward, but I've noticed a vaginal smell.  This has never been a problem for me before (aside from a few yeast infections) s

## 2020-02-25 ENCOUNTER — OFFICE VISIT (OUTPATIENT)
Dept: OBGYN CLINIC | Facility: CLINIC | Age: 40
End: 2020-02-25

## 2020-02-25 VITALS
SYSTOLIC BLOOD PRESSURE: 118 MMHG | HEART RATE: 57 BPM | WEIGHT: 225.81 LBS | BODY MASS INDEX: 36 KG/M2 | DIASTOLIC BLOOD PRESSURE: 81 MMHG

## 2020-02-25 DIAGNOSIS — N89.8 VAGINAL DISCHARGE: Primary | ICD-10-CM

## 2020-02-25 PROCEDURE — 99213 OFFICE O/P EST LOW 20 MIN: CPT | Performed by: OBSTETRICS & GYNECOLOGY

## 2020-02-25 NOTE — H&P
HPI:    The patient is a 45 yo F c/o vaginal discharge with odor x 2 weeks. No recent abx, changes in soaps/detergent, or douching.       Reviewed medical and surgical history below       OBSTETRICS HISTORY:  OB History     T0    L0    SAB0  TA Alcohol use: Yes        Comment: social-rare      Drug use: No      Sexual activity: Not on file    Lifestyle      Physical activity:        Days per week: Not on file        Minutes per session: Not on file      Stress: Not on file    Relationships      S Comment:Skin reddness  Vancomycin              ITCHING    Comment:Skin redness      Review of Systems:  Constitutional:  Denies fevers and chills   Cardiovascular:  denies chest pain or palpitations  Respiratory:  denies shortness of breath  Gastrointestin

## 2020-02-26 ENCOUNTER — TELEPHONE (OUTPATIENT)
Dept: OBGYN CLINIC | Facility: CLINIC | Age: 40
End: 2020-02-26

## 2020-02-26 RX ORDER — METRONIDAZOLE 500 MG/1
500 TABLET ORAL 2 TIMES DAILY
Qty: 14 TABLET | Refills: 0 | Status: SHIPPED | OUTPATIENT
Start: 2020-02-26 | End: 2020-03-04

## 2020-02-26 NOTE — TELEPHONE ENCOUNTER
Pt informed of MAZs recs below and verbalized understanding. Pt chose Flagyl and RX has been sent to pharmacy.

## 2020-02-27 LAB
GENITAL VAGINOSIS SCREEN: POSITIVE
TRICHOMONAS SCREEN: NEGATIVE

## 2020-03-07 NOTE — TELEPHONE ENCOUNTER
Controlled medication pending for review. Please change to phone in, fax, or print script if not being sent electronically.     Last Rx:8/22/19  LOV: 2/13/20    Requested Prescriptions   Pending Prescriptions Disp Refills   • ALPRAZOLAM 0.25 MG Oral Tab [P

## 2020-03-08 RX ORDER — ALPRAZOLAM 0.25 MG/1
TABLET ORAL
Qty: 30 TABLET | Refills: 0 | Status: SHIPPED | OUTPATIENT
Start: 2020-03-08 | End: 2020-11-03

## 2020-03-09 ENCOUNTER — TELEPHONE (OUTPATIENT)
Dept: INTERNAL MEDICINE CLINIC | Facility: CLINIC | Age: 40
End: 2020-03-09

## 2020-04-07 RX ORDER — FLUOXETINE HYDROCHLORIDE 40 MG/1
CAPSULE ORAL
Qty: 30 CAPSULE | Refills: 3 | Status: SHIPPED | OUTPATIENT
Start: 2020-04-07 | End: 2020-10-05

## 2020-05-08 NOTE — TELEPHONE ENCOUNTER
From: Reg Muro  To: Avery Taylor MD  Sent: 5/8/2020 4:44 PM CDT  Subject: Referral Request    Since my mental health coverage is through [address] (I think that's what it's called. Nida Richard  it's through my HMO), I didn't contact you for a ref

## 2020-05-11 NOTE — TELEPHONE ENCOUNTER
Dr. Zachery Schmidt, patient is requesting a referral to Dr. Joao Lara. Referral has been pended, please advise.      Please reply to pool: [facility]

## 2020-05-11 NOTE — TELEPHONE ENCOUNTER
Why is she seeing this MD for? What kind of dr is she?     Please let me know Then I will approve if needed Thanks    Jessi Oliva

## 2020-05-12 NOTE — TELEPHONE ENCOUNTER
Per patient Dr Trena Skiff is a psychiatrist for depression, anxiety, patient states that she email Dr Navdeep Hebert already thru [facility] [address] last 05/08/2020 and patient saw the psychiatrist last 05/08/2020 also. Any question can call patient @ [phone].

## 2020-05-14 NOTE — TELEPHONE ENCOUNTER
From  Rahul Sagastume To  Brayan Youngleonela and Delivered  5/13/2020  8:52 AM   Last Read in [address]  5/13/2020  9:00 AM by Micah Peralta

## 2020-07-08 ENCOUNTER — TELEPHONE (OUTPATIENT)
Dept: INTERNAL MEDICINE CLINIC | Facility: CLINIC | Age: 40
End: 2020-07-08

## 2020-07-08 NOTE — TELEPHONE ENCOUNTER
Pt has been informed that she is due for DM follow-up, pt voiced understanding and will call back to schedule appt.

## 2020-10-05 ENCOUNTER — OFFICE VISIT (OUTPATIENT)
Dept: INTERNAL MEDICINE CLINIC | Facility: CLINIC | Age: 40
End: 2020-10-05

## 2020-10-05 VITALS
TEMPERATURE: 97 F | DIASTOLIC BLOOD PRESSURE: 76 MMHG | BODY MASS INDEX: 36.26 KG/M2 | WEIGHT: 225.63 LBS | OXYGEN SATURATION: 98 % | SYSTOLIC BLOOD PRESSURE: 134 MMHG | HEIGHT: 66 IN | HEART RATE: 52 BPM

## 2020-10-05 DIAGNOSIS — E11.9 TYPE 2 DIABETES MELLITUS WITHOUT COMPLICATION, WITHOUT LONG-TERM CURRENT USE OF INSULIN (HCC): ICD-10-CM

## 2020-10-05 DIAGNOSIS — Z12.31 BREAST CANCER SCREENING BY MAMMOGRAM: Primary | ICD-10-CM

## 2020-10-05 DIAGNOSIS — Z23 NEED FOR INFLUENZA VACCINATION: ICD-10-CM

## 2020-10-05 DIAGNOSIS — R53.83 FATIGUE, UNSPECIFIED TYPE: ICD-10-CM

## 2020-10-05 DIAGNOSIS — F32.0 CURRENT MILD EPISODE OF MAJOR DEPRESSIVE DISORDER WITHOUT PRIOR EPISODE (HCC): ICD-10-CM

## 2020-10-05 DIAGNOSIS — E55.9 VITAMIN D DEFICIENCY: ICD-10-CM

## 2020-10-05 PROBLEM — F32.9 CURRENT EPISODE OF MAJOR DEPRESSIVE DISORDER WITHOUT PRIOR EPISODE: Status: ACTIVE | Noted: 2020-10-05

## 2020-10-05 PROCEDURE — 36415 COLL VENOUS BLD VENIPUNCTURE: CPT | Performed by: INTERNAL MEDICINE

## 2020-10-05 PROCEDURE — 3075F SYST BP GE 130 - 139MM HG: CPT | Performed by: INTERNAL MEDICINE

## 2020-10-05 PROCEDURE — 3078F DIAST BP <80 MM HG: CPT | Performed by: INTERNAL MEDICINE

## 2020-10-05 PROCEDURE — 3008F BODY MASS INDEX DOCD: CPT | Performed by: INTERNAL MEDICINE

## 2020-10-05 PROCEDURE — 90686 IIV4 VACC NO PRSV 0.5 ML IM: CPT | Performed by: INTERNAL MEDICINE

## 2020-10-05 PROCEDURE — 99214 OFFICE O/P EST MOD 30 MIN: CPT | Performed by: INTERNAL MEDICINE

## 2020-10-05 PROCEDURE — 90471 IMMUNIZATION ADMIN: CPT | Performed by: INTERNAL MEDICINE

## 2020-10-05 RX ORDER — FLUOXETINE HYDROCHLORIDE 20 MG/1
CAPSULE ORAL
COMMUNITY
Start: 2020-08-20 | End: 2020-10-22

## 2020-10-06 NOTE — PROGRESS NOTES
Peyman Greene is a 36year old female. Patient presents with: Other: Low energy  Immunization/Injection: Flu    HPI:   77-year-old female with a past medical history of diet-controlled diabetes, depression here for follow-up.   She reports that she feels Smoker      Smokeless tobacco: Never Used    Alcohol use: Yes      Comment: social-rare    Drug use: No     Family History   Problem Relation Age of Onset   • Diabetes Father    • Heart Disorder Father         CABG x3   • Hypertension Mother    • Glaucoma No murmur heard. Edema not present. Pulmonary/Chest: Effort normal and breath sounds normal. She has no rales. Abdominal: Soft. Bowel sounds are normal.   Neurological: She is alert and oriented to person, place, and time.  No cranial nerve deficit o

## 2020-11-03 RX ORDER — ALPRAZOLAM 0.25 MG/1
TABLET ORAL
Qty: 30 TABLET | Refills: 0 | Status: SHIPPED | OUTPATIENT
Start: 2020-11-03 | End: 2021-11-16

## 2020-11-04 NOTE — TELEPHONE ENCOUNTER
I have approved her alprazolam.  Please remind patient to complete her mammography in her earliest convenience.   Thank you

## 2020-11-04 NOTE — TELEPHONE ENCOUNTER
Message left for pt that rx for alprazolam has been sent to her pharmacy and for pt to please complete mammogram this month.

## 2020-11-07 ENCOUNTER — NURSE TRIAGE (OUTPATIENT)
Dept: INTERNAL MEDICINE CLINIC | Facility: CLINIC | Age: 40
End: 2020-11-07

## 2020-11-07 NOTE — TELEPHONE ENCOUNTER
Scheduled doximity visit 11/9/2020 Dr Artist Sacks 5:50 pm. Advised video visit process, billing, if dyspnea/chest tightness to go to ER; patient verbalized understanding. Yesterday, fever 100.5, chills, aches.  No cough, shortness of breath, chest tightnes

## 2020-11-09 ENCOUNTER — TELEMEDICINE (OUTPATIENT)
Dept: INTERNAL MEDICINE CLINIC | Facility: CLINIC | Age: 40
End: 2020-11-09

## 2020-11-09 DIAGNOSIS — Z20.822 SUSPECTED COVID-19 VIRUS INFECTION: Primary | ICD-10-CM

## 2020-11-09 PROCEDURE — 99213 OFFICE O/P EST LOW 20 MIN: CPT | Performed by: INTERNAL MEDICINE

## 2020-11-10 NOTE — PROGRESS NOTES
Karen Sultana is a 36year old female. No chief complaint on file. Virtual/Telephone Check-In    Karen Sultana verbally consents to a Virtual/Telephone Check-In service on 11/09/20.   Patient has been referred to the [facility] website at [email or web address] REPAIR DETACH RETINA,SCLERAL BUCKLE Left 03/05/2018   • REPAIR RETINAL DETACH, SCLERAL BUCKLE - OD - RIGHT EYE Right 06/08/2017    Scleral buckle OD (SB, cryo, drainage of SRF) for macula off retinal detachment- Dr. Keturah Leroy   • [address] convinced with her symptoms. However because of the pandemic and increased positivity rate, I have ordered testing for her. I have discussed standard precautions that include self quarantine, face masking, and social distancing.   I have encouraged her to

## 2020-11-11 ENCOUNTER — LAB ENCOUNTER (OUTPATIENT)
Dept: LAB | Age: 40
End: 2020-11-11
Attending: INTERNAL MEDICINE
Payer: COMMERCIAL

## 2020-11-11 DIAGNOSIS — Z20.822 EXPOSURE TO COVID-19 VIRUS: Primary | ICD-10-CM

## 2020-11-17 ENCOUNTER — TELEPHONE (OUTPATIENT)
Dept: SURGERY | Facility: CLINIC | Age: 40
End: 2020-11-17

## 2020-12-09 ENCOUNTER — TELEMEDICINE (OUTPATIENT)
Dept: INTERNAL MEDICINE CLINIC | Facility: CLINIC | Age: 40
End: 2020-12-09

## 2020-12-09 ENCOUNTER — NURSE TRIAGE (OUTPATIENT)
Dept: INTERNAL MEDICINE CLINIC | Facility: CLINIC | Age: 40
End: 2020-12-09

## 2020-12-09 DIAGNOSIS — L03.119 CELLULITIS OF UPPER EXTREMITY, UNSPECIFIED LATERALITY: Primary | ICD-10-CM

## 2020-12-09 PROCEDURE — 99213 OFFICE O/P EST LOW 20 MIN: CPT | Performed by: INTERNAL MEDICINE

## 2020-12-09 RX ORDER — AMOXICILLIN AND CLAVULANATE POTASSIUM 875; 125 MG/1; MG/1
1 TABLET, FILM COATED ORAL 2 TIMES DAILY
Qty: 14 TABLET | Refills: 0 | Status: SHIPPED | OUTPATIENT
Start: 2020-12-09 | End: 2020-12-16

## 2020-12-09 NOTE — TELEPHONE ENCOUNTER
----- Message from [address] [place] [address]. Lisa sent at 12/8/2020  9:36 PM CST -----  Regarding: Non-Urgent Medical Question  Contact: [phone]  My puppy's tooth scratched my finger a few days ago. It's now red and swollen and warm.  I put Neosporin on it this mor
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
## 2021-01-03 RX ORDER — ERGOCALCIFEROL 1.25 MG/1
CAPSULE ORAL
Qty: 13 CAPSULE | Refills: 0 | Status: SHIPPED | OUTPATIENT
Start: 2021-01-03 | End: 2021-04-08

## 2021-02-26 NOTE — PROGRESS NOTES
Rose Jurado is a 36year old female. No chief complaint on file. Virtual/Telephone Check-In    Rose Jurado verbally consents to a Virtual/Telephone Check-In service on 02/26/21.   Patient has been referred to the [facility] website at [email or web address] 2/14/2017    Performed by Lambert Trujillo MD at [facility] MAIN OR   • KNEE ARTHROSCOPY      left knee   • LAP GASTRIC BYPASS/SLICK-EN-Y     • OTHER SURGICAL HISTORY Right 02/2017    vein ablation   • REPAIR DETACH RETINA,SCLERAL BUCKLE Left 03/05/2018   • REP normal.  No conversational dyspnea. Physical Exam      ASSESSMENT AND PLAN:   1. Anxiety   -She is already on high-dose of fluoxetine. She already talked with the psychologist.  She was seen by psychiatrist as well.   I have discussed with her regarding n

## 2021-03-25 ENCOUNTER — IMMUNIZATION (OUTPATIENT)
Dept: LAB | Age: 41
End: 2021-03-25
Attending: HOSPITALIST
Payer: COMMERCIAL

## 2021-03-25 DIAGNOSIS — Z23 NEED FOR VACCINATION: Primary | ICD-10-CM

## 2021-03-25 PROCEDURE — 0001A SARSCOV2 VAC 30MCG/0.3ML IM: CPT

## 2021-03-29 ENCOUNTER — TELEPHONE (OUTPATIENT)
Dept: INTERNAL MEDICINE CLINIC | Facility: CLINIC | Age: 41
End: 2021-03-29

## 2021-03-29 ENCOUNTER — MED REC SCAN ONLY (OUTPATIENT)
Dept: INTERNAL MEDICINE CLINIC | Facility: CLINIC | Age: 41
End: 2021-03-29

## 2021-04-07 ENCOUNTER — TELEPHONE (OUTPATIENT)
Dept: SURGERY | Facility: CLINIC | Age: 41
End: 2021-04-07

## 2021-04-08 RX ORDER — ERGOCALCIFEROL 1.25 MG/1
CAPSULE ORAL
Qty: 13 CAPSULE | Refills: 0 | Status: SHIPPED | OUTPATIENT
Start: 2021-04-08 | End: 2021-10-18

## 2021-04-15 ENCOUNTER — IMMUNIZATION (OUTPATIENT)
Dept: LAB | Age: 41
End: 2021-04-15
Attending: HOSPITALIST
Payer: COMMERCIAL

## 2021-04-15 DIAGNOSIS — Z23 NEED FOR VACCINATION: Primary | ICD-10-CM

## 2021-04-15 PROCEDURE — 0002A SARSCOV2 VAC 30MCG/0.3ML IM: CPT

## 2021-07-19 ENCOUNTER — TELEPHONE (OUTPATIENT)
Dept: INTERNAL MEDICINE CLINIC | Facility: CLINIC | Age: 41
End: 2021-07-19

## 2021-08-03 ENCOUNTER — TELEPHONE (OUTPATIENT)
Dept: INTERNAL MEDICINE CLINIC | Facility: CLINIC | Age: 41
End: 2021-08-03

## 2021-08-03 NOTE — TELEPHONE ENCOUNTER
Patient informed is due for diabetes follow up appointment with PCP, states needs to check schedule and will call back or schedule thru MyChart.

## 2021-09-16 ENCOUNTER — TELEPHONE (OUTPATIENT)
Dept: CASE MANAGEMENT | Age: 41
End: 2021-09-16

## 2021-09-16 NOTE — TELEPHONE ENCOUNTER
Patient is due for physical exam w/PCP & labs. Discussed in detail w/patient. Patient declined to schedule appt.

## 2021-10-18 NOTE — PROGRESS NOTES
Tha Delcid is a 39year old female. No chief complaint on file. Virtual/Telephone Check-In    Tha Delcid verbally consents to a Virtual/Telephone Check-In service on 10/18/21.   Patient has been referred to the [facility] website at [email or web address] RETINA,SCLERAL BUCKLE Left 03/05/2018   • REPAIR RETINAL DETACH, SCLERAL BUCKLE - OD - RIGHT EYE Right 06/08/2017    Scleral buckle OD (SB, cryo, drainage of SRF) for macula off retinal detachment- Dr. Nolan Padilla   • TONSILLECTOMY        Social History:  Soc very comfortable. She does not look depressed while talking on the phone. Physical Exam       ASSESSMENT AND PLAN:   1. Current mild episode of major depressive disorder without prior episode [facility])  She was not taking her medications previously.   She xiong

## 2021-10-19 ENCOUNTER — LAB ENCOUNTER (OUTPATIENT)
Dept: LAB | Facility: HOSPITAL | Age: 41
End: 2021-10-19
Attending: INTERNAL MEDICINE
Payer: COMMERCIAL

## 2021-10-19 DIAGNOSIS — Z00.00 ADULT GENERAL MEDICAL EXAM: ICD-10-CM

## 2021-10-19 PROCEDURE — 82570 ASSAY OF URINE CREATININE: CPT

## 2021-10-19 PROCEDURE — 83036 HEMOGLOBIN GLYCOSYLATED A1C: CPT

## 2021-10-19 PROCEDURE — 36415 COLL VENOUS BLD VENIPUNCTURE: CPT

## 2021-10-19 PROCEDURE — 82043 UR ALBUMIN QUANTITATIVE: CPT

## 2021-10-19 PROCEDURE — 80053 COMPREHEN METABOLIC PANEL: CPT

## 2021-10-19 PROCEDURE — 80061 LIPID PANEL: CPT

## 2021-10-19 PROCEDURE — 85027 COMPLETE CBC AUTOMATED: CPT

## 2021-10-19 PROCEDURE — 84443 ASSAY THYROID STIM HORMONE: CPT

## 2021-10-19 PROCEDURE — 82607 VITAMIN B-12: CPT

## 2021-10-19 PROCEDURE — 82306 VITAMIN D 25 HYDROXY: CPT

## 2021-10-22 ENCOUNTER — PATIENT MESSAGE (OUTPATIENT)
Dept: INTERNAL MEDICINE CLINIC | Facility: CLINIC | Age: 41
End: 2021-10-22

## 2021-10-22 DIAGNOSIS — L65.9 HAIR LOSS: Primary | ICD-10-CM

## 2021-10-22 NOTE — TELEPHONE ENCOUNTER
From: Jaymie Like  To: Dominick Ghotra MD  Sent: 10/22/2021 9:00 AM CDT  Subject: dermatologist referral     can you refer me to a dermatologist or some other specialist for my hair loss? it’s never been so bad before.

## 2021-11-05 ENCOUNTER — OFFICE VISIT (OUTPATIENT)
Dept: DERMATOLOGY CLINIC | Facility: CLINIC | Age: 41
End: 2021-11-05

## 2021-11-05 DIAGNOSIS — L91.8 SKIN TAG: ICD-10-CM

## 2021-11-05 DIAGNOSIS — L65.9 HAIR LOSS: Primary | ICD-10-CM

## 2021-11-05 PROCEDURE — 11200 RMVL SKIN TAGS UP TO&INC 15: CPT | Performed by: PHYSICIAN ASSISTANT

## 2021-11-05 PROCEDURE — 99203 OFFICE O/P NEW LOW 30 MIN: CPT | Performed by: PHYSICIAN ASSISTANT

## 2021-11-05 RX ORDER — CLOBETASOL PROPIONATE 0.46 MG/ML
1 SOLUTION TOPICAL
Qty: 60 ML | Refills: 3 | Status: SHIPPED | OUTPATIENT
Start: 2021-11-05

## 2021-11-05 RX ORDER — MELATONIN
325
COMMUNITY

## 2021-11-05 RX ORDER — PYRIDOXINE HCL (VITAMIN B6) 50 MG
TABLET ORAL
COMMUNITY

## 2021-11-05 RX ORDER — ACETAMINOPHEN 160 MG
2000 TABLET,DISINTEGRATING ORAL DAILY
COMMUNITY

## 2021-11-05 NOTE — PROCEDURES
Procedure:  Skin tag removal  With the patient is a supine position, the skin was scrubbed with alcohol. Anesthesia was obtained by injecting 2 mL of 1% Xylocaine with Epinephrine around the right eye lateral side.  Scissors and forceps were used to remove

## 2021-11-05 NOTE — PROGRESS NOTES
HPI:    Patient ID: Dc Iverson is a 39year old female. Patient presents with hair loss for the past 2 months Has been using a scalp massager. She has noted it's mostly in the front part of her scalp. No draining. No tenderness noted. No itching.  P noted on the lateral side of the right eye. About 1-2 mm in size. Neurological:      Mental Status: She is alert and oriented to person, place, and time. ASSESSMENT/PLAN:   1.  Hair loss  -After discussion with patient, advised the followi

## 2021-11-16 RX ORDER — ALPRAZOLAM 0.25 MG/1
0.25 TABLET ORAL NIGHTLY PRN
Qty: 30 TABLET | Refills: 0 | Status: SHIPPED | OUTPATIENT
Start: 2021-11-16 | End: 2022-01-11

## 2021-11-16 NOTE — TELEPHONE ENCOUNTER
Please review. Protocol failed or has no protocol.   Requested Prescriptions   Pending Prescriptions Disp Refills    ALPRAZOLAM 0.25 MG Oral Tab [Pharmacy Med Name: ALPRAZOLAM 0.25MG TABLETS] 30 tablet 0     Sig: TAKE 1 TABLET BY MOUTH EVERY NIGHT AS NEEDED

## 2021-12-12 ENCOUNTER — PATIENT MESSAGE (OUTPATIENT)
Dept: INTERNAL MEDICINE CLINIC | Facility: CLINIC | Age: 41
End: 2021-12-12

## 2021-12-12 ENCOUNTER — NURSE TRIAGE (OUTPATIENT)
Dept: INTERNAL MEDICINE CLINIC | Facility: CLINIC | Age: 41
End: 2021-12-12

## 2021-12-12 NOTE — TELEPHONE ENCOUNTER
From: Ashely Regan  To: Therese Moise MD  Sent: 12/12/2021 3:31 PM CST  Subject: UTI    Dear Dr Chaya Khan,  i’m pretty sure i have a UTI, symptoms started Friday afternoon.  i have my booster vaccine scheduled for next weekend and i don’t wanna risk not

## 2021-12-12 NOTE — TELEPHONE ENCOUNTER
----- Message from [address] [place] [address]. Lisa sent at 12/12/2021  3:31 PM CST -----  Regarding: UTI  Dear Dr Amina Cox,  i’m pretty sure i have a UTI, symptoms started Friday afternoon.   i have my booster vaccine scheduled for next weekend and i don’t wanna risk not b

## 2021-12-13 ENCOUNTER — PATIENT MESSAGE (OUTPATIENT)
Dept: INTERNAL MEDICINE CLINIC | Facility: CLINIC | Age: 41
End: 2021-12-13

## 2021-12-13 RX ORDER — NITROFURANTOIN 25; 75 MG/1; MG/1
100 CAPSULE ORAL 2 TIMES DAILY
Qty: 10 CAPSULE | Refills: 0 | Status: SHIPPED | OUTPATIENT
Start: 2021-12-13 | End: 2021-12-18

## 2021-12-13 NOTE — TELEPHONE ENCOUNTER
Dr. Rachel Lake please see pt mychart message below. Pt was informed she needs a office visit. Pt cursed and said it was ridiculous she had to make a office visit. I then informed her she can make a video visit if she would prefer. Per pt \" this is bull. Sherwin Da Silva \"

## 2021-12-14 ENCOUNTER — NURSE TRIAGE (OUTPATIENT)
Dept: INTERNAL MEDICINE CLINIC | Facility: CLINIC | Age: 41
End: 2021-12-14

## 2021-12-14 NOTE — TELEPHONE ENCOUNTER
Please see 12/12 encounter painful urination. Marc Salazar spoke with pt. He gave her macrobid. Per pharmacy she picked it up yesterday. No further action is needed. no breast tenderness R/no breast tenderness L

## 2021-12-14 NOTE — TELEPHONE ENCOUNTER
I'm not waiting until January 6th for a video appointment, and I'd rather not come in contact with several people during a pandemic because of a simple UTI.   Please send a prescription so that this doesn't develop into a kidney infection and I can get my b

## 2021-12-19 ENCOUNTER — IMMUNIZATION (OUTPATIENT)
Dept: LAB | Facility: HOSPITAL | Age: 41
End: 2021-12-19
Attending: EMERGENCY MEDICINE
Payer: COMMERCIAL

## 2021-12-19 DIAGNOSIS — Z23 NEED FOR VACCINATION: Primary | ICD-10-CM

## 2021-12-19 PROCEDURE — 0004A SARSCOV2 VAC 30MCG/0.3ML IM: CPT

## 2022-01-11 RX ORDER — DULOXETIN HYDROCHLORIDE 30 MG/1
30 CAPSULE, DELAYED RELEASE ORAL DAILY
Qty: 90 CAPSULE | Refills: 1 | Status: SHIPPED | OUTPATIENT
Start: 2022-01-11 | End: 2022-01-11

## 2022-01-11 RX ORDER — DULOXETIN HYDROCHLORIDE 30 MG/1
30 CAPSULE, DELAYED RELEASE ORAL DAILY
Qty: 90 CAPSULE | Refills: 1 | Status: SHIPPED | OUTPATIENT
Start: 2022-01-11

## 2022-01-11 NOTE — TELEPHONE ENCOUNTER
Refill passed per [place] [employer] [facility] protocol.     Requested Prescriptions   Pending Prescriptions Disp Refills    DULOXETINE 30 MG Oral Cap DR Particles [Pharmacy Med Name: DULOXETINE DR 30MG CAPSULES] 90 capsule 0     Sig: TAKE 1 CAPSULE(30 MG) BY MOUTH DAILY        Psychiatric Non-Scheduled (Anti-Anxiety) Failed - 1/11/2022 12:24 PM        Failed - Appointment in last 6 or next 3 months              Recent Outpatient Visits              2 months ago Hair loss    [facility] Luis Enrique Marcial PA-C    Office Visit    2 months ago Current mild episode of major depressive disorder without prior episode [facility])    [place] [employer] [facility], [facility] [place], Jennet Landau, MD    Telemedicine    10 months ago [employer] [place], Jennet Landau, MD    Telemedicine    1 year ago Cellulitis of upper extremity, unspecified laterality    [facility], [place], Jennet Landau, MD    Telemedicine    1 year ago Suspected COVID-19 virus infection    [facility] [place], Jennet Landau, MD    Telemedicine            Future Appointments         Provider Department Appt Notes    In 4 days [id number] mammogram

## 2022-01-11 NOTE — TELEPHONE ENCOUNTER
Please review. Protocol failed / No protocol. Requested Prescriptions   Pending Prescriptions Disp Refills    ALPRAZolam 0.25 MG Oral Tab 30 tablet 0     Sig: Take 1 tablet (0.25 mg total) by mouth nightly as needed for Sleep or Anxiety.         There is

## 2022-01-11 NOTE — TELEPHONE ENCOUNTER
Refill passed per [facility] protocol. Patient last seen 11/5/21.    Requested Prescriptions   Pending Prescriptions Disp Refills    ALPRAZolam 0.25 MG Oral Tab 30 tablet 0     Sig: Take 1 tablet (0.25 mg total) by mouth nightly as needed for Sleep or

## 2022-01-12 RX ORDER — ALPRAZOLAM 0.25 MG/1
0.25 TABLET ORAL NIGHTLY PRN
Qty: 30 TABLET | Refills: 0 | Status: SHIPPED | OUTPATIENT
Start: 2022-01-12

## 2022-01-15 ENCOUNTER — HOSPITAL ENCOUNTER (OUTPATIENT)
Dept: MAMMOGRAPHY | Age: 42
Discharge: HOME | End: 2022-01-15
Attending: INTERNAL MEDICINE
Payer: COMMERCIAL

## 2022-01-15 DIAGNOSIS — Z12.31 BREAST CANCER SCREENING BY MAMMOGRAM: ICD-10-CM

## 2022-01-15 PROCEDURE — 77067 SCR MAMMO BI INCL CAD: CPT | Performed by: INTERNAL MEDICINE

## 2022-01-15 PROCEDURE — 77063 BREAST TOMOSYNTHESIS BI: CPT | Performed by: INTERNAL MEDICINE

## 2022-02-10 ENCOUNTER — IMMUNIZATION (OUTPATIENT)
Dept: INTERNAL MEDICINE CLINIC | Facility: CLINIC | Age: 42
End: 2022-02-10
Payer: COMMERCIAL

## 2022-02-10 DIAGNOSIS — Z23 NEED FOR VACCINATION: Primary | ICD-10-CM

## 2022-02-10 PROCEDURE — 90471 IMMUNIZATION ADMIN: CPT | Performed by: NURSE PRACTITIONER

## 2022-02-10 PROCEDURE — 90686 IIV4 VACC NO PRSV 0.5 ML IM: CPT | Performed by: NURSE PRACTITIONER

## 2022-03-29 ENCOUNTER — PATIENT MESSAGE (OUTPATIENT)
Dept: INTERNAL MEDICINE CLINIC | Facility: CLINIC | Age: 42
End: 2022-03-29

## 2022-03-30 NOTE — TELEPHONE ENCOUNTER
Spoke with pt and advised she call the genetic testing facility and ask if they take her insurance or she can also call her insurance to check with coverage. Pt will call back if testing is covered so she can obtain a referral.    Advised pt call psychiatry to schedule and per pt \"I have bad luck in the past with psychiatrists\"  Pt is trying to find a therapist, but is having a hard time getting in anywhere soon. Advised pt to call Lynda Walker to see if any soon available for both psychiatry and therapy. Pt agrees to plan. Lynda Walker number provided.

## 2022-03-30 NOTE — TELEPHONE ENCOUNTER
Pt saw Dr. Volodymyr Flores on 10/18/21    1. Current mild episode of major depressive disorder without prior episode [facility])  She was not taking her medications previously. She does not want to take Zoloft or Lexapro or fluoxetine. I have given her Cymbalta. She is aware that this will take few weeks for the medicine to work.   I have given her referral for [facility] and also ensure to make appointment to psychiatrist.  Jacqueline Anderson patient to call [phone] or go to the emergency room if she develops any bad thoughts.  - [address]

## 2022-03-30 NOTE — TELEPHONE ENCOUNTER
From: Dotty Heaton  To: Iam Andrews MD  Sent: 3/29/2022 11:21 AM CDT  Subject: Mental Health Meds    I feel like Cymbalta isn't doing exactly what I'd like it to be doing? I feel more angry and I'm still super depressed. I'm not quite as fatigued, but I'm taking Vitamin D, B12 and Iron so I think they're helping. Weekends are spent forcing myself out of bed (multiple times a day). I heard about a gene testing program that might provide insight on what med would work better for me, called [employer]. Is that something I could get a referral for?  I think out of pocket cost tends to be over $300 without a referral. Thank you, Miguelito Delarosa

## 2022-03-30 NOTE — TELEPHONE ENCOUNTER
Please get more information regarding genesight. Typically HMO does not need a referral for mental illness. If she needs a referral, I will be happy to provide it. I am not sure about coverage.   Please inform her thank you    If she needs a referral, please pend it

## 2022-04-19 ENCOUNTER — TELEPHONE (OUTPATIENT)
Dept: INTERNAL MEDICINE CLINIC | Facility: CLINIC | Age: 42
End: 2022-04-19

## 2022-05-11 ENCOUNTER — TELEPHONE (OUTPATIENT)
Dept: DERMATOLOGY CLINIC | Facility: CLINIC | Age: 42
End: 2022-05-11

## 2022-05-11 NOTE — TELEPHONE ENCOUNTER
LOV 11/5/21. NK.  1. Hair loss  -After discussion with patient, advised the following:  -Told to use the clobetasol solution  -Educated pt on how to use the solution.   -Reviewed labs  -Vitamin D is slightly low and iron is slightly low  -Patient is taking supplements for this.   -To call or follow-up with worsening symptoms or concerns.   -Pt was agreeable to plan and will comply with discussion above. Contacted patient. She has stopped using clobetasol r/t scalp tenderness and small pimple like bumps to her scalp. Pt is still taking Vit D and iron. Still having hair thinning to the front of her scalp, little improvement from LOV with NK. Patient would like to know if the electric stimulating wand for hair regrowth would be beneficial for her? Do you recommend any additional medications for her hair loss, patient is willing to try anything. Will come in for appointment if necessary.

## 2022-05-11 NOTE — TELEPHONE ENCOUNTER
Patient called    States steroid medication prescribed to apply to scalp for hair loss is not working well. Asking if there is another medication that can be prescribed?  Please call    LOV 11/5/21

## 2022-05-13 PROBLEM — F10.20 ALCOHOL USE DISORDER, MODERATE, DEPENDENCE (HCC): Status: ACTIVE | Noted: 2022-05-13

## 2022-05-13 PROBLEM — F12.20 CANNABIS DEPENDENCE, DAILY USE (HCC): Status: ACTIVE | Noted: 2022-05-13

## 2022-05-13 NOTE — TELEPHONE ENCOUNTER
Please have her schedule appt. Collagen powder with more data to support loss than electrical stimulation.  This is not an emergent appt

## 2022-05-18 ENCOUNTER — PATIENT MESSAGE (OUTPATIENT)
Dept: OBGYN CLINIC | Facility: CLINIC | Age: 42
End: 2022-05-18

## 2022-05-19 NOTE — TELEPHONE ENCOUNTER
Message to EMB to please review string of my chart messages. OK for pt to be seen this week for exam, or wait until next week when off period?

## 2022-05-20 ENCOUNTER — OFFICE VISIT (OUTPATIENT)
Dept: OBGYN CLINIC | Facility: CLINIC | Age: 42
End: 2022-05-20
Payer: COMMERCIAL

## 2022-05-20 ENCOUNTER — LAB ENCOUNTER (OUTPATIENT)
Dept: LAB | Facility: HOSPITAL | Age: 42
End: 2022-05-20
Attending: NURSE PRACTITIONER
Payer: COMMERCIAL

## 2022-05-20 VITALS
BODY MASS INDEX: 36 KG/M2 | SYSTOLIC BLOOD PRESSURE: 142 MMHG | HEART RATE: 82 BPM | DIASTOLIC BLOOD PRESSURE: 83 MMHG | WEIGHT: 225.38 LBS

## 2022-05-20 DIAGNOSIS — A63.0 GENITAL WARTS: ICD-10-CM

## 2022-05-20 DIAGNOSIS — Z11.3 ROUTINE SCREENING FOR STI (SEXUALLY TRANSMITTED INFECTION): ICD-10-CM

## 2022-05-20 DIAGNOSIS — N94.9 GENITAL LESION, FEMALE: Primary | ICD-10-CM

## 2022-05-20 LAB
HBV SURFACE AG SER-ACNC: <0.1 [IU]/L
HBV SURFACE AG SERPL QL IA: NONREACTIVE
HCV AB SERPL QL IA: NONREACTIVE

## 2022-05-20 PROCEDURE — 86780 TREPONEMA PALLIDUM: CPT

## 2022-05-20 PROCEDURE — 3077F SYST BP >= 140 MM HG: CPT | Performed by: NURSE PRACTITIONER

## 2022-05-20 PROCEDURE — 87389 HIV-1 AG W/HIV-1&-2 AB AG IA: CPT

## 2022-05-20 PROCEDURE — 86803 HEPATITIS C AB TEST: CPT

## 2022-05-20 PROCEDURE — 3079F DIAST BP 80-89 MM HG: CPT | Performed by: NURSE PRACTITIONER

## 2022-05-20 PROCEDURE — 87340 HEPATITIS B SURFACE AG IA: CPT

## 2022-05-20 PROCEDURE — 99214 OFFICE O/P EST MOD 30 MIN: CPT | Performed by: NURSE PRACTITIONER

## 2022-05-20 PROCEDURE — 36415 COLL VENOUS BLD VENIPUNCTURE: CPT

## 2022-05-20 RX ORDER — VALACYCLOVIR HYDROCHLORIDE 1 G/1
1000 TABLET, FILM COATED ORAL EVERY 12 HOURS SCHEDULED
Qty: 20 TABLET | Refills: 0 | Status: SHIPPED | OUTPATIENT
Start: 2022-05-20 | End: 2022-05-30

## 2022-05-23 ENCOUNTER — TELEPHONE (OUTPATIENT)
Dept: OBGYN CLINIC | Facility: CLINIC | Age: 42
End: 2022-05-23

## 2022-05-23 LAB
C TRACH DNA SPEC QL NAA+PROBE: NEGATIVE
HSV1 DNA SPEC QL NAA+PROBE: NEGATIVE
HSV2 DNA SPEC QL NAA+PROBE: NEGATIVE
N GONORRHOEA DNA SPEC QL NAA+PROBE: NEGATIVE
T PALLIDUM AB SER QL: NEGATIVE
T VAGINALIS RRNA SPEC QL NAA+PROBE: NEGATIVE

## 2022-05-23 NOTE — TELEPHONE ENCOUNTER
Informed pt that her Hep C, Hep B and HIV were Non Reactive. Informed pt that the other labs are still pending.

## 2022-05-24 ENCOUNTER — PATIENT MESSAGE (OUTPATIENT)
Dept: OBGYN CLINIC | Facility: CLINIC | Age: 42
End: 2022-05-24

## 2022-05-24 NOTE — TELEPHONE ENCOUNTER
From: Chen Worthy  To: Lady Gill APRMOHAN  Sent: 5/24/2022 2:48 PM CDT  Subject: STD test and HPV Vaccine    Dear MsJenny Pedro Mercado:    I just wanted to confirm that I'll be getting the HPV Vaccine at our appointment on Friday 5/27/22. I saw the test results and everything came back negative, but I think you said a false negative could happen. My partner was also tested and received a positive for HSV1. Do you think Friday's appointment be painful? Should I take ibuprofen or something beforehand?     Thank you,  Reny Young

## 2022-05-25 NOTE — TELEPHONE ENCOUNTER
Ibuprofen an hour before is fine. No other prep. Yes we can give her the HPV vaccine at next visit too    Culture was negative and yes we discussed culture may be false negative. Recommend continuing valtrex as prescribed. Can further discuss at visit on Friday.     YAAKOV Turner

## 2022-05-27 ENCOUNTER — OFFICE VISIT (OUTPATIENT)
Dept: OBGYN CLINIC | Facility: CLINIC | Age: 42
End: 2022-05-27
Payer: COMMERCIAL

## 2022-05-27 ENCOUNTER — PATIENT MESSAGE (OUTPATIENT)
Dept: OBGYN CLINIC | Facility: CLINIC | Age: 42
End: 2022-05-27

## 2022-05-27 VITALS
WEIGHT: 227.81 LBS | DIASTOLIC BLOOD PRESSURE: 87 MMHG | BODY MASS INDEX: 37 KG/M2 | HEART RATE: 73 BPM | SYSTOLIC BLOOD PRESSURE: 143 MMHG

## 2022-05-27 DIAGNOSIS — Z23 NEED FOR HPV VACCINE: Primary | ICD-10-CM

## 2022-05-27 DIAGNOSIS — A63.0 GENITAL WARTS: Primary | ICD-10-CM

## 2022-05-27 DIAGNOSIS — B00.9 HSV INFECTION: ICD-10-CM

## 2022-05-27 PROCEDURE — 57061 DESTRUCTION VAG LESIONS SMPL: CPT | Performed by: NURSE PRACTITIONER

## 2022-05-27 PROCEDURE — 3079F DIAST BP 80-89 MM HG: CPT | Performed by: NURSE PRACTITIONER

## 2022-05-27 PROCEDURE — 99213 OFFICE O/P EST LOW 20 MIN: CPT | Performed by: NURSE PRACTITIONER

## 2022-05-27 PROCEDURE — 3077F SYST BP >= 140 MM HG: CPT | Performed by: NURSE PRACTITIONER

## 2022-05-27 NOTE — TELEPHONE ENCOUNTER
From: Vamsi Bloom  To: YAAKOV Garrett  Sent: 5/27/2022 5:07 PM CDT  Subject: vaccine    talked to Leah Pryor and they said that whichever doctor (or hc professional) who recommended the vaccine would just need to put in a referral for the vaccine and then it will be covered, since my insurance is an hmo. so, please put in a referral for the vaccine. i think i see you again 6/7/22. thanks!

## 2022-05-31 NOTE — TELEPHONE ENCOUNTER
Conversation via webex with Emily Cheng From [address] [place] [address]. We do not obtain authorization for vaccine, all that is needed is an order to be placed.      Will route to RN staff to please place order

## 2022-06-07 ENCOUNTER — OFFICE VISIT (OUTPATIENT)
Dept: OBGYN CLINIC | Facility: CLINIC | Age: 42
End: 2022-06-07
Payer: COMMERCIAL

## 2022-06-07 VITALS
SYSTOLIC BLOOD PRESSURE: 148 MMHG | HEART RATE: 65 BPM | BODY MASS INDEX: 37 KG/M2 | WEIGHT: 227 LBS | DIASTOLIC BLOOD PRESSURE: 89 MMHG

## 2022-06-07 DIAGNOSIS — Z23 NEED FOR HPV VACCINATION: ICD-10-CM

## 2022-06-07 DIAGNOSIS — A63.0 GENITAL WARTS: Primary | ICD-10-CM

## 2022-06-07 PROCEDURE — 57061 DESTRUCTION VAG LESIONS SMPL: CPT | Performed by: NURSE PRACTITIONER

## 2022-06-07 PROCEDURE — 3077F SYST BP >= 140 MM HG: CPT | Performed by: NURSE PRACTITIONER

## 2022-06-07 PROCEDURE — 90651 9VHPV VACCINE 2/3 DOSE IM: CPT | Performed by: NURSE PRACTITIONER

## 2022-06-07 PROCEDURE — 90471 IMMUNIZATION ADMIN: CPT | Performed by: NURSE PRACTITIONER

## 2022-06-07 PROCEDURE — 3079F DIAST BP 80-89 MM HG: CPT | Performed by: NURSE PRACTITIONER

## 2022-06-08 NOTE — PROGRESS NOTES
First Gardasil given today in Left Deltoid after consent was obtained. Tolerated well after 15 min. Pt was told to return in Aug. (2 months) for next Gardasil.

## 2022-06-14 PROBLEM — E66.9 CLASS 2 OBESITY: Status: ACTIVE | Noted: 2022-06-14

## 2022-06-14 PROBLEM — D51.0 PERNICIOUS ANEMIA: Status: ACTIVE | Noted: 2022-06-14

## 2022-06-14 PROBLEM — E66.812 CLASS 2 OBESITY: Status: ACTIVE | Noted: 2022-06-14

## 2022-06-23 ENCOUNTER — LAB ENCOUNTER (OUTPATIENT)
Dept: LAB | Facility: HOSPITAL | Age: 42
End: 2022-06-23
Attending: NURSE PRACTITIONER
Payer: COMMERCIAL

## 2022-06-23 ENCOUNTER — OFFICE VISIT (OUTPATIENT)
Dept: OBGYN CLINIC | Facility: CLINIC | Age: 42
End: 2022-06-23
Payer: COMMERCIAL

## 2022-06-23 VITALS
SYSTOLIC BLOOD PRESSURE: 126 MMHG | DIASTOLIC BLOOD PRESSURE: 85 MMHG | WEIGHT: 223.81 LBS | BODY MASS INDEX: 36 KG/M2 | HEART RATE: 73 BPM

## 2022-06-23 DIAGNOSIS — Z20.828 EXPOSURE TO HERPES SIMPLEX VIRUS (HSV): ICD-10-CM

## 2022-06-23 DIAGNOSIS — A63.0 GENITAL WARTS: Primary | ICD-10-CM

## 2022-06-23 PROCEDURE — 36415 COLL VENOUS BLD VENIPUNCTURE: CPT

## 2022-06-23 PROCEDURE — 86695 HERPES SIMPLEX TYPE 1 TEST: CPT

## 2022-06-23 PROCEDURE — 3074F SYST BP LT 130 MM HG: CPT | Performed by: NURSE PRACTITIONER

## 2022-06-23 PROCEDURE — 99213 OFFICE O/P EST LOW 20 MIN: CPT | Performed by: NURSE PRACTITIONER

## 2022-06-23 PROCEDURE — 86696 HERPES SIMPLEX TYPE 2 TEST: CPT

## 2022-06-23 PROCEDURE — 3079F DIAST BP 80-89 MM HG: CPT | Performed by: NURSE PRACTITIONER

## 2022-06-24 LAB
HSV 1 GLYCOPROTEIN G, IGG: POSITIVE
HSV 2 GLYCOPROTEIN G, IGG: NEGATIVE

## 2022-06-24 NOTE — PROGRESS NOTES
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 This is typically related to cold sores, however, you can have a type 1 outbreak of HSV in the genital area. This could be what caused your symptoms previously.     Take care  YAAKOV Bellamy

## 2022-07-13 ENCOUNTER — PATIENT MESSAGE (OUTPATIENT)
Dept: INTERNAL MEDICINE CLINIC | Facility: CLINIC | Age: 42
End: 2022-07-13

## 2022-07-13 DIAGNOSIS — L65.9 HAIR LOSS: Primary | ICD-10-CM

## 2022-07-14 ENCOUNTER — TELEPHONE (OUTPATIENT)
Dept: OBGYN CLINIC | Facility: CLINIC | Age: 42
End: 2022-07-14

## 2022-07-14 ENCOUNTER — OFFICE VISIT (OUTPATIENT)
Dept: OBGYN CLINIC | Facility: CLINIC | Age: 42
End: 2022-07-14
Payer: COMMERCIAL

## 2022-07-14 VITALS
WEIGHT: 227.19 LBS | DIASTOLIC BLOOD PRESSURE: 87 MMHG | SYSTOLIC BLOOD PRESSURE: 146 MMHG | HEART RATE: 63 BPM | BODY MASS INDEX: 37 KG/M2

## 2022-07-14 DIAGNOSIS — A63.0 GENITAL WARTS: Primary | ICD-10-CM

## 2022-07-14 PROCEDURE — 3079F DIAST BP 80-89 MM HG: CPT | Performed by: NURSE PRACTITIONER

## 2022-07-14 PROCEDURE — 57061 DESTRUCTION VAG LESIONS SMPL: CPT | Performed by: NURSE PRACTITIONER

## 2022-07-14 PROCEDURE — 3077F SYST BP >= 140 MM HG: CPT | Performed by: NURSE PRACTITIONER

## 2022-07-14 NOTE — TELEPHONE ENCOUNTER
Joe Gomez RN 7/13/2022 7:07 PM CDT        ----- Message -----  From: Jose Enrique Arias  Sent: 7/13/2022 2:39 PM CDT  To: Em Rn Triage  Subject: dermatologist referral     Dear Dr. Lucero End:    About a year ago, you referred me to a dermatologist for hair loss. I'm visiting their office again on 7/20/22 but they need a new referral. Could you please put that in for me? Thank you!

## 2022-07-14 NOTE — TELEPHONE ENCOUNTER
Pt has an appointment today and states she is on her menstrual cycle and wondering if she should keep appointment.  Please advise

## 2022-07-14 NOTE — TELEPHONE ENCOUNTER
Referral pend for Dr. Gonsalez Dewey loss message sent to patient for clarification upper and lower denture

## 2022-07-14 NOTE — TELEPHONE ENCOUNTER
Patient coming in for genital wart follow-up. Lesions are external. Advised okay to keep appt. Patient verbalized understanding.

## 2022-07-16 ENCOUNTER — TELEPHONE (OUTPATIENT)
Dept: INTERNAL MEDICINE CLINIC | Facility: CLINIC | Age: 42
End: 2022-07-16

## 2022-07-16 ENCOUNTER — LAB ENCOUNTER (OUTPATIENT)
Dept: LAB | Facility: HOSPITAL | Age: 42
End: 2022-07-16
Attending: INTERNAL MEDICINE
Payer: COMMERCIAL

## 2022-07-16 DIAGNOSIS — Z20.828 EXPOSURE TO SARS-ASSOCIATED CORONAVIRUS: ICD-10-CM

## 2022-07-16 DIAGNOSIS — Z20.828 EXPOSURE TO SARS-ASSOCIATED CORONAVIRUS: Primary | ICD-10-CM

## 2022-07-16 NOTE — TELEPHONE ENCOUNTER
Patient calling ( identified name and  ) states he cold symptoms , cough sore throat, swollen lymph nodes,  body aches, chills, nasal congestion, onset since Friday     Requesting a covid test.order placed     Provided information to call Central scheduling at [phone] to make appointment    Advised to call back with any questions/ concerns     Patient verbalizes understanding and agrees.

## 2022-07-17 LAB — SARS-COV-2 RNA RESP QL NAA+PROBE: DETECTED

## 2022-07-19 ENCOUNTER — PATIENT MESSAGE (OUTPATIENT)
Dept: OTHER | Age: 42
End: 2022-07-19

## 2022-07-19 ENCOUNTER — TELEPHONE (OUTPATIENT)
Dept: INTERNAL MEDICINE CLINIC | Facility: CLINIC | Age: 42
End: 2022-07-19

## 2022-07-19 NOTE — TELEPHONE ENCOUNTER
----- Message from Oscar Shepherd RN sent at 7/19/2022  8:03 AM CDT -----  Hi Dr. Sarah Arias,    Please make this message into a telephone encounter so we may address the issue. Thank you so much,                       Madhavi HENDERSON  ----- Message -----  From: Luis Eastman MD  Sent: 7/18/2022   7:24 PM CDT  To: Em Rn Triage    Actually the interaction of Paxlovid with trazodone is too strong so she can not  be taking those 2 medications together.   If patient feeling sick very strong symptoms of chest pain or shortness of breath she should go to ER and maybe would qualify for monoclonal antibody infusion

## 2022-08-03 ENCOUNTER — NURSE TRIAGE (OUTPATIENT)
Dept: INTERNAL MEDICINE CLINIC | Facility: CLINIC | Age: 42
End: 2022-08-03

## 2022-08-03 ENCOUNTER — PATIENT MESSAGE (OUTPATIENT)
Dept: INTERNAL MEDICINE CLINIC | Facility: CLINIC | Age: 42
End: 2022-08-03

## 2022-08-05 ENCOUNTER — OFFICE VISIT (OUTPATIENT)
Dept: INTERNAL MEDICINE CLINIC | Facility: CLINIC | Age: 42
End: 2022-08-05
Payer: COMMERCIAL

## 2022-08-05 VITALS
BODY MASS INDEX: 35.6 KG/M2 | HEIGHT: 67 IN | DIASTOLIC BLOOD PRESSURE: 78 MMHG | HEART RATE: 76 BPM | SYSTOLIC BLOOD PRESSURE: 110 MMHG | OXYGEN SATURATION: 99 % | WEIGHT: 226.81 LBS

## 2022-08-05 DIAGNOSIS — U09.9 COVID-19 LONG HAULER MANIFESTING CHRONIC COUGH: ICD-10-CM

## 2022-08-05 DIAGNOSIS — R05.3 COVID-19 LONG HAULER MANIFESTING CHRONIC COUGH: ICD-10-CM

## 2022-08-05 DIAGNOSIS — M25.562 ACUTE PAIN OF LEFT KNEE: Primary | ICD-10-CM

## 2022-08-05 PROCEDURE — 3008F BODY MASS INDEX DOCD: CPT | Performed by: NURSE PRACTITIONER

## 2022-08-05 PROCEDURE — 3078F DIAST BP <80 MM HG: CPT | Performed by: NURSE PRACTITIONER

## 2022-08-05 PROCEDURE — 3074F SYST BP LT 130 MM HG: CPT | Performed by: NURSE PRACTITIONER

## 2022-08-05 PROCEDURE — 99213 OFFICE O/P EST LOW 20 MIN: CPT | Performed by: NURSE PRACTITIONER

## 2022-08-05 NOTE — PATIENT INSTRUCTIONS
ASSESSMENT/PLAN:   Acute pain of left knee  (primary encounter diagnosis)  Apply voltaren cream 2 times daily as needed  Wear compress sleeve during day  Follow up with orthopedics referral entered    Covid-19 long hauler manifesting chronic cough  Ordered xray  Cough can linger several weeks after covid19  Contact office if symptoms worsen or do not improve    No orders of the defined types were placed in this encounter.       Meds This Visit:  Requested Prescriptions      No prescriptions requested or ordered in this encounter       Imaging & Referrals:  ORTHOPEDIC - INTERNAL  XR CHEST PA + LAT CHEST (CPT=71046)

## 2022-08-05 NOTE — TELEPHONE ENCOUNTER
Future Appointments   Date Time Provider Naila Vasquez   8/5/2022 12:30 PM YAAKOV Iraheta [id number] [address]   Noted thanks

## 2022-08-06 ENCOUNTER — HOSPITAL ENCOUNTER (OUTPATIENT)
Dept: GENERAL RADIOLOGY | Age: 42
Discharge: HOME | End: 2022-08-06
Attending: NURSE PRACTITIONER
Payer: COMMERCIAL

## 2022-08-06 DIAGNOSIS — U09.9 COVID-19 LONG HAULER MANIFESTING CHRONIC COUGH: ICD-10-CM

## 2022-08-06 DIAGNOSIS — R05.3 COVID-19 LONG HAULER MANIFESTING CHRONIC COUGH: ICD-10-CM

## 2022-08-06 PROCEDURE — 71046 X-RAY EXAM CHEST 2 VIEWS: CPT | Performed by: NURSE PRACTITIONER

## 2022-08-15 ENCOUNTER — OFFICE VISIT (OUTPATIENT)
Dept: OBGYN CLINIC | Facility: CLINIC | Age: 42
End: 2022-08-15
Payer: COMMERCIAL

## 2022-08-15 VITALS
HEART RATE: 80 BPM | WEIGHT: 229.19 LBS | BODY MASS INDEX: 36 KG/M2 | DIASTOLIC BLOOD PRESSURE: 82 MMHG | SYSTOLIC BLOOD PRESSURE: 133 MMHG

## 2022-08-15 DIAGNOSIS — A63.0 GENITAL WARTS: Primary | ICD-10-CM

## 2022-08-15 DIAGNOSIS — Z23 NEED FOR HPV VACCINATION: ICD-10-CM

## 2022-08-15 PROCEDURE — 3075F SYST BP GE 130 - 139MM HG: CPT | Performed by: NURSE PRACTITIONER

## 2022-08-15 PROCEDURE — 99213 OFFICE O/P EST LOW 20 MIN: CPT | Performed by: NURSE PRACTITIONER

## 2022-08-15 PROCEDURE — 90471 IMMUNIZATION ADMIN: CPT | Performed by: NURSE PRACTITIONER

## 2022-08-15 PROCEDURE — 57061 DESTRUCTION VAG LESIONS SMPL: CPT | Performed by: NURSE PRACTITIONER

## 2022-08-15 PROCEDURE — 3079F DIAST BP 80-89 MM HG: CPT | Performed by: NURSE PRACTITIONER

## 2022-08-15 PROCEDURE — 90651 9VHPV VACCINE 2/3 DOSE IM: CPT | Performed by: NURSE PRACTITIONER

## 2022-08-15 NOTE — PROGRESS NOTES
Consent obtained. HPV admin to L delt without complication. VIS provided to pt. Directed to return for dose 3 in December. Patient verbalized understanding.

## 2022-08-15 NOTE — PROCEDURES
Patient for TCA application #5    Informed consent obtained    Petroleum jelly applied to surrounding normal skin. Small amount of TCA applied to condyloma using cotton applicator until white blanching of lesion noted and solution had dried. Patient tolerated well. Discussed post-treatment precautions and reasons to call office prior to next appointment. Rtc 1-2 weeks for next treatment.      YAAKOV Mccann

## 2022-08-19 ENCOUNTER — OFFICE VISIT (OUTPATIENT)
Dept: INTERNAL MEDICINE CLINIC | Facility: CLINIC | Age: 42
End: 2022-08-19
Payer: COMMERCIAL

## 2022-08-19 ENCOUNTER — TELEPHONE (OUTPATIENT)
Dept: INTERNAL MEDICINE CLINIC | Facility: CLINIC | Age: 42
End: 2022-08-19

## 2022-08-19 VITALS
DIASTOLIC BLOOD PRESSURE: 81 MMHG | HEART RATE: 67 BPM | SYSTOLIC BLOOD PRESSURE: 126 MMHG | BODY MASS INDEX: 35.94 KG/M2 | WEIGHT: 229 LBS | HEIGHT: 67 IN

## 2022-08-19 DIAGNOSIS — T14.8XXA HEMATOMA OF SKIN: Primary | ICD-10-CM

## 2022-08-19 DIAGNOSIS — F33.40 RECURRENT MAJOR DEPRESSIVE DISORDER, IN REMISSION (HCC): ICD-10-CM

## 2022-08-19 PROCEDURE — 3079F DIAST BP 80-89 MM HG: CPT | Performed by: INTERNAL MEDICINE

## 2022-08-19 PROCEDURE — 99213 OFFICE O/P EST LOW 20 MIN: CPT | Performed by: INTERNAL MEDICINE

## 2022-08-19 PROCEDURE — 3008F BODY MASS INDEX DOCD: CPT | Performed by: INTERNAL MEDICINE

## 2022-08-19 PROCEDURE — 3074F SYST BP LT 130 MM HG: CPT | Performed by: INTERNAL MEDICINE

## 2022-08-19 NOTE — TELEPHONE ENCOUNTER
Patient states over one week ago, got up in the morning fell back over her dog into the wall, bumping her head. States she still has a \"goose egg,\" on back of head and wanting to be seen. Denies dizziness, headache, nausea, vomiting.     Scheduled today with Dr. Michelle Powell at 11:40 am.

## 2022-09-06 ENCOUNTER — LAB ENCOUNTER (OUTPATIENT)
Dept: LAB | Age: 42
End: 2022-09-06
Attending: INTERNAL MEDICINE
Payer: COMMERCIAL

## 2022-09-06 DIAGNOSIS — J02.9 SORE THROAT: ICD-10-CM

## 2022-09-06 LAB — SARS-COV-2 RNA RESP QL NAA+PROBE: NOT DETECTED

## 2022-09-14 ENCOUNTER — TELEPHONE (OUTPATIENT)
Dept: SURGERY | Facility: CLINIC | Age: 42
End: 2022-09-14

## 2022-09-26 ENCOUNTER — PATIENT MESSAGE (OUTPATIENT)
Dept: INTERNAL MEDICINE CLINIC | Facility: CLINIC | Age: 42
End: 2022-09-26

## 2022-09-26 NOTE — TELEPHONE ENCOUNTER
From: Yared Willingham  To: Emil Carvajal MD  Sent: 9/26/2022 10:53 AM CDT  Subject: Mammogram    I received a letter from your office that the following test had not been done:  KOJO JOLANAT 2D+3D SCREENING BILAT (CPT=77067/97683)    The above gibberish means nothing to a layperson, however, Google shows that it is related to mammograms. I had a mammogram earlier this year the results letter was to have been sent to you on January 17th.     Sincerely,  Alanna Marshall

## 2022-09-28 ENCOUNTER — PATIENT MESSAGE (OUTPATIENT)
Dept: INTERNAL MEDICINE CLINIC | Facility: CLINIC | Age: 42
End: 2022-09-28

## 2022-09-28 DIAGNOSIS — L65.9 HAIR LOSS: Primary | ICD-10-CM

## 2022-09-28 NOTE — TELEPHONE ENCOUNTER
From: Crissy Graham  To: Prema Rosales MD  Sent: 2022 1:05 PM CDT  Subject: Dermatology Referral Needed    Dear Dr. Ugo Chavarria:    I am again seeing a dermatologist for hair loss (the previous medication had a painful side effect) and I need another referral as the previous one has . Here are the details of the appointment:    Exam - Established with Chau Gaitan PA-C  Starts at 9:30 AM CDT(15 minutes)  [facility]  [address] [place] [id number] [phone]    Please let me know if you can submit a new referral.    Thank you!

## 2022-10-03 ENCOUNTER — TELEPHONE (OUTPATIENT)
Dept: CASE MANAGEMENT | Age: 42
End: 2022-10-03

## 2022-10-03 DIAGNOSIS — Z00.00 ADULT GENERAL MEDICAL EXAM: Primary | ICD-10-CM

## 2022-10-03 NOTE — TELEPHONE ENCOUNTER
Patient is due for DM follow up and labs w/PCP. Appt scheduled for 11/28/22. Please order pertinent labs and urine microalbumin. Thank you.

## 2022-10-21 ENCOUNTER — NURSE TRIAGE (OUTPATIENT)
Dept: INTERNAL MEDICINE CLINIC | Facility: CLINIC | Age: 42
End: 2022-10-21

## 2022-10-21 ENCOUNTER — OFFICE VISIT (OUTPATIENT)
Dept: FAMILY MEDICINE CLINIC | Facility: CLINIC | Age: 42
End: 2022-10-21
Payer: COMMERCIAL

## 2022-10-21 VITALS
DIASTOLIC BLOOD PRESSURE: 81 MMHG | WEIGHT: 230.13 LBS | OXYGEN SATURATION: 98 % | BODY MASS INDEX: 36.12 KG/M2 | RESPIRATION RATE: 18 BRPM | HEIGHT: 67 IN | TEMPERATURE: 98 F | HEART RATE: 75 BPM | SYSTOLIC BLOOD PRESSURE: 139 MMHG

## 2022-10-21 DIAGNOSIS — L03.012 PARONYCHIA OF LEFT MIDDLE FINGER: Primary | ICD-10-CM

## 2022-10-25 ENCOUNTER — TELEPHONE (OUTPATIENT)
Dept: FAMILY MEDICINE CLINIC | Facility: CLINIC | Age: 42
End: 2022-10-25

## 2022-10-25 DIAGNOSIS — L03.012 PARONYCHIA OF LEFT MIDDLE FINGER: Primary | ICD-10-CM

## 2022-10-25 RX ORDER — CEPHALEXIN 500 MG/1
500 CAPSULE ORAL 3 TIMES DAILY
Qty: 21 CAPSULE | Refills: 0 | Status: SHIPPED | OUTPATIENT
Start: 2022-10-25

## 2022-10-25 NOTE — TELEPHONE ENCOUNTER
[facility] visit follow up. Patient/parent called patient line. Returned call. Reports finger is not improving. Now reports oozing puss and still painful. Was doing soaks and bactroban ointment without change. Will sent abx rx to preferred pharmacy. Advised to continue OTC recommendations and to complete prescribed medications. Verbalized understanding. To call [phone] for any questions or concerns.

## 2022-11-01 ENCOUNTER — OFFICE VISIT (OUTPATIENT)
Dept: INTERNAL MEDICINE CLINIC | Facility: CLINIC | Age: 42
End: 2022-11-01
Payer: COMMERCIAL

## 2022-11-01 VITALS
SYSTOLIC BLOOD PRESSURE: 140 MMHG | HEIGHT: 67 IN | DIASTOLIC BLOOD PRESSURE: 92 MMHG | RESPIRATION RATE: 16 BRPM | BODY MASS INDEX: 36.57 KG/M2 | WEIGHT: 233 LBS | HEART RATE: 62 BPM

## 2022-11-01 DIAGNOSIS — L03.012 PARONYCHIA OF LEFT MIDDLE FINGER: Primary | ICD-10-CM

## 2022-11-01 PROCEDURE — 3080F DIAST BP >= 90 MM HG: CPT | Performed by: NURSE PRACTITIONER

## 2022-11-01 PROCEDURE — 3077F SYST BP >= 140 MM HG: CPT | Performed by: NURSE PRACTITIONER

## 2022-11-01 PROCEDURE — 99213 OFFICE O/P EST LOW 20 MIN: CPT | Performed by: NURSE PRACTITIONER

## 2022-11-01 PROCEDURE — 3008F BODY MASS INDEX DOCD: CPT | Performed by: NURSE PRACTITIONER

## 2022-11-01 RX ORDER — CEPHALEXIN 500 MG/1
500 CAPSULE ORAL 3 TIMES DAILY
Qty: 9 CAPSULE | Refills: 0 | Status: SHIPPED | OUTPATIENT
Start: 2022-11-01 | End: 2022-11-04

## 2022-11-09 ENCOUNTER — TELEPHONE (OUTPATIENT)
Dept: INTERNAL MEDICINE CLINIC | Facility: CLINIC | Age: 42
End: 2022-11-09

## 2022-11-09 NOTE — TELEPHONE ENCOUNTER
Patient called, verified Name and . She was reevaluated on  for paronychia of left middle finger. Initially seen on 10/21 for left middle finger pain after removing cuticles 5 days prior to this visit. Advised to take 3 more days' worth of antibiotic for which she completed on . However, patient states she remains to have mild pain, swelling, redness and tenderness in the area. She denies fever. Patient is concerned since she is a diabetic. She has multiple allergies to antibiotics with itching and rash for reaction, but is willing to have the reaction just to be prescribed different antibiotics if necessary. Appointment scheduled.     Future Appointments   Date Time Provider Naila Christina   11/10/2022  9:40 AM Paulo Dotson PA-C [facility]   2022  6:15 PM Yelitza Stevenson MD [facility]   2022  1:00 PM YAAKOV Pratt E[facility]HTED[facility]

## 2022-11-10 ENCOUNTER — OFFICE VISIT (OUTPATIENT)
Dept: INTERNAL MEDICINE CLINIC | Facility: CLINIC | Age: 42
End: 2022-11-10
Payer: COMMERCIAL

## 2022-11-10 VITALS
HEIGHT: 67 IN | TEMPERATURE: 98 F | BODY MASS INDEX: 35.94 KG/M2 | HEART RATE: 98 BPM | DIASTOLIC BLOOD PRESSURE: 86 MMHG | SYSTOLIC BLOOD PRESSURE: 129 MMHG | WEIGHT: 229 LBS

## 2022-11-10 DIAGNOSIS — L03.012 PARONYCHIA OF LEFT MIDDLE FINGER: Primary | ICD-10-CM

## 2022-11-10 PROCEDURE — 3008F BODY MASS INDEX DOCD: CPT | Performed by: PHYSICIAN ASSISTANT

## 2022-11-10 PROCEDURE — 3074F SYST BP LT 130 MM HG: CPT | Performed by: PHYSICIAN ASSISTANT

## 2022-11-10 PROCEDURE — 3079F DIAST BP 80-89 MM HG: CPT | Performed by: PHYSICIAN ASSISTANT

## 2022-11-10 PROCEDURE — 99213 OFFICE O/P EST LOW 20 MIN: CPT | Performed by: PHYSICIAN ASSISTANT

## 2022-11-10 RX ORDER — AMOXICILLIN AND CLAVULANATE POTASSIUM 875; 125 MG/1; MG/1
1 TABLET, FILM COATED ORAL 2 TIMES DAILY
Qty: 20 TABLET | Refills: 0 | Status: SHIPPED | OUTPATIENT
Start: 2022-11-10 | End: 2022-11-20

## 2022-11-23 ENCOUNTER — TELEPHONE (OUTPATIENT)
Dept: FAMILY MEDICINE CLINIC | Facility: CLINIC | Age: 42
End: 2022-11-23

## 2022-11-30 ENCOUNTER — PATIENT MESSAGE (OUTPATIENT)
Dept: INTERNAL MEDICINE CLINIC | Facility: CLINIC | Age: 42
End: 2022-11-30

## 2022-12-01 RX ORDER — FLUCONAZOLE 150 MG/1
TABLET ORAL
Qty: 2 TABLET | Refills: 0 | Status: SHIPPED | OUTPATIENT
Start: 2022-12-01

## 2022-12-01 NOTE — TELEPHONE ENCOUNTER
Routed to [employer], patient recently on antibiotics, now with yeast infection, please refer to [employer] message. Seeking treatment.

## 2022-12-07 ENCOUNTER — OFFICE VISIT (OUTPATIENT)
Dept: OBGYN CLINIC | Facility: CLINIC | Age: 42
End: 2022-12-07
Payer: COMMERCIAL

## 2022-12-07 VITALS
BODY MASS INDEX: 36 KG/M2 | DIASTOLIC BLOOD PRESSURE: 86 MMHG | SYSTOLIC BLOOD PRESSURE: 135 MMHG | HEART RATE: 79 BPM | WEIGHT: 230.19 LBS

## 2022-12-07 DIAGNOSIS — Z12.31 ENCOUNTER FOR SCREENING MAMMOGRAM FOR MALIGNANT NEOPLASM OF BREAST: ICD-10-CM

## 2022-12-07 DIAGNOSIS — Z23 NEED FOR HPV VACCINATION: ICD-10-CM

## 2022-12-07 DIAGNOSIS — Z01.419 WELL WOMAN EXAM WITH ROUTINE GYNECOLOGICAL EXAM: Primary | ICD-10-CM

## 2022-12-07 DIAGNOSIS — Z12.4 SCREENING FOR CERVICAL CANCER: ICD-10-CM

## 2022-12-07 PROCEDURE — 3075F SYST BP GE 130 - 139MM HG: CPT | Performed by: NURSE PRACTITIONER

## 2022-12-07 PROCEDURE — 99396 PREV VISIT EST AGE 40-64: CPT | Performed by: NURSE PRACTITIONER

## 2022-12-07 PROCEDURE — 90651 9VHPV VACCINE 2/3 DOSE IM: CPT | Performed by: NURSE PRACTITIONER

## 2022-12-07 PROCEDURE — 3079F DIAST BP 80-89 MM HG: CPT | Performed by: NURSE PRACTITIONER

## 2022-12-07 PROCEDURE — G0438 PPPS, INITIAL VISIT: HCPCS | Performed by: NURSE PRACTITIONER

## 2022-12-07 PROCEDURE — 90471 IMMUNIZATION ADMIN: CPT | Performed by: NURSE PRACTITIONER

## 2022-12-08 LAB — HPV I/H RISK 1 DNA SPEC QL NAA+PROBE: NEGATIVE

## 2022-12-17 ENCOUNTER — OFFICE VISIT (OUTPATIENT)
Dept: DERMATOLOGY CLINIC | Facility: CLINIC | Age: 42
End: 2022-12-17
Payer: COMMERCIAL

## 2022-12-17 DIAGNOSIS — L65.9 HAIR LOSS: Primary | ICD-10-CM

## 2022-12-17 PROCEDURE — 99213 OFFICE O/P EST LOW 20 MIN: CPT | Performed by: PHYSICIAN ASSISTANT

## 2022-12-17 RX ORDER — FINASTERIDE 5 MG/1
2.5 TABLET, FILM COATED ORAL DAILY
Qty: 90 TABLET | Refills: 0 | Status: SHIPPED | OUTPATIENT
Start: 2022-12-17

## 2023-01-14 ENCOUNTER — LAB ENCOUNTER (OUTPATIENT)
Dept: LAB | Facility: HOSPITAL | Age: 43
End: 2023-01-14
Attending: INTERNAL MEDICINE
Payer: COMMERCIAL

## 2023-01-14 DIAGNOSIS — Z00.00 ADULT GENERAL MEDICAL EXAM: ICD-10-CM

## 2023-01-14 LAB
ALBUMIN SERPL-MCNC: 3.6 G/DL (ref 3.4–5)
ALBUMIN/GLOB SERPL: 1.1 {RATIO} (ref 1–2)
ALP LIVER SERPL-CCNC: 41 U/L
ALT SERPL-CCNC: 20 U/L
ANION GAP SERPL CALC-SCNC: 7 MMOL/L (ref 0–18)
AST SERPL-CCNC: 17 U/L (ref 15–37)
BILIRUB SERPL-MCNC: 0.5 MG/DL (ref 0.1–2)
BUN BLD-MCNC: 7 MG/DL (ref 7–18)
BUN/CREAT SERPL: 9 (ref 10–20)
CALCIUM BLD-MCNC: 8.9 MG/DL (ref 8.5–10.1)
CHLORIDE SERPL-SCNC: 106 MMOL/L (ref 98–112)
CHOLEST SERPL-MCNC: 175 MG/DL (ref ?–200)
CO2 SERPL-SCNC: 26 MMOL/L (ref 21–32)
CREAT BLD-MCNC: 0.78 MG/DL
CREAT UR-SCNC: 101 MG/DL
DEPRECATED RDW RBC AUTO: 40.1 FL (ref 35.1–46.3)
ERYTHROCYTE [DISTWIDTH] IN BLOOD BY AUTOMATED COUNT: 11.3 % (ref 11–15)
EST. AVERAGE GLUCOSE BLD GHB EST-MCNC: 128 MG/DL (ref 68–126)
FASTING PATIENT LIPID ANSWER: YES
FASTING STATUS PATIENT QL REPORTED: YES
GFR SERPLBLD BASED ON 1.73 SQ M-ARVRAT: 97 ML/MIN/1.73M2 (ref 60–?)
GLOBULIN PLAS-MCNC: 3.3 G/DL (ref 2.8–4.4)
GLUCOSE BLD-MCNC: 83 MG/DL (ref 70–99)
HBA1C MFR BLD: 6.1 % (ref ?–5.7)
HCT VFR BLD AUTO: 43.1 %
HDLC SERPL-MCNC: 87 MG/DL (ref 40–59)
HGB BLD-MCNC: 14.5 G/DL
LDLC SERPL CALC-MCNC: 72 MG/DL (ref ?–100)
MCH RBC QN AUTO: 32.5 PG (ref 26–34)
MCHC RBC AUTO-ENTMCNC: 33.6 G/DL (ref 31–37)
MCV RBC AUTO: 96.6 FL
MICROALBUMIN UR-MCNC: <0.5 MG/DL
NONHDLC SERPL-MCNC: 88 MG/DL (ref ?–130)
OSMOLALITY SERPL CALC.SUM OF ELEC: 285 MOSM/KG (ref 275–295)
PLATELET # BLD AUTO: 210 10(3)UL (ref 150–450)
POTASSIUM SERPL-SCNC: 3.9 MMOL/L (ref 3.5–5.1)
PROT SERPL-MCNC: 6.9 G/DL (ref 6.4–8.2)
RBC # BLD AUTO: 4.46 X10(6)UL
SODIUM SERPL-SCNC: 139 MMOL/L (ref 136–145)
TRIGL SERPL-MCNC: 86 MG/DL (ref 30–149)
TSI SER-ACNC: 0.84 MIU/ML (ref 0.36–3.74)
VLDLC SERPL CALC-MCNC: 13 MG/DL (ref 0–30)
WBC # BLD AUTO: 5.6 X10(3) UL (ref 4–11)

## 2023-01-14 PROCEDURE — 80061 LIPID PANEL: CPT

## 2023-01-14 PROCEDURE — 82570 ASSAY OF URINE CREATININE: CPT

## 2023-01-14 PROCEDURE — 3061F NEG MICROALBUMINURIA REV: CPT | Performed by: INTERNAL MEDICINE

## 2023-01-14 PROCEDURE — 82043 UR ALBUMIN QUANTITATIVE: CPT

## 2023-01-14 PROCEDURE — 83036 HEMOGLOBIN GLYCOSYLATED A1C: CPT

## 2023-01-14 PROCEDURE — 3044F HG A1C LEVEL LT 7.0%: CPT | Performed by: INTERNAL MEDICINE

## 2023-01-14 PROCEDURE — 80053 COMPREHEN METABOLIC PANEL: CPT

## 2023-01-14 PROCEDURE — 85027 COMPLETE CBC AUTOMATED: CPT

## 2023-01-14 PROCEDURE — 36415 COLL VENOUS BLD VENIPUNCTURE: CPT

## 2023-01-14 PROCEDURE — 84443 ASSAY THYROID STIM HORMONE: CPT

## 2023-01-16 ENCOUNTER — OFFICE VISIT (OUTPATIENT)
Dept: INTERNAL MEDICINE CLINIC | Facility: CLINIC | Age: 43
End: 2023-01-16

## 2023-01-16 VITALS
BODY MASS INDEX: 36.88 KG/M2 | RESPIRATION RATE: 14 BRPM | OXYGEN SATURATION: 97 % | HEART RATE: 84 BPM | DIASTOLIC BLOOD PRESSURE: 100 MMHG | HEIGHT: 67 IN | WEIGHT: 235 LBS | SYSTOLIC BLOOD PRESSURE: 144 MMHG

## 2023-01-16 DIAGNOSIS — E11.9 TYPE 2 DIABETES MELLITUS WITHOUT COMPLICATION, WITHOUT LONG-TERM CURRENT USE OF INSULIN (HCC): Primary | ICD-10-CM

## 2023-01-16 DIAGNOSIS — Z23 INFLUENZA VACCINE NEEDED: ICD-10-CM

## 2023-01-16 DIAGNOSIS — R03.0 ELEVATED BP WITHOUT DIAGNOSIS OF HYPERTENSION: ICD-10-CM

## 2023-01-16 DIAGNOSIS — F33.40 RECURRENT MAJOR DEPRESSIVE DISORDER, IN REMISSION (HCC): ICD-10-CM

## 2023-01-16 PROBLEM — E66.01 MORBID (SEVERE) OBESITY DUE TO EXCESS CALORIES (HCC): Status: ACTIVE | Noted: 2023-01-16

## 2023-01-16 PROCEDURE — 90471 IMMUNIZATION ADMIN: CPT | Performed by: INTERNAL MEDICINE

## 2023-01-16 PROCEDURE — 3080F DIAST BP >= 90 MM HG: CPT | Performed by: INTERNAL MEDICINE

## 2023-01-16 PROCEDURE — 99214 OFFICE O/P EST MOD 30 MIN: CPT | Performed by: INTERNAL MEDICINE

## 2023-01-16 PROCEDURE — 3077F SYST BP >= 140 MM HG: CPT | Performed by: INTERNAL MEDICINE

## 2023-01-16 PROCEDURE — 90686 IIV4 VACC NO PRSV 0.5 ML IM: CPT | Performed by: INTERNAL MEDICINE

## 2023-01-16 PROCEDURE — 3008F BODY MASS INDEX DOCD: CPT | Performed by: INTERNAL MEDICINE

## 2023-01-16 RX ORDER — DULOXETIN HYDROCHLORIDE 60 MG/1
60 CAPSULE, DELAYED RELEASE ORAL DAILY
Qty: 90 CAPSULE | Refills: 0 | Status: SHIPPED | OUTPATIENT
Start: 2023-01-16

## 2023-01-17 NOTE — ADDENDUM NOTE
Addended by: Elda Mohr on: 1/16/2023 06:49 PM     Modules accepted: Orders
denies any h/o disease in first degree relatives

## 2023-02-02 ENCOUNTER — PATIENT MESSAGE (OUTPATIENT)
Dept: INTERNAL MEDICINE CLINIC | Facility: CLINIC | Age: 43
End: 2023-02-02

## 2023-02-02 NOTE — TELEPHONE ENCOUNTER
From: Tri Cortez  To: Astrid Juarez MD  Sent: 2/2/2023 10:18 AM CST  Subject: Diabetes     I have a weird question. . My job is shopping for new insurance and the agent has asked if i have a pre existing condition such as diabetes. Am I still diabetic, or has it been cured (for now?) due to the surgery?

## 2023-03-06 ENCOUNTER — OFFICE VISIT (OUTPATIENT)
Dept: INTERNAL MEDICINE CLINIC | Facility: CLINIC | Age: 43
End: 2023-03-06

## 2023-03-06 VITALS
WEIGHT: 235 LBS | SYSTOLIC BLOOD PRESSURE: 130 MMHG | HEIGHT: 67 IN | OXYGEN SATURATION: 99 % | DIASTOLIC BLOOD PRESSURE: 82 MMHG | HEART RATE: 76 BPM | RESPIRATION RATE: 14 BRPM | BODY MASS INDEX: 36.88 KG/M2

## 2023-03-06 DIAGNOSIS — F10.20 ALCOHOL USE DISORDER, MODERATE, DEPENDENCE (HCC): ICD-10-CM

## 2023-03-06 DIAGNOSIS — R10.13 EPIGASTRIC PAIN: Primary | ICD-10-CM

## 2023-03-06 DIAGNOSIS — E11.9 TYPE 2 DIABETES MELLITUS WITHOUT COMPLICATION, WITHOUT LONG-TERM CURRENT USE OF INSULIN (HCC): ICD-10-CM

## 2023-03-06 PROBLEM — E66.9 CLASS 2 OBESITY: Status: RESOLVED | Noted: 2022-06-14 | Resolved: 2023-03-06

## 2023-03-06 PROBLEM — E66.01 MORBID (SEVERE) OBESITY DUE TO EXCESS CALORIES (HCC): Status: RESOLVED | Noted: 2023-01-16 | Resolved: 2023-03-06

## 2023-03-06 PROBLEM — E66.812 CLASS 2 OBESITY: Status: RESOLVED | Noted: 2022-06-14 | Resolved: 2023-03-06

## 2023-03-06 PROCEDURE — 3075F SYST BP GE 130 - 139MM HG: CPT | Performed by: INTERNAL MEDICINE

## 2023-03-06 PROCEDURE — 3079F DIAST BP 80-89 MM HG: CPT | Performed by: INTERNAL MEDICINE

## 2023-03-06 PROCEDURE — 3008F BODY MASS INDEX DOCD: CPT | Performed by: INTERNAL MEDICINE

## 2023-03-06 PROCEDURE — 99214 OFFICE O/P EST MOD 30 MIN: CPT | Performed by: INTERNAL MEDICINE

## 2023-03-06 RX ORDER — MULTIVIT,IRON,MINERALS/LUTEIN
TABLET ORAL DAILY
COMMUNITY

## 2023-04-17 RX ORDER — DULOXETIN HYDROCHLORIDE 60 MG/1
60 CAPSULE, DELAYED RELEASE ORAL DAILY
Qty: 90 CAPSULE | Refills: 3 | Status: SHIPPED | OUTPATIENT
Start: 2023-04-17

## 2023-04-17 NOTE — TELEPHONE ENCOUNTER
Refill passed per [place] [employer], [facility] protocol.       Requested Prescriptions   Pending Prescriptions Disp Refills    DULOXETINE 60 MG Oral Cap DR Particles [Pharmacy Med Name: DULOXETINE DR 60MG CAPSULES] 90 capsule 0     Sig: TAKE 1 CAPSULE(60 MG) BY MOUTH DAILY       Psychiatric Non-Scheduled (Anti-Anxiety) Passed - 4/16/2023 10:18 AM        Passed - In person appointment or virtual visit in the past 6 mos or appointment in next 3 mos     Recent Outpatient Visits              1 month ago Epigastric pain    Scottie Treadwell MD    Office Visit    3 months ago Type 2 diabetes mellitus without complication, without long-term current use of insulin [facility])    Tiffany Roy MD    Office Visit    4 months ago Hair loss    [address], [address] [facility] [place], [place]    Office Visit    4 months ago Well woman exam with routine gynecological exam    [address], [address] [facility],[address], [facility]    Office Visit    5 months ago Paronychia of left middle finger    [facility], [address] [facility] Severiano Peon, PA-C    Office Visit                                Recent Outpatient Visits              1 month ago Epigastric pain    Tiffany Roy MD    Office Visit    3 months ago Type 2 diabetes mellitus without complication, without long-term current use of insulin [facility])    Tiffany Roy MD    Office Visit    4 months ago Hair loss    Maikel Roy Paphos, PA-C    Office Visit    4 months ago Well woman exam with routine gynecological exam    [address], [address]    Office Visit    5 months ago Paronychia of left middle finger    [facility], [address] [place], [place]    Office Visit

## 2023-06-19 NOTE — PROGRESS NOTES
Shelly Jeong is a 39year old female.     HPI:       Patient History:  Past Medical History   Diagnosis Date   • Diabetes ([facility] [address] 2012   • Hyperlipidemia    • Varicose vein of leg    • Retinal detachment- right eye 6/8/2017       Surgical History: [facility] types were placed in this encounter. Meds This Visit:    No prescriptions requested or ordered in this encounter     Follow up instructions:  No Follow-up on file.     6/8/2017  Scribed by: Oscar Fierro MD Unable to assess

## 2023-06-23 ENCOUNTER — TELEPHONE (OUTPATIENT)
Dept: INTERNAL MEDICINE CLINIC | Facility: CLINIC | Age: 43
End: 2023-06-23

## 2023-06-23 NOTE — TELEPHONE ENCOUNTER
Spoke with pt and congratulated on 4 year post op of RYGB and friendly reminder to schedule annual visits with providers. Pt agreeable and thanked writer for call.

## 2024-03-13 ENCOUNTER — PATIENT MESSAGE (OUTPATIENT)
Dept: INTERNAL MEDICINE CLINIC | Facility: CLINIC | Age: 44
End: 2024-03-13

## 2024-03-27 ENCOUNTER — OFFICE VISIT (OUTPATIENT)
Dept: INTERNAL MEDICINE CLINIC | Facility: CLINIC | Age: 44
End: 2024-03-27
Payer: COMMERCIAL

## 2024-03-27 VITALS
TEMPERATURE: 98 F | OXYGEN SATURATION: 98 % | HEART RATE: 72 BPM | BODY MASS INDEX: 39.11 KG/M2 | WEIGHT: 249.19 LBS | SYSTOLIC BLOOD PRESSURE: 122 MMHG | HEIGHT: 67 IN | DIASTOLIC BLOOD PRESSURE: 82 MMHG

## 2024-03-27 DIAGNOSIS — E11.9 TYPE 2 DIABETES MELLITUS WITHOUT COMPLICATION, WITHOUT LONG-TERM CURRENT USE OF INSULIN (HCC): ICD-10-CM

## 2024-03-27 DIAGNOSIS — Z12.31 BREAST CANCER SCREENING BY MAMMOGRAM: ICD-10-CM

## 2024-03-27 DIAGNOSIS — L03.115 CELLULITIS OF RIGHT LOWER EXTREMITY: Primary | ICD-10-CM

## 2024-03-27 PROCEDURE — 3008F BODY MASS INDEX DOCD: CPT | Performed by: INTERNAL MEDICINE

## 2024-03-27 PROCEDURE — 99214 OFFICE O/P EST MOD 30 MIN: CPT | Performed by: INTERNAL MEDICINE

## 2024-03-27 PROCEDURE — 3074F SYST BP LT 130 MM HG: CPT | Performed by: INTERNAL MEDICINE

## 2024-03-27 PROCEDURE — 3079F DIAST BP 80-89 MM HG: CPT | Performed by: INTERNAL MEDICINE

## 2024-03-27 RX ORDER — CEPHALEXIN 500 MG/1
500 CAPSULE ORAL 3 TIMES DAILY
Qty: 21 CAPSULE | Refills: 0 | Status: SHIPPED | OUTPATIENT
Start: 2024-03-27 | End: 2024-04-03

## 2024-03-27 NOTE — PROGRESS NOTES
Leisa Gonzalez is a 43 year old female.  Chief Complaint   Patient presents with    Fever     High fever Sunday night, 103.5, did had body aches,     Swollen Feet     Right foot swollen only, noticed Monday night    Fatigue     Feeling this way all the time     HPI:   43-year-old pleasant lady here for evaluation of right foot pain and swelling and fever.  She informed me that she had a fever on Sunday and Monday noticed right foot was swollen and painful.  She also has varicosities.  Her diabetes is controlled without any medications.      Current Outpatient Medications   Medication Sig Dispense Refill    cephalexin 500 MG Oral Cap Take 1 capsule (500 mg total) by mouth 3 (three) times daily for 7 days. 21 capsule 0    Prenatal Vit-Fe Fumarate-FA (MULTI PRENATAL) 27-0.8 MG Oral Tab Take by mouth daily.      finasteride 5 MG Oral Tab Take 0.5 tablets (2.5 mg total) by mouth daily. 90 tablet 0    IRON CR OR Take by mouth.      ALPRAZolam 0.25 MG Oral Tab Take 1 tablet (0.25 mg total) by mouth nightly as needed for Sleep or Anxiety. 30 tablet 0    cholecalciferol 50 MCG (2000 UT) Oral Cap Take 1 capsule (2,000 Units total) by mouth daily.      Cyanocobalamin (B-12) 500 MCG Oral Tab Take by mouth.        Past Medical History:   Diagnosis Date    Anxiety state     Class 2 obesity 6/14/2022    Depression     Diabetes ([facility]) 2012    Genital warts 2022    High cholesterol     Morbid (severe) obesity due to excess calories ([facility]) 1/16/2023    Morbid obesity with BMI of 50.0-59.9, adult ([facility])     Preop examination 06/08/2017    Retinal detachment- right eye 06/08/2017    Varicose vein of leg       Past Surgical History:   Procedure Laterality Date    KNEE ARTHROSCOPY      left knee    LAP GASTRIC BYPASS/SLICK-EN-Y      OTHER SURGICAL HISTORY Right 02/2017    vein ablation    REPAIR DETACH RETINA,SCLERAL BUCKLE Left 03/05/2018    REPAIR RETINAL DETACH, SCLERAL BUCKLE - OD - RIGHT EYE Right 06/08/2017    Scleral buckle OD (SB,  cryo, drainage of SRF) for macula off retinal detachment- Dr. Mason    TONSILLECTOMY        Social History:  Social History     Socioeconomic History    Marital status: Single   Tobacco Use    Smoking status: Never    Smokeless tobacco: Never   Substance and Sexual Activity    Alcohol use: Yes     Alcohol/week: 56.0 standard drinks of alcohol     Types: 56 Shots of liquor per week     Comment: about 325 ml per night    Drug use: Yes     Frequency: 7.0 times per week     Types: Cannabis     Comment: smokes, occasional edible for insomnia    Sexual activity: Not Currently   Other Topics Concern    Outdoor occupation No    Breast feeding No    Reaction to local anesthetic No    Pt has a pacemaker No    Pt has a defibrillator No      Family History   Problem Relation Age of Onset    Diabetes Father     Heart Disorder Father         CABG x3    Hypertension Mother     Breast Cancer Other     Schizophrenia Maternal Uncle     Alcohol and Other Disorders Associated Maternal Uncle     Alcohol and Other Disorders Associated Maternal Aunt     Alcohol and Other Disorders Associated Maternal Aunt     Alcohol and Other Disorders Associated Maternal Uncle     Alcohol and Other Disorders Associated Paternal Aunt     Alcohol and Other Disorders Associated Paternal Uncle     Alcohol and Other Disorders Associated Paternal Uncle     Alcohol and Other Disorders Associated Cousin     Substance Abuse Cousin     Alcohol and Other Disorders Associated Cousin     Substance Abuse Cousin     Alcohol and Other Disorders Associated Cousin     Substance Abuse Cousin     Alcohol and Other Disorders Associated Cousin     Substance Abuse Cousin     Glaucoma Neg     Macular degeneration Neg       Allergies   Allergen Reactions    Clindamycin ITCHING     Also skin reddness    Doxycycline ITCHING     Redness of skin    Other RASH     dermabond    Sulfa Antibiotics ITCHING     Skin reddness    Vancomycin ITCHING     Skin redness        REVIEW OF  SYSTEMS:   Review of Systems   Review of Systems   Constitutional: Negative for activity change, appetite change and fever.   HENT: Negative for congestion and voice change.    Respiratory: Negative for cough and shortness of breath.    Cardiovascular: Negative for chest pain.   Gastrointestinal: Negative for abdominal distention, abdominal pain and vomiting.   Genitourinary: Negative for hematuria.   Skin: Redness present  Psychiatric/Behavioral: Negative for behavioral problems.   Wt Readings from Last 5 Encounters:   03/27/24 249 lb 3.2 oz (113 kg)   03/06/23 235 lb (106.6 kg)   01/16/23 235 lb (106.6 kg)   12/07/22 230 lb 3.2 oz (104.4 kg)   11/10/22 229 lb (103.9 kg)     Body mass index is 39.03 kg/m².      EXAM:   /82   Pulse 72   Temp 97.7 °F (36.5 °C) (Temporal)   Ht 5' 7\" (1.702 m)   Wt 249 lb 3.2 oz (113 kg)   LMP 03/14/2024 (Approximate)   SpO2 98%   BMI 39.03 kg/m²   Physical Exam   Constitutional:       Appearance: Normal appearance.   HENT:      Head: Normocephalic.   Cardiovascular:      Rate and Rhythm: Normal rate and regular rhythm.      Heart sounds: Normal heart sounds. No murmur heard.  Pulmonary:      Effort: Pulmonary effort is normal.      Breath sounds: Normal breath sounds. No rhonchi or rales.   Right foot-warmth present.  Swelling present.  Redness present.  Neurological:      General: No focal deficit present.      Mental Status: He is alert and oriented to person, place, and time. Mental status is at baseline.   Psychiatric:         Mood and Affect: Mood normal.         Behavior: Behavior normal.   Bilateral barefoot skin diabetic exam is normal, visualized feet and the appearance is normal.  Bilateral monofilament/sensation of both feet is normal.  Pulsation pedal pulse exam of both lower legs/feet is normal as well.  Signs of cellulitis present on the dorsal aspect of the foot    ASSESSMENT AND PLAN:   1. Cellulitis of right lower extremity  Will treat with Keflex.   Encouraged her to elevate legs up.  If there is no improvement, she will contact me.    2. Breast cancer screening by mammogram    - [facility] JOLANTA 2D+3D SCREENING BILAT (CPT=77067/71172); Future    3. Type 2 diabetes mellitus without complication, without long-term current use of insulin (HCC)  A1c is good.  Foot exam completed today.  She follows up with ophthalmology.  Labs ordered.  - CBC, Platelet; No Differential; Future  - Comp Metabolic Panel (14); Future  - Hemoglobin A1C; Future  - Lipid Panel; Future  - TSH W Reflex To Free T4; Future  - Vitamin D; Future  - Microalb/Creat Ratio, Random Urine; Future  - [facility] JOLANTA 2D+3D SCREENING BILAT (CPT=77067/97665); Future    Plan: As above      The patient indicates understanding of these issues and agrees to the plan.  No follow-ups on file.    This note was prepared using [employer] voice recognition dictation software. As a result errors may occur. When identified these errors have been corrected. While every attempt is made to correct errors during dictation discrepancies may still exist.

## 2024-04-03 ENCOUNTER — NURSE TRIAGE (OUTPATIENT)
Dept: INTERNAL MEDICINE CLINIC | Facility: CLINIC | Age: 44
End: 2024-04-03

## 2024-04-03 RX ORDER — FLUCONAZOLE 150 MG/1
150 TABLET ORAL ONCE
Qty: 1 TABLET | Refills: 0 | Status: SHIPPED | OUTPATIENT
Start: 2024-04-03 | End: 2024-04-03

## 2024-04-03 NOTE — TELEPHONE ENCOUNTER
Patient advised medication fluconazole 150 mg oral tab to take once for one dose sent to [facility]. Taot will call back if symptoms do not improve.

## 2024-04-03 NOTE — TELEPHONE ENCOUNTER
Action Requested: Summary for Provider     []  Critical Lab, Recommendations Needed  [x] Need Additional Advice  []   FYI    []   Need Orders  [x] Need Medications Sent to Pharmacy  []  Other     SUMMARY: Per protocol, patient should be seen in office today or tomorrow. She is requesting prescription.     Reason for call: Yeast Infection  Onset: Data Unavailable    Spoke to patient. She has been on an antibiotics (Cephalexin) for cellulitis that she believes has caused a yeast infection. She is having vaginal itching for 4 or 5 days.     She has tried probiotics and a cream OTC but  no relief.     Dr. Hearn, patient requesting prescription for yeast sent to [facility] in [place]. Please advise.     Reason for Disposition   MODERATE-SEVERE itching (i.e., interferes with school, work, or sleep)    Protocols used: Vaginal Symptoms-A-OH

## 2024-04-10 ENCOUNTER — LAB ENCOUNTER (OUTPATIENT)
Dept: LAB | Age: 44
End: 2024-04-10
Attending: NURSE PRACTITIONER
Payer: COMMERCIAL

## 2024-04-10 ENCOUNTER — OFFICE VISIT (OUTPATIENT)
Dept: OBGYN CLINIC | Facility: CLINIC | Age: 44
End: 2024-04-10
Payer: COMMERCIAL

## 2024-04-10 VITALS
SYSTOLIC BLOOD PRESSURE: 139 MMHG | BODY MASS INDEX: 39 KG/M2 | DIASTOLIC BLOOD PRESSURE: 84 MMHG | HEART RATE: 83 BPM | WEIGHT: 247.38 LBS

## 2024-04-10 DIAGNOSIS — Z01.419 WELL WOMAN EXAM WITH ROUTINE GYNECOLOGICAL EXAM: Primary | ICD-10-CM

## 2024-04-10 DIAGNOSIS — E11.9 TYPE 2 DIABETES MELLITUS WITHOUT COMPLICATION, WITHOUT LONG-TERM CURRENT USE OF INSULIN (HCC): ICD-10-CM

## 2024-04-10 DIAGNOSIS — A63.0 GENITAL WARTS: ICD-10-CM

## 2024-04-10 DIAGNOSIS — R53.83 OTHER FATIGUE: ICD-10-CM

## 2024-04-10 DIAGNOSIS — Z97.5 PRESENCE OF IUD: ICD-10-CM

## 2024-04-10 LAB
ALBUMIN SERPL-MCNC: 4.4 G/DL (ref 3.2–4.8)
ALBUMIN/GLOB SERPL: 1.6 {RATIO} (ref 1–2)
ALP LIVER SERPL-CCNC: 44 U/L
ALT SERPL-CCNC: 42 U/L
ANION GAP SERPL CALC-SCNC: 5 MMOL/L (ref 0–18)
AST SERPL-CCNC: 41 U/L (ref ?–34)
BILIRUB SERPL-MCNC: 0.6 MG/DL (ref 0.3–1.2)
BUN BLD-MCNC: 11 MG/DL (ref 9–23)
BUN/CREAT SERPL: 12 (ref 10–20)
CALCIUM BLD-MCNC: 9.2 MG/DL (ref 8.7–10.4)
CHLORIDE SERPL-SCNC: 106 MMOL/L (ref 98–112)
CHOLEST SERPL-MCNC: 191 MG/DL (ref ?–200)
CO2 SERPL-SCNC: 27 MMOL/L (ref 21–32)
CREAT BLD-MCNC: 0.92 MG/DL
CREAT UR-SCNC: 224.6 MG/DL
DEPRECATED HBV CORE AB SER IA-ACNC: 23.4 NG/ML
DEPRECATED RDW RBC AUTO: 38.8 FL (ref 35.1–46.3)
EGFRCR SERPLBLD CKD-EPI 2021: 79 ML/MIN/1.73M2 (ref 60–?)
ERYTHROCYTE [DISTWIDTH] IN BLOOD BY AUTOMATED COUNT: 11.5 % (ref 11–15)
EST. AVERAGE GLUCOSE BLD GHB EST-MCNC: 160 MG/DL (ref 68–126)
FASTING PATIENT LIPID ANSWER: YES
FASTING STATUS PATIENT QL REPORTED: YES
GLOBULIN PLAS-MCNC: 2.7 G/DL (ref 2.8–4.4)
GLUCOSE BLD-MCNC: 175 MG/DL (ref 70–99)
HBA1C MFR BLD: 7.2 % (ref ?–5.7)
HCT VFR BLD AUTO: 41.6 %
HDLC SERPL-MCNC: 69 MG/DL (ref 40–59)
HGB BLD-MCNC: 14.6 G/DL
IRON SATN MFR SERPL: 29 %
IRON SERPL-MCNC: 111 UG/DL
LDLC SERPL CALC-MCNC: 94 MG/DL (ref ?–100)
MCH RBC QN AUTO: 32.4 PG (ref 26–34)
MCHC RBC AUTO-ENTMCNC: 35.1 G/DL (ref 31–37)
MCV RBC AUTO: 92.4 FL
MICROALBUMIN UR-MCNC: 0.3 MG/DL
MICROALBUMIN/CREAT 24H UR-RTO: 1.3 UG/MG (ref ?–30)
NONHDLC SERPL-MCNC: 122 MG/DL (ref ?–130)
OSMOLALITY SERPL CALC.SUM OF ELEC: 290 MOSM/KG (ref 275–295)
PLATELET # BLD AUTO: 250 10(3)UL (ref 150–450)
POTASSIUM SERPL-SCNC: 4.3 MMOL/L (ref 3.5–5.1)
PROT SERPL-MCNC: 7.1 G/DL (ref 5.7–8.2)
RBC # BLD AUTO: 4.5 X10(6)UL
SODIUM SERPL-SCNC: 138 MMOL/L (ref 136–145)
TIBC SERPL-MCNC: 381 UG/DL (ref 250–425)
TRANSFERRIN SERPL-MCNC: 256 MG/DL (ref 250–380)
TRIGL SERPL-MCNC: 165 MG/DL (ref 30–149)
TSI SER-ACNC: 1.66 MIU/ML (ref 0.55–4.78)
VIT D+METAB SERPL-MCNC: 9.4 NG/ML (ref 30–100)
VLDLC SERPL CALC-MCNC: 27 MG/DL (ref 0–30)
WBC # BLD AUTO: 6.2 X10(3) UL (ref 4–11)

## 2024-04-10 PROCEDURE — 83540 ASSAY OF IRON: CPT

## 2024-04-10 PROCEDURE — 82043 UR ALBUMIN QUANTITATIVE: CPT

## 2024-04-10 PROCEDURE — G0438 PPPS, INITIAL VISIT: HCPCS | Performed by: NURSE PRACTITIONER

## 2024-04-10 PROCEDURE — 83036 HEMOGLOBIN GLYCOSYLATED A1C: CPT

## 2024-04-10 PROCEDURE — 80053 COMPREHEN METABOLIC PANEL: CPT

## 2024-04-10 PROCEDURE — 96127 BRIEF EMOTIONAL/BEHAV ASSMT: CPT | Performed by: NURSE PRACTITIONER

## 2024-04-10 PROCEDURE — 36415 COLL VENOUS BLD VENIPUNCTURE: CPT

## 2024-04-10 PROCEDURE — 84443 ASSAY THYROID STIM HORMONE: CPT

## 2024-04-10 PROCEDURE — 3079F DIAST BP 80-89 MM HG: CPT | Performed by: NURSE PRACTITIONER

## 2024-04-10 PROCEDURE — 82570 ASSAY OF URINE CREATININE: CPT

## 2024-04-10 PROCEDURE — 82728 ASSAY OF FERRITIN: CPT

## 2024-04-10 PROCEDURE — 85027 COMPLETE CBC AUTOMATED: CPT

## 2024-04-10 PROCEDURE — 80061 LIPID PANEL: CPT

## 2024-04-10 PROCEDURE — 82306 VITAMIN D 25 HYDROXY: CPT

## 2024-04-10 PROCEDURE — 99396 PREV VISIT EST AGE 40-64: CPT | Performed by: NURSE PRACTITIONER

## 2024-04-10 PROCEDURE — 3075F SYST BP GE 130 - 139MM HG: CPT | Performed by: NURSE PRACTITIONER

## 2024-04-10 PROCEDURE — 84466 ASSAY OF TRANSFERRIN: CPT

## 2024-04-10 RX ORDER — IMIQUIMOD 12.5 MG/.25G
1 CREAM TOPICAL
Qty: 12 EACH | Refills: 3 | Status: SHIPPED | OUTPATIENT
Start: 2024-04-10

## 2024-04-10 NOTE — PROGRESS NOTES
[facility]   Obstetrics and Gynecology    Leisa Gonzalez is a 43 year old female  Patient's last menstrual period was 2024 (approximate).   Chief Complaint   Patient presents with    Gyn Problem     HORMONES TESTED ALWAYS TIRED   . Patient last seen 2022 for annual. Desires annual today.    She is complaining of \"zero energy\" needs to lay down after work before making dinner. She also reports doing bare minimum on weekends. Symptoms started a year or so ago and getting worse. Thought was related to weather but even with nice weather.    She reports having a lot of nightmares and wakes her up, sometimes hard to get back to sleep. Sometimes will just wake in night. On occasion some hot flashes or night sweats.    Has orders to do labs with PCP. Hasn't been on medication since  for diabetes due to s/p gastric bypass.    She reports periods are monthly, reports flow varies from light to very heavy. On heaviest day changes super tampon 2 times a day. Menses lasts 5-7 days total.    Alcohol weekends only. Smoking marijuana most days- at night for sleep and anxiety.    Not exercising.    She also thinks she may have a few genital warts in vulvar area.    Pap: 2022 NILM, neg HPV; due next year  2019 NILM, neg HPV              Contraception:1/3/2020 paragard IUD  Mammo: 2022 normal; ordered    OBSTETRICS HISTORY:  OB History    Para Term  AB Living   1 0 0 0 1 0   SAB IAB Ectopic Multiple Live Births   0 0 0 0 0       GYNE HISTORY:  Menarche: 14 (4/10/2024 10:16 AM)  Period Cycle (Days): Monthly (4/10/2024 10:16 AM)  Period Duration (Days): 5-6 (4/10/2024 10:16 AM)  Period Flow: VARIES (4/10/2024 10:16 AM)  Use of Birth Control (if yes, specify type): Paraguard IUD (1/3/20) (4/10/2024 10:16 AM)  Hx Prior Abnormal Pap: No (4/10/2024 10:16 AM)  Pap Date: 22 (4/10/2024 10:16 AM)  Pap Result Notes: NEG HPV NEG (4/10/2024 10:16 AM)      History   Sexual Activity    Sexual  activity: Not Currently       MEDICAL HISTORY:  Past Medical History:    Anxiety state    Class 2 obesity    Depression    Diabetes (HCC)    Genital warts    High cholesterol    Morbid (severe) obesity due to excess calories (HCC)    Morbid obesity with BMI of 50.0-59.9, adult (HCC)    Preop examination    Retinal detachment- right eye    Varicose vein of leg       SOCIAL HISTORY:  Social History     Socioeconomic History    Marital status: Single     Spouse name: Not on file    Number of children: Not on file    Years of education: Not on file    Highest education level: Not on file   Occupational History    Not on file   Tobacco Use    Smoking status: Never    Smokeless tobacco: Never   Substance and Sexual Activity    Alcohol use: Yes     Alcohol/week: 56.0 standard drinks of alcohol     Types: 56 Shots of liquor per week     Comment: about 325 ml per night    Drug use: Yes     Frequency: 7.0 times per week     Types: Cannabis     Comment: smokes, occasional edible for insomnia    Sexual activity: Not Currently   Other Topics Concern    Grew up on a farm Not Asked    History of tanning Not Asked    Outdoor occupation No    Breast feeding No    Reaction to local anesthetic No    Pt has a pacemaker No    Pt has a defibrillator No   Social History Narrative    Not on file     Social Determinants of Health     Financial Resource Strain: Not on file   Food Insecurity: Not on file   Transportation Needs: Not on file   Physical Activity: Not on file   Stress: Not on file   Social Connections: Not on file   Housing Stability: Not on file       MEDICATIONS:    Current Outpatient Medications:     Imiquimod (ALDARA) 5 % External Cream, Apply 1 Application topically 3 (three) times a week. Apply at bedtime Monday / Wedn / Friday. Wash off in morning. Do this for 12 weeks, Disp: 12 each, Rfl: 3    finasteride 5 MG Oral Tab, Take 0.5 tablets (2.5 mg total) by mouth daily., Disp: 90 tablet, Rfl: 0    IRON CR OR, Take by mouth.,  Disp: , Rfl:     ALPRAZolam 0.25 MG Oral Tab, Take 1 tablet (0.25 mg total) by mouth nightly as needed for Sleep or Anxiety., Disp: 30 tablet, Rfl: 0    cholecalciferol 50 MCG (2000 UT) Oral Cap, Take 1 capsule (2,000 Units total) by mouth daily., Disp: , Rfl:     Cyanocobalamin (B-12) 500 MCG Oral Tab, Take by mouth., Disp: , Rfl:     ALLERGIES:    Allergies   Allergen Reactions    Clindamycin ITCHING     Also skin reddness    Doxycycline ITCHING     Redness of skin    Other RASH     dermabond    Sulfa Antibiotics ITCHING     Skin reddness    Vancomycin ITCHING     Skin redness         Review of Systems:  Constitutional:  see HPI  Cardiovascular:  denies chest pain or palpitations  Respiratory:  denies shortness of breath  Gastrointestinal:  denies heartburn, abdominal pain, diarrhea or constipation  Genitourinary:  denies dysuria, incontinence, abnormal vaginal discharge, vaginal itching   Musculoskeletal:  denies back pain.  Skin/Breast:  Denies any breast pain, lumps, or discharge.   Neurological:  denies headaches, extremity weakness or numbness.  Psychiatric: denies depression or anxiety.  Endocrine:   denies excessive thirst or urination. +fatigue  Heme/Lymph:  denies history of anemia, easy bruising or bleeding.      PHYSICAL EXAM:     Vitals:    04/10/24 1020   BP: 139/84   Pulse: 83   Weight: 247 lb 6.4 oz (112.2 kg)     Body mass index is 38.75 kg/m².     Patient offered chaperone, patient declined    Constitutional: well developed, well nourished  Head/Face: normocephalic  Lymphatic:no abnormal supraclavicular or axillary adenopathy is noted  Breast: normal without palpable masses, tenderness, asymmetry, nipple discharge, nipple retraction or skin changes  Abdomen:  soft, nontender, nondistended, no masses  Skin/Hair: no unusual rashes or bruises  Extremities: no edema, no cyanosis  Psychiatric:  Oriented to time, place, person and situation. Appropriate mood and affect    Pelvic Exam:  External  Genitalia: genital wart on right anterior vulva and one on perineum; normal appearance, hair distribution, and no lesions  Urethral Meatus:  normal in size, location, without lesions and prolapse  Bladder:  No fullness, masses or tenderness  Vagina:  Normal appearance without lesions, no abnormal discharge  Cervix:  +IUD strings, Normal without tenderness on motion  Uterus: normal in size, contour, position, mobility, without tenderness  Adnexa: normal without masses or tenderness  Perineum: normal  Anus: no hemorroids   Lymph node: no inguinal lymph nodes    Assessment & Plan:  Leisa was seen today for gyn problem.    Diagnoses and all orders for this visit:    Well woman exam with routine gynecological exam    Other fatigue  -     Ferritin; Future  -     Iron and TIBC; Future    Presence of IUD    Genital warts    Other orders  -     Imiquimod (ALDARA) 5 % External Cream; Apply 1 Application topically 3 (three) times a week. Apply at bedtime Monday / Wedn / Friday. Wash off in morning. Do this for 12 weeks      Pap due in 2025  Mammogram order in chart  IUD appears in situ  Genital wart seen - will rx aldara for 3 times a week - if not improved after 12 weeks rto  Regarding fatigue reviewed can be multifactoral - pcp has labs ordered - will add ferritin/iron studies. Follow up based on results  Rev'd diet, exercise, limiting alcohol.    YAAKOV Villa    This note was prepared using Dragon Medical voice recognition dictation software. As a result errors may occur. When identified these errors have been corrected. While every attempt is made to correct errors during dictation discrepancies may still exist.

## 2024-05-21 ENCOUNTER — PATIENT MESSAGE (OUTPATIENT)
Dept: INTERNAL MEDICINE CLINIC | Facility: CLINIC | Age: 44
End: 2024-05-21

## 2024-05-22 ENCOUNTER — TELEPHONE (OUTPATIENT)
Dept: INTERNAL MEDICINE CLINIC | Facility: CLINIC | Age: 44
End: 2024-05-22

## 2024-05-22 NOTE — TELEPHONE ENCOUNTER
I am not sure how to answer here.  I can only apologize to the patient for the way she felt however I really do not want to entertain abusive words and inappropriate words to anybody as I do not use this kind of words in my practice.    It is totally up to patient to seek health care in  her choice of location, doctors and system.  I saw another message saying that she would like to have a prescription for vitamin D and change antidepressants.    I think it is reasonable to send prescription for vitamin D and recheck the levels in 3 months.  If she is feeling depressed, I highly encourage her to seek behavioral health expertise that includes therapy and psychiatrist.    If there is anything I can help here, please let me know.  Thank you

## 2024-05-22 NOTE — TELEPHONE ENCOUNTER
Patient calling ( name and date of birth of patient verified )  very upset / swearing concerning labs results  especially Vitamin D and no action taken     Patient states she is not coming back to the office as she had  recent appointments  3/27    When this RN addressed th result note from Tiffanie Frias the patient said  \" she could go F  herself\"  and that Tiffanie chastised the patient      Dr. Hearn --    Allergies reviewed and pharmacy confirmed  Please advise and thank you.      Result note from Tiffanie:    Leisa Gonzalez     Your hemoglobin A1 C is 7.2- Out of control.  Vitamin D is low.     Blood counts are normal-you are not anemic  Kidney function is normal  Liver function is normal     Thyroid test is normal  Cholesterol numbers are in acceptable levels.        I encourage you to eat healthy, exercise for 30 to 40 minutes 3-4 times a week or as tolerated.  If you have any questions, please contact our office.       Please follow up with Dr. Hearn's nurse practitioner Tiffanie Frias for the following     Treatment plan for your diabetes  Treatment plan for your low vitamin D  Assess your leg cellulitis        Thank you.     Tiffanie Frias DNP  Working with       Component  Ref Range & Units 4/10/24 11:16 AM   Vitamin D, 25OH, Total  30.0 - 100.0 ng/mL 9.4 Low           Please see MyChart below    jean Gonzalez   to P Em Triage Support (supporting Alfred Hearn MD)       5/21/24  2:13 PM  I’d like to try a new anti depressant and perhaps a vitamin D prescription

## 2024-05-22 NOTE — TELEPHONE ENCOUNTER
Patient returned phone call. Patient was not happy that she needs to schedule an appointment. She had further questions to ask regarding this, call transferred to RN triage to assist her.

## 2024-05-22 NOTE — TELEPHONE ENCOUNTER
Patient notified of below through the following part of the message below:     \"I am not sure how to answer here.  I can only apologize to the patient for the way she felt however I really do not want to entertain abusive words and inappropriate words to anybody as I do not use this kind of words in my practice.     It is totally up to patient to seek health care in  her choice of location, doctors and system.\"     At this point patient interrupted nurse and asked for clarification. Nurse explained Dr. Hearn did not want to entertain the inappropriate language that was used earlier, patient interrupted nurse again and stated \"He doesn't want to entertain my language and I don't want to entertain his abisai daddy in the abisai. He can go fuck himself\" Call was abruptly disconnected at this time.     Information was communicated to supervisor.     PAIN

## 2024-06-06 ENCOUNTER — TELEPHONE (OUTPATIENT)
Dept: INTERNAL MEDICINE CLINIC | Facility: CLINIC | Age: 44
End: 2024-06-06

## 2024-06-28 ENCOUNTER — PATIENT MESSAGE (OUTPATIENT)
Dept: FAMILY MEDICINE CLINIC | Facility: CLINIC | Age: 44
End: 2024-06-28

## 2024-06-28 NOTE — TELEPHONE ENCOUNTER
From: Leisa Gonzalez  To: Devorah Orta  Sent: 6/28/2024 10:17 AM CDT  Subject: New prescription     I picked up my meds this morning and noticed that they gave me zoloft, but i thought you recommended wellbutrin instead? I’m also concerned that one has a chance of weight gain but other weight loss. Just wanted to make sure i’m taking the correct meds.

## 2024-07-29 ENCOUNTER — PATIENT OUTREACH (OUTPATIENT)
Dept: FAMILY MEDICINE CLINIC | Facility: CLINIC | Age: 44
End: 2024-07-29

## 2024-09-23 ENCOUNTER — OFFICE VISIT (OUTPATIENT)
Dept: FAMILY MEDICINE CLINIC | Facility: CLINIC | Age: 44
End: 2024-09-23
Payer: COMMERCIAL

## 2024-09-23 VITALS
OXYGEN SATURATION: 98 % | BODY MASS INDEX: 39.02 KG/M2 | TEMPERATURE: 99 F | HEART RATE: 82 BPM | WEIGHT: 248.63 LBS | HEIGHT: 67 IN | DIASTOLIC BLOOD PRESSURE: 84 MMHG | SYSTOLIC BLOOD PRESSURE: 136 MMHG

## 2024-09-23 DIAGNOSIS — L53.9 ERYTHEMA: ICD-10-CM

## 2024-09-23 DIAGNOSIS — R21 RASH: Primary | ICD-10-CM

## 2024-09-23 DIAGNOSIS — L03.115 CELLULITIS OF RIGHT LOWER EXTREMITY: ICD-10-CM

## 2024-09-23 PROCEDURE — 3008F BODY MASS INDEX DOCD: CPT

## 2024-09-23 PROCEDURE — 3079F DIAST BP 80-89 MM HG: CPT

## 2024-09-23 PROCEDURE — 3061F NEG MICROALBUMINURIA REV: CPT

## 2024-09-23 PROCEDURE — 99213 OFFICE O/P EST LOW 20 MIN: CPT

## 2024-09-23 PROCEDURE — 3051F HG A1C>EQUAL 7.0%<8.0%: CPT

## 2024-09-23 PROCEDURE — 3075F SYST BP GE 130 - 139MM HG: CPT

## 2024-09-23 RX ORDER — CEPHALEXIN 500 MG/1
500 CAPSULE ORAL 3 TIMES DAILY
Qty: 21 CAPSULE | Refills: 0 | Status: SHIPPED | OUTPATIENT
Start: 2024-09-23 | End: 2024-09-30

## 2024-09-23 RX ORDER — TRIAMCINOLONE ACETONIDE 1 MG/G
CREAM TOPICAL 2 TIMES DAILY
Qty: 60 G | Refills: 0 | Status: SHIPPED | OUTPATIENT
Start: 2024-09-23 | End: 2024-10-07

## 2024-09-23 NOTE — PROGRESS NOTES
Subjective:   Leisa Gonzalez is a 44 year old female who presents for Pain (Right leg veins hurt after high fever last friday, pain moving around, some redness )   Patient is a pleasant 44-year-old female past medical history consistent for anxiety, obesity, depression, diabetes, hyperlipidemia, varicose veins.  Patient presents to office today new to this provider for evaluation of leg pain     Of note patient had prior venous ablation 2/3/2017 with MD Brooks.    Patient states Thursday and Friday she had high fever up to 102-103 with generalized aches and fatigue. She took tylenol and motrin and got this down. She noticed on Saturday she had some right leg pain. She has some redness with warmth and itching. She walks her dog but no real outdoor exposures. She has hx of cellulitis and clot in leg as well. This feels like past cellulitis to her.         Past Medical History:    Anxiety state    Class 2 obesity    Depression    Diabetes (HCC)    Genital warts    High cholesterol    Morbid (severe) obesity due to excess calories (HCC)    Morbid obesity with BMI of 50.0-59.9, adult (HCC)    Preop examination    Retinal detachment- right eye    Varicose vein of leg      Past Surgical History:   Procedure Laterality Date    Knee arthroscopy      left knee    Lap gastric bypass/pauline-en-y      Other surgical history Right 02/2017    vein ablation    Repair detach retina,scleral buckle Left 03/05/2018    Repair retinal detach, scleral buckle - od - right eye Right 06/08/2017    Scleral buckle OD (SB, cryo, drainage of SRF) for macula off retinal detachment- Dr. Mason    Tonsillectomy          History/Other:    Chief Complaint Reviewed and Verified  Nursing Notes Reviewed and   Verified  Tobacco Reviewed  Allergies Reviewed  Medications Reviewed    Problem List Reviewed  Medical History Reviewed  Surgical History   Reviewed  OB Status Reviewed  Family History Reviewed  Social History   Reviewed          Tobacco:  She has never smoked tobacco.    Current Outpatient Medications   Medication Sig Dispense Refill    Magnesium 100 MG Oral Tab       cephALEXin (KEFLEX) 500 MG Oral Cap Take 1 capsule (500 mg total) by mouth 3 (three) times daily for 7 days. 21 capsule 0    triamcinolone 0.1 % External Cream Apply topically 2 (two) times daily for 14 days. 60 g 0    buPROPion  MG Oral Tablet 24 Hr Take 1 tablet (150 mg total) by mouth daily. 30 tablet 0    ergocalciferol 1.25 MG (89840 UT) Oral Cap Take 1 capsule (50,000 Units total) by mouth once a week. 12 capsule 3    ALPRAZolam 0.25 MG Oral Tab Take 1 tablet (0.25 mg total) by mouth nightly as needed. 30 tablet 0    Imiquimod (ALDARA) 5 % External Cream Apply 1 Application topically 3 (three) times a week. Apply at bedtime Monday / Wedn / Friday. Wash off in morning. Do this for 12 weeks 12 each 3    IRON CR OR Take by mouth.      cholecalciferol 50 MCG (2000 UT) Oral Cap Take 1 capsule (2,000 Units total) by mouth daily.      Cyanocobalamin (B-12) 500 MCG Oral Tab Take by mouth.           Review of Systems:  Review of Systems   Constitutional:  Positive for chills and fever.   Respiratory:  Negative for cough and shortness of breath.    Cardiovascular:  Negative for chest pain and leg swelling.   Skin:  Positive for color change and rash.   All other systems reviewed and are negative.        Objective:   /84   Pulse 82   Temp 98.7 °F (37.1 °C)   Ht 5' 7\" (1.702 m)   Wt 248 lb 9.6 oz (112.8 kg)   LMP 09/16/2024 (Within Days)   SpO2 98%   BMI 38.94 kg/m²  Estimated body mass index is 38.94 kg/m² as calculated from the following:    Height as of this encounter: 5' 7\" (1.702 m).    Weight as of this encounter: 248 lb 9.6 oz (112.8 kg).  Physical Exam  Vitals and nursing note reviewed.   Constitutional:       Appearance: Normal appearance. She is obese.   HENT:      Head: Normocephalic and atraumatic.      Right Ear: External ear normal.      Left Ear:  External ear normal.   Cardiovascular:      Rate and Rhythm: Normal rate and regular rhythm.      Pulses: Normal pulses.      Heart sounds: Normal heart sounds.   Pulmonary:      Effort: Pulmonary effort is normal.      Breath sounds: Normal breath sounds.   Skin:     General: Skin is warm and dry.      Capillary Refill: Capillary refill takes less than 2 seconds.      Findings: Erythema and rash present.      Comments: Generalized area right upper calf into upper legs with redness, warmth, mild swelling and utricaria. No visualized broken skin.     Neurological:      Mental Status: She is alert and oriented to person, place, and time.           Assessment & Plan:   1. Rash (Primary)  -     Triamcinolone Acetonide; Apply topically 2 (two) times daily for 14 days.  Dispense: 60 g; Refill: 0  2. Erythema  -     Cephalexin; Take 1 capsule (500 mg total) by mouth 3 (three) times daily for 7 days.  Dispense: 21 capsule; Refill: 0  3. Cellulitis of right lower extremity  -     Cephalexin; Take 1 capsule (500 mg total) by mouth 3 (three) times daily for 7 days.  Dispense: 21 capsule; Refill: 0    Cellulitis right lower extrem  With allergies will treat with keflex tid x 7 days  Add triamcinolone for itching bid  Educated on cool compresses  Red flags discussed.  Follow up 1 weeks    Patient aware of plan of care. All questions answered to satisfaction of the patient. Patient instructed to call office or reach out via Image Searchert if any issues arise. For urgent issues and/or reviewed red flags please proceed to the urgent care or ER.  Also, inform the nurse practitioner with any new symptoms or medication side effects.      Return in about 1 week (around 9/30/2024) for Annual physical.    YAAKOV Knowles, 9/23/2024, 8:04 AM

## 2024-10-01 DIAGNOSIS — F33.40 RECURRENT MAJOR DEPRESSIVE DISORDER, IN REMISSION (HCC): ICD-10-CM

## 2024-10-01 DIAGNOSIS — F41.9 ANXIETY: ICD-10-CM

## 2024-10-03 RX ORDER — BUPROPION HYDROCHLORIDE 150 MG/1
150 TABLET ORAL DAILY
Qty: 90 TABLET | Refills: 0 | Status: SHIPPED | OUTPATIENT
Start: 2024-10-03

## 2024-10-03 NOTE — TELEPHONE ENCOUNTER
Refill passed per [facility] protocol.    Last Telemedicine Visit 09/03/2024, Last Refill 09/03/2024    Is refill appropriate?     Requested Prescriptions   Pending Prescriptions Disp Refills    BUPROPION  MG Oral Tablet 24 Hr [Pharmacy Med Name: BUPROPION XL 150MG TABLETS (24 H)] 30 tablet 0     Sig: TAKE 1 TABLET(150 MG) BY MOUTH DAILY       Psychiatric Non-Scheduled (Anti-Anxiety) Passed - 10/1/2024  6:27 PM        Passed - In person appointment or virtual visit in the past 6 mos or appointment in next 3 mos     Recent Outpatient Visits              [facility]    [facility]    1 month ago Recurrent major depressive disorder, in remission ([facility]    [facility]    3 months ago Recurrent major depressive disorder, in remission ([facility]    5 months ago Well woman exam with routine gynecological exam    [facility] [place] [facility] Nelia Rucker APRN    Office Visit    6 months ago Cellulitis of right lower extremity    [facility] Alfred Hearn MD    Office Visit                      Passed - Depression Screening completed within the past 12 months           Recent Outpatient Visits              1 week ago [facility]    Office Visit    1 month ago Recurrent major depressive disorder, in remission ([facility]    [facility]    3 months ago Recurrent major depressive disorder, in remission ([facility]    Telemedicine    5 months ago Well woman exam with routine gynecological exam     [facility] - [facility] Nelia Rucker APRN    Office Visit    6 months ago Cellulitis of right lower extremity    [facility] [place]    Office Visit

## 2024-10-24 ENCOUNTER — TELEPHONE (OUTPATIENT)
Facility: CLINIC | Age: 44
End: 2024-10-24

## 2024-10-24 NOTE — TELEPHONE ENCOUNTER
Called pt, no answer, left VM to let her know mammogram is ordered (3/24) and to schedule at her convenience, left central scheduling number as well as a call back number if pt had any questions

## 2025-02-19 ENCOUNTER — PATIENT MESSAGE (OUTPATIENT)
Dept: FAMILY MEDICINE CLINIC | Facility: CLINIC | Age: 45
End: 2025-02-19

## 2025-02-19 DIAGNOSIS — F41.9 ANXIETY: ICD-10-CM

## 2025-02-20 NOTE — TELEPHONE ENCOUNTER
The patient called stating that she called and updated her insurance and she is asking what to do next. I informed her she can reschedule the appointment to receive the vaccine. She asked if it will be covered, I informed her if she has the same insurance she has always had as she stated previously I the call then it should be but she would need to call her insurance - the patient stated she would just call insurance and then ended the call.

## 2025-02-25 RX ORDER — ALPRAZOLAM 0.25 MG
0.25 TABLET ORAL NIGHTLY PRN
Qty: 30 TABLET | Refills: 0 | Status: SHIPPED | OUTPATIENT
Start: 2025-02-25

## 2025-02-25 NOTE — TELEPHONE ENCOUNTER
Please review; protocol failed/No Protocol      Recent Fills: No dispense history in the last 6 months    Last Rx Written: 06/26/2024    Last Office Visit: 09/23/2024     [Subsequent Evaluation] : a subsequent evaluation for [FreeTextEntry2] : ear, sinus

## 2025-05-27 DIAGNOSIS — E55.9 VITAMIN D DEFICIENCY: ICD-10-CM

## 2025-05-27 NOTE — TELEPHONE ENCOUNTER
Please review. Protocol Failed; No Protocol      Medication(s) to Refill:   Requested Prescriptions     Pending Prescriptions Disp Refills    ERGOCALCIFEROL 1.25 MG (11733 UT) Oral Cap [Pharmacy Med Name: VITAMIN D2 50,000IU (ERGO) CAP RX] 12 capsule 3     Sig: TAKE 1 CAPSULE BY MOUTH 1 TIME A WEEK         Reason for Medication Refill being sent to Provider / Reason Protocol Failed:  [x] Non-Protocol Medication        Recent Labs:  Lab Results   Component Value Date    VITD 9.4 (L) 04/10/2024

## 2025-05-28 RX ORDER — ERGOCALCIFEROL 1.25 MG/1
50000 CAPSULE, LIQUID FILLED ORAL WEEKLY
Qty: 12 CAPSULE | Refills: 0 | Status: SHIPPED | OUTPATIENT
Start: 2025-05-28

## 2025-06-23 ENCOUNTER — TELEPHONE (OUTPATIENT)
Dept: INTERNAL MEDICINE CLINIC | Facility: CLINIC | Age: 45
End: 2025-06-23

## (undated) DEVICE — REM POLYHESIVE ADULT PATIENT RETURN ELECTRODE: Brand: VALLEYLAB

## (undated) DEVICE — DISSECTOR SONICISION CORDLESS

## (undated) DEVICE — CULTURE COLLECT/TRANSPORT SYS

## (undated) DEVICE — STERILE SURGICAL LUBRICANT, METAL TUBE: Brand: SURGILUBE

## (undated) DEVICE — PROXIMATE SKIN STAPLERS (35 WIDE) CONTAINS 35 STAINLESS STEEL STAPLES (FIXED HEAD): Brand: PROXIMATE

## (undated) DEVICE — ENCORE® LATEX MICRO SIZE 7.5, STERILE LATEX POWDER-FREE SURGICAL GLOVE: Brand: ENCORE

## (undated) DEVICE — STERILE LATEX POWDER-FREE SURGICAL GLOVESWITH NITRILE COATING: Brand: PROTEXIS

## (undated) DEVICE — STERILE TETRA-FLEX CF, ELASTIC BANDAGE LATEX FREE 6IN X5.5 YD: Brand: TETRA-FLEX™CF

## (undated) DEVICE — TRAY SRGPRP PVP IOD WT SCRB SM

## (undated) DEVICE — TROCAR: Brand: KII FIOS FIRST ENTRY

## (undated) DEVICE — MEDI-VAC NON-CONDUCTIVE SUCTION TUBING: Brand: CARDINAL HEALTH

## (undated) DEVICE — TROCARS: Brand: KII® BALLOON BLUNT TIP SYSTEM

## (undated) DEVICE — SUTURE SILK 2-0 SH

## (undated) DEVICE — SOL  .9 1000ML BAG

## (undated) DEVICE — Device: Brand: JELCO

## (undated) DEVICE — ECHELON FLEX POWERED PLUS LONG ARTICULATING ENDOSCOPIC LINEAR CUTTER, 60MM: Brand: ECHELON FLEX

## (undated) DEVICE — SUCTION CANISTER, 3000CC,SAFELINER: Brand: DEROYAL

## (undated) DEVICE — SOL  .9 1000ML BTL

## (undated) DEVICE — CULTURE TUBE ANAEROBIC

## (undated) DEVICE — LAP CHOLE: Brand: MEDLINE INDUSTRIES, INC.

## (undated) DEVICE — HOVERMATT 34IN SINGLE USE

## (undated) DEVICE — ENDOPATH ECHELON ENDOSCOPIC LINEAR CUTTER RELOADS, GREEN, 60MM: Brand: ECHELON ENDOPATH

## (undated) DEVICE — SUTURE ETHILON 3-0 669H

## (undated) DEVICE — INTRODUCER EEA25 DISP. TRANS A

## (undated) DEVICE — DERMABOND LIQUID ADHESIVE

## (undated) DEVICE — ENDOSTITCH SURGIDAC 0

## (undated) DEVICE — SUTURE PDS II 1 CT-1

## (undated) DEVICE — FAN SPRAY KIT: Brand: PULSAVAC®

## (undated) DEVICE — HYBRID TUBING/CAP SET FOR OLYMPUS® SCOPES: Brand: ERBE

## (undated) DEVICE — SUPER SPONGES,MEDIUM: Brand: KERLIX

## (undated) DEVICE — SUTURE ENDOSTITCH 2-0 VIO 48\"

## (undated) DEVICE — SOL  .9 3000ML

## (undated) DEVICE — ECHELON FLEX POWERED PLUS ARTICULATING ENDOSCOPIC LINEAR CUTTER , 60MM: Brand: ECHELON FLEX

## (undated) DEVICE — DRAIN INCS 5/8IN 12IN PNRS RBR

## (undated) DEVICE — TISSUE RETRIEVAL SYSTEM: Brand: INZII RETRIEVAL SYSTEM

## (undated) DEVICE — ENDOSCOPY PORT CONNECTOR FOR OLYMPUS® SCOPES: Brand: ERBE

## (undated) DEVICE — LOWER EXTREMITY: Brand: MEDLINE INDUSTRIES, INC.

## (undated) DEVICE — [HIGH FLOW INSUFFLATOR,  DO NOT USE IF PACKAGE IS DAMAGED,  KEEP DRY,  KEEP AWAY FROM SUNLIGHT,  PROTECT FROM HEAT AND RADIOACTIVE SOURCES.]: Brand: PNEUMOSURE

## (undated) DEVICE — POOLE SUCTION INSTRUMENT WITH REMOVABLE SHEATH: Brand: POOLE

## (undated) DEVICE — SEAM GUARD 60 ECHELON

## (undated) DEVICE — UNDYED BRAIDED (POLYGLACTIN 910), SYNTHETIC ABSORBABLE SUTURE: Brand: COATED VICRYL

## (undated) DEVICE — VIOLET BRAIDED (POLYGLACTIN 910), SYNTHETIC ABSORBABLE SUTURE: Brand: COATED VICRYL

## (undated) DEVICE — ENDOPATH ECHELON ENDOSCOPIC LINEAR CUTTER RELOADS, BLUE, 60MM: Brand: ECHELON ENDOPATH

## (undated) DEVICE — SUTURING DEVICE: Brand: ENDO STITCH

## (undated) DEVICE — ENDOPATH ECHELON ENDOSCOPIC LINEAR CUTTER RELOADS, WHITE, 60MM: Brand: ECHELON ENDOPATH

## (undated) DEVICE — DRAPE SHEET LG

## (undated) DEVICE — COVER SGL STRL LGHT HNDL BLU

## (undated) DEVICE — ENCORE® LATEX MICRO SIZE 7, STERILE LATEX POWDER-FREE SURGICAL GLOVE: Brand: ENCORE

## (undated) DEVICE — STAPLER EEA XL 25MM

## (undated) DEVICE — 3M™ STERI-DRAPE™ INSTRUMENT POUCH 1018L: Brand: STERI-DRAPE™

## (undated) NOTE — MR AVS SNAPSHOT
[address] [place] [address].  [place]  [phone]  [phone]               Thank you for choosing us for your health care visit with Lizeth Lo PT.   We are glad to serve you and happy to provide you wi Doxycycline Itching    Sulfa Antibiotics     Vancomycin Itching                   Current Medications          This list is accurate as of: 4/12/17 10:21 AM.  Always use your most recent med list.                ACCU-CHEK FASTCLIX LANCETS Misc   Test bloo Summaries. If you've been to the Emergency Department or your doctor's office, you can view your past visit information in eGames by going to Visits < Visit Summaries. eGames questions? Call [phone] for help.   eGames is NOT to be used for urge

## (undated) NOTE — LETTER
[address] [place] [address]  Authorization for Invasive Procedures  1.  I hereby authorize Dr. Claudio Pichardo , my physician and whomever may be designated as the doctor's assistant, to perform the following operation and/or procedure:  Right performed for the purposes of advancing medicine, science, and/or education, provided my identity is not revealed. If the procedure has been videotaped, the physician/surgeon will obtain the original videotape.  The hospital will not be responsible for stor My signature below affirms that prior to the time of the procedure, I have explained to the patient and/or her legal representative, the risks and benefits involved in the proposed treatment and any reasonable alternative to the proposed treatment.  I have

## (undated) NOTE — MR AVS SNAPSHOT
[address].  [place]  [phone]  [phone]               Thank you for choosing us for your health care visit with Rosmery Chow PT.   We are glad to serve you and happy to provide you wi Sulfa Antibiotics     Vancomycin Itching                   Current Medications          This list is accurate as of: 3/27/17 10:27 AM.  Always use your most recent med list.                ACCU-CHEK FASTCLIX LANCETS Misc   Test blood sugar two times daily Summaries. If you've been to the Emergency Department or your doctor's office, you can view your past visit information in [facility] [employer] by going to Visits < Visit Summaries. [facility] questions? Call [phone] for help.   [facility] is NOT to be used for urge

## (undated) NOTE — MR AVS SNAPSHOT
[address] [place] [address]  [phone]               Thank you for choosing us for your health care visit with Carmen Lynch PT.   We are glad to serve you and happy to provide you w Doxycycline Itching    Sulfa Antibiotics     Vancomycin Itching                   Current Medications          This list is accurate as of: 3/31/17 11:37 AM.  Always use your most recent med list.                ACCU-CHEK FASTCLIX LANCETS Misc   Test bloo Summaries. If you've been to the Emergency Department or your doctor's office, you can view your past visit information in be2 by going to Visits < Visit Summaries. be2 questions? Call [phone] for help.   be2 is NOT to be used for urge

## (undated) NOTE — MR AVS SNAPSHOT
[address] [place] [address]  [phone]               Thank you for choosing us for your health care visit with Narinder Cooper PT.   We are glad to serve you and happy to provide you w Doxycycline Itching    Sulfa Antibiotics     Vancomycin Itching                   Current Medications          This list is accurate as of: 5/1/17  1:04 PM.  Always use your most recent med list.                ACCU-CHEK FASTCLIX LANCETS Misc   Test blood Summaries. If you've been to the Emergency Department or your doctor's office, you can view your past visit information in OKpanda by going to Visits < Visit Summaries. OKpanda questions? Call [phone] for help.   OKpanda is NOT to be used for urge

## (undated) NOTE — MR AVS SNAPSHOT
[address] [facility] [id number] [phone]               Thank you for choosing us for your health care visit with Jessee Main MD.  We are glad to serve you and happy to provide you with this summary of your Take 1 capsule (40 mg total) by mouth daily. Commonly known as:  PROZAC           gabapentin 300 MG Caps   Take 1 capsule (300 mg total) by mouth 3 (three) times daily.    Commonly known as:  NEURONTIN           Glucose Blood Strp   Test blood sugar two t [email or web address]. If you've recently had a stay at the Hospital you can access your discharge instructions in Lookinhotels by going to Visits < Admission Summaries.  If you've been to the Emergency Department or your doctor's office, you can view yo

## (undated) NOTE — LETTER
2018    No Recipients    Re: Moises Do  : 1980      Dear Dr. Martina Peters:    I write to you in regards to my patient, Moises Do, who is scheduled to undergo surgery in the near future.  Based on my assessment on 2018, Car

## (undated) NOTE — MR AVS SNAPSHOT
[address] [facility] [phone]  [phone]               Thank you for choosing us for your health care visit with Keeley Kamara MD.  We are glad to serve you and happy to provide you with this summary of your visit.   Please Today's Orders     Factor V Leiden Mutation    Complete by: Feb 07, 2017 (Approximate)    Assoc Dx:  DVT (deep venous thrombosis) ([facility]) [I82.409]           Lupus Anticoagulant Comp    Complete by:   Feb 07, 2017 (Approximate)    Assoc Dx:  DVT (de Make half your plate fruits and vegetables Highly refined, white starches including white bread, rice and pasta   Eat plenty of protein, keep the fat content low Sugars:  sodas and sports drinks, candies and desserts   Eat plenty of low-fat dairy products

## (undated) NOTE — MR AVS SNAPSHOT
[facility]  [address] [facility] [id number] [phone]               Thank you for choosing us for your health care visit with Virginia Bonilla MD.  We are glad to serve you and happy to provide you with this summary of your place on your body to test. But these other places should not be used in some cases as they may be inaccurate. Follow the instructions for your glucose meter. And talk with your healthcare provider before doing the test on other places.   The strip goes int you eat, what medicines you take, or how much you exercise. To learn more  The resources below can help you learn more:  · American Diabetes Association [phone] www. diabetes. org  · [employer] 039-922-5207 www. lighthouse. org  · National 4 glucose tablets, 4 ounces (half a cup) of fruit juice or regular (nondiet) soda, 8 ounces (1 cup) of fat-free milk, or 1 tablespoon of honey. Don’t take more than this, or your blood sugar may go too high. · Wait 15 minutes.  Then recheck your blood suga your healthcare provider. Date Last Reviewed: 5/1/2016  © 1006-7702 The [address] [facility], [address] [place], [place] [address]. All rights reserved. This information is not intended as a substitute for professional medical care.  Always follow y Call [phone] for help. CartCrunch is NOT to be used for urgent needs. For medical emergencies, dial 911.            Visit [facility] online at  [employer]'s Alternativetn

## (undated) NOTE — MR AVS SNAPSHOT
[address].  [place]  [phone]  [phone]               Thank you for choosing us for your health care visit with Dank Crum PT.   We are glad to serve you and happy to provide you wi Allergies as of Apr 17, 2017     Clindamycin     Doxycycline Itching    Sulfa Antibiotics     Vancomycin Itching                   Current Medications          This list is accurate as of: 4/17/17 11:29 AM.  Always use your most recent med list. discharge instructions in SnowGatehart by going to Visits < Admission Summaries. If you've been to the Emergency Department or your doctor's office, you can view your past visit information in SnowGatehart by going to Visits < Visit Summaries. CanoP questions?

## (undated) NOTE — LETTER
[facility]  Administration of Anesthesia  1.    Brooke Canela, or _________________________________ acting on his/her behalf, (Patient) (Dependent/Representative) request to receive anesthesia for my pending procedure/operation/treatmen pressure, difficulty urinating, slowing of the baby's heart rate and headache. Rare risks include infections, high spinal         block, spinal bleeding, seizure, cardiac arrest and death.   7.   AWARENESS: I understand that it is possible (but unlike ________________________________________________  (Date) (Time)                                                                                                  (Responsible person in case of minor/ unconscious pt) /Relationship    My signature below affir

## (undated) NOTE — MR AVS SNAPSHOT
[address].  [place]  [phone]  [phone]               Thank you for choosing us for your health care visit with Ronit Kenney PT.   We are glad to serve you and happy to provide you wi This list is accurate as of: 4/10/17  8:51 AM.  Always use your most recent med list.                ACCU-CHEK FASTCLIX LANCETS Misc   Test blood sugar two times daily. FLUoxetine HCl 40 MG Caps   Take 1 capsule (40 mg total) by mouth daily.    Co Visits < Visit Summaries. MyChart questions? Call [phone] for help. Body Centralhart is NOT to be used for urgent needs. For medical emergencies, dial 911.            Visit [facility] [employer] online at  [email or web address].tn

## (undated) NOTE — IP AVS SNAPSHOT
[address]  [address] [facility] [phone]                Discharge Summary   2/3/2017    [facility]           Admission Information        Provider Department    2/3/2017 Foster Vazquez MD [facility] Int WEEK 2 & 3: Wear your compression stocking except when sleeping at night. Resume normal activities. Walking is encourages. Elevate leg when sitting. Do not swim or take a bath 48 hours after the procedure.             Take Aleve twice - If you have concerns related to behavioral health issues or thoughts of harming yourself, contact [facility] at [phone].     - If you don’t have insurance, Elo Miner has partnered with Patient [place] Dylan Use: Lower cholesterol, protect your heart   Most common side effects: Dizziness, constipation, abnormal liver function   What to report to your healthcare team:  Dizziness, muscle aches, constipation           Blood Sugar Medications     METFORMIN HCL ER

## (undated) NOTE — MR AVS SNAPSHOT
[address] [place] [address]  [phone]               Thank you for choosing us for your health care visit with Narinder Cooper PT.   We are glad to serve you and happy to provide you w Doxycycline Itching    Sulfa Antibiotics     Vancomycin Itching                   Current Medications          This list is accurate as of: 4/21/17  9:32 AM.  Always use your most recent med list.                ACCU-CHEK FASTCLIX LANCETS Misc   Test bloo Summaries. If you've been to the Emergency Department or your doctor's office, you can view your past visit information in DreamLines by going to Visits < Visit Summaries. DreamLines questions? Call [phone] for help.   DreamLines is NOT to be used for urge

## (undated) NOTE — MR AVS SNAPSHOT
[place]  [address]  [phone]               Thank you for choosing us for your health care visit with Olga Coyne MD.  We are glad to serve you and happy to provide you with this summa authorization, such as [place], please feel free to schedule your appointment immediately. However, if you are unsure about the requirements for authorization, please wait 5-7 days and then contact your physician's office.  At that time, you will Generic drug:  insulin aspart   INJECT 12 UNITS INTO THE SKIN THREE TIMES DAILY WITH MEALS.            Pen Needles 5/16\" 31G X 8 MM Misc   For use with insulin pen, up to 5 injections daily           Pravastatin Sodium 40 MG Tabs   Take 1 tablet (40 mg tot

## (undated) NOTE — LETTER
AUTHORIZATION FOR SURGICAL OPERATION OR OTHER PROCEDURE    1. I hereby authorize Natalee Lei and [address] [place] [address] staff assigned to my case to perform the following operation and/or procedure at the [address] [place] [address]:    _______________________________________________________________________________________________      _______________________________________________________________________________________________    2. My physician has explained the nature and purpose of the operation or other procedure, possible alternative methods of treatment, the risks involved, and the possibility of complication to me. I acknowledge that no guarantee has been made as to the result that may be obtained. 3.  I recognize that, during the course of this operation, or other procedure, unforseen conditions may necessitate additional or different procedure than those listed above. I, therefore, further authorize and request that the above named physician, his/her physician assistants or designees perform such procedures as are, in his/her professional opinion, necessary and desirable. 4.  Any tissue or organs removed in the operation or other procedure may be disposed of by and at the discretion of the [address] [place] [address] and [facility]. 5.  I understand that in the event of a medical emergency, I will be transported by local paramedics to [facility] or other hospital emergency department. 6.  I certify that I have read and fully understand the above consent to operation and/or other procedure. 7.  I acknowledge that my physician has explained sedation/analgesia administration to me including the risks and benefits. I consent to the administration of sedation/analgesia as may be necessary or desirable in the judgement of my physician. Witness signature: ___________________________________________________ Date:  ______/______/_____                    Time:  ________ A. M.  P.M. Patient Name:  ______________________________________________________  (please print)      Patient signature:  ___________________________________________________             Relationship to Patient:           []  Parent    Responsible person                          []  Spouse  In case of minor or                    [] Other  _____________   Incompetent name:  __________________________________________________                               (please print)      _____________      Responsible person  In case of minor or  Incompetent signature:  _______________________________________________    Statement of Physician  My signature below affirms that prior to the time of the procedure, I have explained to the patient and/or his/her guardian, the risks and benefits involved in the proposed treatment and any reasonable alternative to the proposed treatment. I have also explained the risks and benefits involved in the refusal of the proposed treatment and have answered the patient's questions.                         Date:  ______/______/_______  Provider                      Signature:  __________________________________________________________       Time:  ___________ A.M    P.M.

## (undated) NOTE — MR AVS SNAPSHOT
[address] [place] [address]  [phone]  [phone]               Thank you for choosing us for your health care visit with Humberto Mccauley PT.   We are glad to serve you and happy to provide you w Doxycycline Itching    Sulfa Antibiotics     Vancomycin Itching                   Current Medications          This list is accurate as of: 5/23/17 10:48 AM.  Always use your most recent med list.                FLUoxetine HCl 40 MG Caps   Take 1 capsule

## (undated) NOTE — MR AVS SNAPSHOT
[address] [facility]  [phone]               Thank you for choosing us for your health care visit with HUSSAIN Aleman.   We are glad to serve you and happy to provide you wi Current Medications          This list is accurate as of: 5/8/17 11:57 AM.  Always use your most recent med list.                FLUoxetine HCl 40 MG Caps   Take 1 capsule (40 mg total) by mouth daily.    Commonly known as:  PROZAC           ga

## (undated) NOTE — LETTER
Hello,      This is the [facility], office of Dr. Alfred Hearn     Thank you for putting your trust in [facility]. Our goal is to deliver the highest quality healthcare and an exceptional patient experience. Upon reviewing of your medical record shows you are due for the following:     Annual Physical   Mammogram  Diabetic eye exam  Diabetic foot exam  Diabetic A1C check     Please call [phone] to schedule your appointment or schedule online via [employer].     If you changed to a new provider at another facility, please notify the clinic to update your records.     If you had any recent testing at another facility, please have your results faxed to our office at [phone].      Thank you and have a great day!  06/23/25

## (undated) NOTE — LETTER
Hospital Discharge Documentation  Please phone to schedule a hospital follow up appointment.     From: [address] Hospitalist's Office  Phone: [phone]    Patient discharged time/date: 4/16/2019  1:15 PM  Patient discharge disposition:  Home or Self Vitamin D deficiency     Preop testing[FG.2]        Physical Exam:   General appearance: alert, appears stated age and cooperative  Pulmonary:  clear to auscultation bilaterally  Cardiovascular: S1, S2 normal, no murmur, click, rub or gallop, regular ra Commonly known as:  PROZAC      TAKE 1 CAPSULE BY MOUTH DAILY   Quantity:  30 capsule  Refills:  1     ONETOUCH DELICA LANCETS FINE Misc      Check 2 times daily   Quantity:  200 each  Refills:  1     ONETOUCH VERIO Strp      Check 2 times daily   Quantity

## (undated) NOTE — Clinical Note
Any way we can get her to see the surgeons? Vasquez did not schedule her for one of the months in between.  That's why she missed a month

## (undated) NOTE — MR AVS SNAPSHOT
[facility]  [address] [place] [address] [phone]               Thank you for choosing us for your health care visit with Tyrell Espinal MD.  We are glad to serve you and happy to provide you with this summary of your visit.   Ple Follow-up Instructions     Return in about 3 months (around 8/8/2017).          Reason for Today's Visit     Diabetes           Medical Issues Discussed Today     Uncontrolled type 2 diabetes mellitus with hyperglycemia, with long-term current use of insuli HEMOGLOBIN A1C 6.3 4.3 - 5.6 %    Cartridge Lot# [id number] Numeric    Cartridge Expiration Date 2/28/19 Date                  MyChart     Visit MyChart  You can access your MyChart to more actively manage your health care and view more details from this visi

## (undated) NOTE — MR AVS SNAPSHOT
[address]  [phone]               Thank you for choosing us for your health care visit with [address] [place] [address].   We are glad to serve you and happy to provide you with [facility] Pravastatin Sodium 40 MG Tabs   Take 1 tablet (40 mg total) by mouth nightly. Commonly known as:  PRAVACHOL           RELION ULTIMA GLUCOSE SYSTEM w/Device Kit   by Does not apply route.  Test blood sugar TID                   MyChart     Visit MyChart

## (undated) NOTE — LETTER
[facility], [place]  Authorization for Invasive Procedures  1.  I hereby authorize Dr. Cosmo Lennox** , my physician and whomever may be designated as the doctor's assistant, to perform the following operation and/or procedure:  *La 5. I consent to the photographing of the operations or procedures to be performed for the purposes of advancing medicine, science, and/or education, provided my identity is not revealed.  If the procedure has been videotaped, the physician/surgeon will obta __________ Time: ___________    Statement of Physician  My signature below affirms that prior to the time of the procedure, I have explained to the patient and/or her legal representative, the risks and benefits involved in the proposed treatment and any r

## (undated) NOTE — MR AVS SNAPSHOT
[address] [facility]  [phone]               Thank you for choosing us for your health care visit with Leisa Andersen PT.   We are glad to serve you and happy to provide you w ACCU-CHEK FASTCLIX LANCETS Misc   Test blood sugar two times daily. FLUoxetine HCl 40 MG Caps   Take 1 capsule (40 mg total) by mouth daily.    Commonly known as:  PROZAC           gabapentin 300 MG Caps   Take 1 capsule (300 mg total Call [phone] for help. Fitlyt is NOT to be used for urgent needs. For medical emergencies, dial 911.            Visit [facility] online at  [email or web address]

## (undated) NOTE — ED AVS SNAPSHOT
Jaymie Jovan   MRN: [id number]    Department:  [facility] Emergency Department   Date of Visit:  5/20/2018           Disclosure     Insurance plans vary and the physician(s) referred by the ER may not be covered by your plan.  Please contact CARE PHYSICIAN AT ONCE OR RETURN IMMEDIATELY TO THE EMERGENCY DEPARTMENT. If you have been prescribed any medication(s), please fill your prescription right away and begin taking the medication(s) as directed.   If you believe that any of the medications

## (undated) NOTE — MR AVS SNAPSHOT
[address] [facility]  [address] [place] [address] [phone]               Thank you for choosing us for your health care visit with Jinny Nelson MD.  We are glad to serve you and happy to provide you with this summary of your visit.   Ple Take 1 capsule (300 mg total) by mouth 3 (three) times daily.    Commonly known as:  NEURONTIN           Glucose Blood Strp   Test blood sugar two times daily   Commonly known as:  ACCU-CHEK SMARTVIEW           * insulin aspart 100 UNIT/ML Sopn   Inject 1-7 If you've recently had a stay at the Hospital you can access your discharge instructions in [employer] by going to Visits < Admission Summaries.  If you've been to the Emergency Department or your doctor's office, you can view your past visit information in My

## (undated) NOTE — MR AVS SNAPSHOT
[facility] [place] [address] [phone]               Thank you for choosing us for your health care visit with Andrena Apgar, MD.  We are glad to serve you and happy to provide you with this summary of your vi Accurate as of 11/7/18 10:33 AM. Always use your most recent med list.               Cyanocobalamin 500 MCG/0.1ML Soln  1 spray by Nasal route once a week.   Commonly known as:  NASCOBAL        ergocalciferol 03933 units Caps  Take 1 capsule (50,000 Units Visit [facility] online at  [email or web address]

## (undated) NOTE — MR AVS SNAPSHOT
[address] [place] [address]  [phone]               Thank you for choosing us for your health care visit with Narinder Cooper PT.   We are glad to serve you and happy to provide you w Vancomycin Itching                   Current Medications          This list is accurate as of: 4/14/17  9:49 AM.  Always use your most recent med list.                ACCU-CHEK FASTCLIX LANCETS Misc   Test blood sugar two times daily.            FLUoxetine office, you can view your past visit information in ezeep by going to Visits < Visit Summaries. ezeep questions? Call [phone] for help. ezeep is NOT to be used for urgent needs. For medical emergencies, dial 911.            Visit [facility]

## (undated) NOTE — MR AVS SNAPSHOT
[address] [place] [address]  [phone]               Thank you for choosing us for your health care visit with Lv Maier PT.   We are glad to serve you and happy to provide you w Instructions and Information about Your Health     None      Allergies as of Apr 19, 2017     Clindamycin     Doxycycline Itching    Sulfa Antibiotics     Vancomycin Itching                   Current Medications          This list is accurate as of: 4/19/1 Hours:  24-hours Phone:  [phone]    - gabapentin 300 MG Caps            MyChart     Visit MyChart  You can access your MyChart to more actively manage your health care and view more details from this visit by going to [email or web address].  I

## (undated) NOTE — LETTER
[facility]  Administration of Anesthesia  1.    Gayland Common, or _________________________________ acting on his/her behalf, (Patient) (Dependent/Representative) request to receive anesthesia for my pending procedure/operation/treatmen pressure, difficulty urinating, slowing of the baby's heart rate and headache. Rare risks include infections, high spinal         block, spinal bleeding, seizure, cardiac arrest and death.   7.   AWARENESS: I understand that it is possible (but unlike ________________________________________________  (Date) (Time)                                                                                                  (Responsible person in case of minor/ unconscious pt) /Relationship    My signature below affir

## (undated) NOTE — IP AVS SNAPSHOT
[address]  [address] [facility] [address] ~ [phone]                Discharge Summary   2/10/2017    Dayanara Ortiz           Admission Information        Provider Department    2/10/2017 Romero Isaac MD Inject 12 Units into the skin 3 (three) times daily with meals.     Negar Jamil        Before breakfast       Before lunch       Before dinner           insulin detemir 100 UNIT/ML Sopn   Last time this was given:  42 Units on 2/22/2017 11:52 AM   Com MARY LEWIS.  [facility], [phone], [address] [place], [place] [address], [place] [address]    Hours:  24-hours Phone:  [phone]    - FLUoxetine HCl 40 MG Caps  - insulin aspart 100 UNIT/ML Sopn  - insulin aspart 100 UNIT/ML Sopn  - insulin det * Notice: This list has 2 medication(s) that are the same as other medications prescribed for you. Read the directions carefully, and ask your doctor or other care provider to review them with you.       CHANGE how you take these medications          Clin Give 5 units for blood glucose 321-360 mg/dL  Call physician if blood glucose is greater than 360 mg/dL      Keep Wound Vac always clean, dry and intact. Make sure that it is always running.   Monitor foam dressing that it should always looks tight and \"p Contact information:    [address] [address] [phone]          Follow up with Emeterio Park DO In 3 weeks.     Specialty:  INFECTIOUS DISEASES    Contact information:    [address]  [address] ALT Bilirubin,Total Total Protein Albumin Sodium Potassium Chloride    (02/10/17)  28 (02/10/17)  0.5 (02/10/17)  6.9 (02/10/17)  2.7 (L) (02/22/17)  136 (02/22/17)  4.4 (02/22/17)  103      Pending Labs     Order Current Status    BLOOD CULTURE Prelimina For medical emergencies, dial 911.             _____________________________________________________________________________    Medication Side Effects - Medications to be taken at home  As your caregivers, we want you to be aware of the medications you a Most common side effects: Constipation, drowsiness, dizziness, urinary retention (inability to urinate)   What to report to your healthcare team: Difficulty urinating, dizziness, no bowel movement in 2+ days, unresolved pain           General Nerve Functio

## (undated) NOTE — LETTER
AUTHORIZATION FOR SURGICAL OPERATION OR OTHER PROCEDURE    1. I hereby authorize Ally NIXON, and [address] [place] [address] staff assigned to my case to perform the following operation and/or procedure at the [address] [place] [address]:    [address] [place] [address]    2. My physician has explained the nature and purpose of the operation or other procedure, possible alternative methods of treatment, the risks involved, and the possibility of complication to me. I acknowledge that no guarantee has been made as to the result that may be obtained. 3.  I recognize that, during the course of this operation, or other procedure, unforseen conditions may necessitate additional or different procedure than those listed above. I, therefore, further authorize and request that the above named physician, his/her physician assistants or designees perform such procedures as are, in his/her professional opinion, necessary and desirable. 4.  Any tissue or organs removed in the operation or other procedure may be disposed of by and at the discretion of the [address] [place] [address] and [facility]. 5.  I understand that in the event of a medical emergency, I will be transported by local paramedics to [facility] or other hospital emergency department. 6.  I certify that I have read and fully understand the above consent to operation and/or other procedure. 7.  I acknowledge that my physician has explained sedation/analgesia administration to me including the risks and benefits. I consent to the administration of sedation/analgesia as may be necessary or desirable in the judgement of my physician. Witness signature: ___________________________________________________ Date:  ______/______/_____                    Time:  ________ A. M.  P.M.        Patient Name:  ______________________________________________________  (please print)      Patient signature: ___________________________________________________             Relationship to Patient:           []  Parent    Responsible person                          []  Spouse  In case of minor or                    [] Other  _____________   Incompetent name:  __________________________________________________                               (please print)      _____________      Responsible person  In case of minor or  Incompetent signature:  _______________________________________________    Statement of Physician  My signature below affirms that prior to the time of the procedure, I have explained to the patient and/or his/her guardian, the risks and benefits involved in the proposed treatment and any reasonable alternative to the proposed treatment. I have also explained the risks and benefits involved in the refusal of the proposed treatment and have answered the patient's questions.                         Date:  ______/______/_______  Provider                      Signature:  __________________________________________________________       Time:  ___________ A.M    P.M.

## (undated) NOTE — LETTER
VACCINE ADMINISTRATION RECORD  PARENT / GUARDIAN APPROVAL  Date: 2022  Vaccine administered to: Kendra Monahan     : 1980    MRN: [id number]    A copy of the appropriate Centers for Disease Control and Prevention Vaccine Information statement has been provided. I have read or have had explained the information about the diseases and the vaccines listed below. There was an opportunity to ask questions and any questions were answered satisfactorily. I believe that I understand the benefits and risks of the vaccine cited and ask that the vaccine(s) listed below be given to me or to the person named above (for whom I am authorized to make this request). VACCINES ADMINISTERED:  Gardasil    I have read and hereby agree to be bound by the terms of this agreement as stated above. My signature is valid until revoked by me in writing. This document is signed by SELF, relationship: SELF on 2022.:                                                                                                                                         Parent / Guardian Signature                                                Date    Kaye Raphael served as a witness to authentication that the identity of the person signing electronically is in fact the person represented as signing.

## (undated) NOTE — LETTER
AUTHORIZATION FOR SURGICAL OPERATION OR OTHER PROCEDURE    1.  I hereby [facility] Calhoun, [facility] assigned to my case to perform the following operation and/or procedure at the [facility]:    IUD INSERTION ________________ Time:  ________ A. M.  P.M.        Patient Name:  ______________________________________________________  (please print)      Patient signature:  ___________________________________________________             Relationship to Patient:

## (undated) NOTE — LETTER
[address], [place]  Authorization for Invasive Procedures  1.  I hereby authorize Dr. Ting Perkins , my physician and whomever may be designated as the doctor's assistant, to perform the following operation and/or procedure:  *Rig performed for the purposes of advancing medicine, science, and/or education, provided my identity is not revealed. If the procedure has been videotaped, the physician/surgeon will obtain the original videotape.  The hospital will not be responsible for stor My signature below affirms that prior to the time of the procedure, I have explained to the patient and/or her legal representative, the risks and benefits involved in the proposed treatment and any reasonable alternative to the proposed treatment.  I have

## (undated) NOTE — Clinical Note
June 8, 2017    Abundio Cole MD  [address]     Patient: Анна Buchanan   YOB: 1980   Date of Visit: 6/8/2017       Dear Dr. Tl Ortiz MD:    Thank you for referring Ana Harris to me for evaluation. UNITS INTO THE SKIN THREE TIMES DAILY WITH MEALS. Disp: 15 mL Rfl: 2   Blood Glucose Monitoring Suppl ([address]) w/Device Does not apply Kit by Does not apply route.  Test blood sugar TID Disp:  Rfl:    FLUoxetine HCl 40 MG Oral Cap Take Lids/Lashes Meibomian gland dysfunction Meibomian gland dysfunction    Conjunctiva/Sclera Normal Normal    Cornea Clear Clear    Anterior Chamber Deep and quiet Deep and quiet    Iris Normal Normal    Lens Clear Clear    Vitreous Clear Clear      Fundus E Document electronically generated by: Le Moise

## (undated) NOTE — MR AVS SNAPSHOT
[address] [place] [address].  [place]  [phone]  [phone]               Thank you for choosing us for your health care visit with Sidra Neff PT.   We are glad to serve you and happy to provide you wi Allergies as of Apr 26, 2017     Clindamycin     Doxycycline Itching    Sulfa Antibiotics     Vancomycin Itching                   Current Medications          This list is accurate as of: 4/26/17  4:32 PM.  Always use your most recent med list. discharge instructions in [employer] by going to Visits < Admission Summaries. If you've been to the Emergency Department or your doctor's office, you can view your past visit information in [employer] by going to Visits < Visit Summaries. [employer]BESOFT questions?

## (undated) NOTE — LETTER
[address], [place], [place]     AUTHORIZATION FOR SURGICAL OPERATION OR PROCEDURE    1.    I hereby authorize Dr. Rose Marie Dumont MD, my Physician(s) and whomever may be designated as the doctor's Assistant, to perform to have an autologous transfusion of my own blood, or a directed donor transfusion, I will discuss this with my         Physician.   5.   I consent to the photographing of procedure(s) to be performed for the purposes of advancing medicine, science (Responsible person in case of minor or unconscious patient)   (Relationship to Patient)      _______________________________________________________________ ____________________________  (Witness signature)

## (undated) NOTE — LETTER
VACCINE ADMINISTRATION RECORD  PARENT / GUARDIAN APPROVAL  Date: 2022  Vaccine administered to: Cherelle Olson     : 1980    MRN: [id number]    A copy of the appropriate Centers for Disease Control and Prevention Vaccine Information statement has been provided. I have read or have had explained the information about the diseases and the vaccines listed below. There was an opportunity to ask questions and any questions were answered satisfactorily. I believe that I understand the benefits and risks of the vaccine cited and ask that the vaccine(s) listed below be given to me or to the person named above (for whom I am authorized to make this request). VACCINES ADMINISTERED:  Gardasil    I have read and hereby agree to be bound by the terms of this agreement as stated above. My signature is valid until revoked by me in writing. This document is signed by SELF, relationship: Self on 2022.:                                                                                                                                         Parent / Guardian Signature                                                Date    Ravinder Ellis served as a witness to authentication that the identity of the person signing electronically is in fact the person represented as signing. This document was generated by Ravinder Ellis on 2022.

## (undated) NOTE — LETTER
[facility]  Administration of Anesthesia  1.    Annie Maier, or _________________________________ acting on his/her behalf, (Patient) (Dependent/Representative) request to receive anesthesia for my pending procedure/operation/treatmen pressure, difficulty urinating, slowing of the baby's heart rate and headache. Rare risks include infections, high spinal         block, spinal bleeding, seizure, cardiac arrest and death.   7.   AWARENESS: I understand that it is possible (but unlike ________________________________________________  (Date) (Time)                                                                                                  (Responsible person in case of minor/ unconscious pt) /Relationship    My signature below affir

## (undated) NOTE — MR AVS SNAPSHOT
[address] [place] [address].  [address]  [phone]               Thank you for choosing us for your health care visit with Ronit Kenney PT.   We are glad to serve you and happy to provide you wi Sulfa Antibiotics     Vancomycin Itching                   Current Medications          This list is accurate as of: 3/29/17 10:05 AM.  Always use your most recent med list.                ACCU-CHEK FASTCLIX LANCETS Misc   Test blood sugar two times daily Summaries. If you've been to the Emergency Department or your doctor's office, you can view your past visit information in [employer] by going to Visits < Visit Summaries. [employer] questions? Call [phone] for help.   [employer] is NOT to be used for urge

## (undated) NOTE — MR AVS SNAPSHOT
[address].  [place]  [phone]  [phone]               Thank you for choosing us for your health care visit with Mario Benitez PT.   We are glad to serve you and happy to provide you wi Current Medications          This list is accurate as of: 4/5/17 12:09 PM.  Always use your most recent med list.                ACCU-CHEK FASTCLIX LANCETS Misc   Test blood sugar two times daily.            FLUoxetine HCl 40 MG Caps   Take 1 capsu office, you can view your past visit information in [employer] by going to Visits < Visit Summaries. [employer] questions? Call [phone] for help. [employer] is NOT to be used for urgent needs. For medical emergencies, dial 911.            Visit [facility]

## (undated) NOTE — LETTER
[address], [place], [place]     AUTHORIZATION FOR SURGICAL OPERATION OR PROCEDURE    1.    I hereby authorize  Dr. Harry Soulier           , my Physician(s) and whomever may be designated as the doctor's Assistant, to to have an autologous transfusion of my own blood, or a directed donor transfusion, I will discuss this with my         Physician.   5.   I consent to the photographing of procedure(s) to be performed for the purposes of advancing medicine, science (Responsible person in case of minor or unconscious patient)   (Relationship to Patient)      _______________________________________________________________ ____________________________  (Witness signature)

## (undated) NOTE — LETTER
[facility]  [address] [place] [address]  [address]  Phone: [phone]  Fax: [phone]       Hello,     This is the [facility], office of Dr. Alfred Hearn    Thank you for putting your trust in [facility].  Our goal is to deliver the highest quality healthcare and an exceptional patient experience. Upon reviewing of your medical record shows you are due for the following:     Annual Physical   Mammogram   Diabetic Eye Exam     Please call [phone] to schedule your appointment or schedule online via [employer].     If you changed to a new provider at another facility, please notify the clinic to update your records.    If you had any recent testing at another facility, please have your results faxed to our office at [phone].     Thank you and have a great day! 06/06/24

## (undated) NOTE — MR AVS SNAPSHOT
[address].  [place]  [phone]  [phone]               Thank you for choosing us for your health care visit with Ministerio Aranda PT.   We are glad to serve you and happy to provide you wi Allergies as of Apr 03, 2017     Clindamycin     Doxycycline Itching    Sulfa Antibiotics     Vancomycin Itching                   Current Medications          This list is accurate as of: 4/3/17  9:38 AM.  Always use your most recent med list. discharge instructions in [employer] by going to Visits < Admission Summaries. If you've been to the Emergency Department or your doctor's office, you can view your past visit information in [employer] by going to Visits < Visit Summaries. [employer] questions?

## (undated) NOTE — MR AVS SNAPSHOT
[address] [place] [address]  [phone]               Thank you for choosing us for your health care visit with Narinder Cooper PT.   We are glad to serve you and happy to provide you w Inject 42 Units into the skin daily. Commonly known as:  LEVEMIR           NOVOLOG FLEXPEN 100 UNIT/ML Sopn   Generic drug:  insulin aspart   INJECT 12 UNITS INTO THE SKIN THREE TIMES DAILY WITH MEALS.            Pen Needles 5/16\" 31G X 8 MM Misc   For u

## (undated) NOTE — MR AVS SNAPSHOT
[address] [facility] [address] [facility] [address] [facility] [facility]  [phone]               Thank you for choosing us for your health care visit with Leisa Andersen PT.   We are glad to serve you and happy to provide you w Allergies as of Apr 07, 2017     Clindamycin     Doxycycline Itching    Sulfa Antibiotics     Vancomycin Itching                   Current Medications          This list is accurate as of: 4/7/17  8:59 AM.  Always use your most recent med list. discharge instructions in [employer] by going to Visits < Admission Summaries. If you've been to the Emergency Department or your doctor's office, you can view your past visit information in [employer] by going to Visits < Visit Summaries. [employer] questions?

## (undated) NOTE — MR AVS SNAPSHOT
After Visit Summary   2/7/2017    Shelly Jeong    MRN: [id number]           Visit Information        Provider Department Dept Phone    2/7/2017 11:00 AM Just In Time Clinic [facility] [phone]      Reason for Visit     Follow - dextrose injection 50 mL 50 mL As needed 2/13/2017 1600 (none)    HYDROcodone-acetaminophen (NORCO) 5-325 MG per tab 1 tablet 1 tablet 6 times daily 2/12/2017 1600 (none)    dextrose injection 50 mL 50 mL As needed 2/11/2017 0227 (none)    0.9%  NaCl infu LUPUS ANTICOAGULANT COMP [2473481 CUSTOM]  2/7/2017 (Approximate) 2/7/2018    PROTEIN C ANTIGEN [5570714 CUSTOM]  2/7/2017 (Approximate) 2/7/2018    PROTEIN S-ANTIGEN [[id number] CUSTOM]  2/7/2017 (Approximate) 2/7/2018      MyChart     Visit [employer]  You ca

## (undated) NOTE — MR AVS SNAPSHOT
[address].  [place]  [phone]  [phone]               Thank you for choosing us for your health care visit with Ministerio Aranda PT.   We are glad to serve you and happy to provide you wi Current Medications          This list is accurate as of: 5/11/17  9:06 AM.  Always use your most recent med list.                FLUoxetine HCl 40 MG Caps   Take 1 capsule (40 mg total) by mouth daily.    Commonly known as:  PROZAC           g

## (undated) NOTE — LETTER
VACCINE ADMINISTRATION RECORD  PARENT / GUARDIAN APPROVAL  Date: 8/15/2022  Vaccine administered to: Jose Enrique Arias     : 1980    MRN: [id number]    A copy of the appropriate Centers for Disease Control and Prevention Vaccine Information statement has been provided. I have read or have had explained the information about the diseases and the vaccines listed below. There was an opportunity to ask questions and any questions were answered satisfactorily. I believe that I understand the benefits and risks of the vaccine cited and ask that the vaccine(s) listed below be given to me or to the person named above (for whom I am authorized to make this request). VACCINES ADMINISTERED:  Gardasil    I have read and hereby agree to be bound by the terms of this agreement as stated above. My signature is valid until revoked by me in writing. This document is signed by Jose Enrique Arias , relationship: Self on 8/15/2022.:                                                                                                                                         Parent / Larry Red                                                Date    [place]an [place] RN served as a witness to authentication that the identity of the person signing electronically is in fact the person represented as signing.

## (undated) NOTE — MR AVS SNAPSHOT
[address] [place] [address].  [place]  [phone]  [phone]               Thank you for choosing us for your health care visit with Gabby Rankin PT.   We are glad to serve you and happy to provide you wi recent med list.                FLUoxetine HCl 40 MG Caps   Take 1 capsule (40 mg total) by mouth daily. Commonly known as:  PROZAC           gabapentin 300 MG Caps   Take 1 capsule (300 mg total) by mouth 3 (three) times daily.    Commonly known as:  JORGE

## (undated) NOTE — MR AVS SNAPSHOT
After Visit Summary   12/20/2019    Jay Cobb    MRN: [id number]           Visit Information     Date & Time  12/20/2019  8:30 AM Provider  Mindy Menjivar, [address] [place] [address] 96, [facility]  Phone  [phone] HIV AG AB COMBO [[id number] CUSTOM]  12/20/2019 (Approximate) 12/20/2020    HPV HIGH RISK , THIN PREP COLLECTION [[id number] CUSTOM]  12/20/2019 12/20/2020    T PALLIDUM SCREENING CASCADE [8752779 CPT(R)]  12/20/2019 (Approximate) 12/20/2020    THINPREP PAP SME [address] [facility],[address] [place] [address]  Monday - Friday  10:00 am - 10:00 pm  Saturday - Sunday  10:00 am - 4:00 pm      [address]  Monday - Friday  4:00 pm - 10:00 pm  Saturday - Sunday  10:00 am - 4:00 pm       Conditions needing urgent attention, but are not life-threatening